# Patient Record
Sex: MALE | Race: WHITE | NOT HISPANIC OR LATINO | Employment: FULL TIME | URBAN - METROPOLITAN AREA
[De-identification: names, ages, dates, MRNs, and addresses within clinical notes are randomized per-mention and may not be internally consistent; named-entity substitution may affect disease eponyms.]

---

## 2021-10-19 ENCOUNTER — APPOINTMENT (OUTPATIENT)
Dept: CT IMAGING | Facility: CLINIC | Age: 36
DRG: 439 | End: 2021-10-19
Attending: EMERGENCY MEDICINE
Payer: COMMERCIAL

## 2021-10-19 ENCOUNTER — HOSPITAL ENCOUNTER (INPATIENT)
Facility: CLINIC | Age: 36
LOS: 2 days | Discharge: HOME | DRG: 439 | End: 2021-10-21
Attending: EMERGENCY MEDICINE | Admitting: INTERNAL MEDICINE
Payer: COMMERCIAL

## 2021-10-19 DIAGNOSIS — K85.21 ALCOHOL-INDUCED ACUTE PANCREATITIS WITH UNINFECTED NECROSIS: ICD-10-CM

## 2021-10-19 DIAGNOSIS — F10.930 ALCOHOL WITHDRAWAL, UNCOMPLICATED (H): ICD-10-CM

## 2021-10-19 DIAGNOSIS — R91.8 PULMONARY NODULES: ICD-10-CM

## 2021-10-19 LAB
ALBUMIN SERPL-MCNC: 4.6 G/DL (ref 3.4–5)
ALP SERPL-CCNC: 75 U/L (ref 40–150)
ALT SERPL W P-5'-P-CCNC: 77 U/L (ref 0–70)
ANION GAP SERPL CALCULATED.3IONS-SCNC: 11 MMOL/L (ref 3–14)
AST SERPL W P-5'-P-CCNC: 79 U/L (ref 0–45)
BASOPHILS # BLD AUTO: 0 10E3/UL (ref 0–0.2)
BASOPHILS NFR BLD AUTO: 0 %
BILIRUB SERPL-MCNC: 0.8 MG/DL (ref 0.2–1.3)
BUN SERPL-MCNC: 12 MG/DL (ref 7–30)
CALCIUM SERPL-MCNC: 10 MG/DL (ref 8.5–10.1)
CHLORIDE BLD-SCNC: 96 MMOL/L (ref 94–109)
CO2 SERPL-SCNC: 25 MMOL/L (ref 20–32)
CREAT SERPL-MCNC: 0.84 MG/DL (ref 0.66–1.25)
CREAT SERPL-MCNC: 0.89 MG/DL (ref 0.66–1.25)
EOSINOPHIL # BLD AUTO: 0.1 10E3/UL (ref 0–0.7)
EOSINOPHIL NFR BLD AUTO: 1 %
ERYTHROCYTE [DISTWIDTH] IN BLOOD BY AUTOMATED COUNT: 11.9 % (ref 10–15)
ETHANOL SERPL-MCNC: <0.01 G/DL
GFR SERPL CREATININE-BSD FRML MDRD: >90 ML/MIN/1.73M2
GFR SERPL CREATININE-BSD FRML MDRD: >90 ML/MIN/1.73M2
GLUCOSE BLD-MCNC: 109 MG/DL (ref 70–99)
HCT VFR BLD AUTO: 48.7 % (ref 40–53)
HGB BLD-MCNC: 17.3 G/DL (ref 13.3–17.7)
HOLD SPECIMEN: NORMAL
HOLD SPECIMEN: NORMAL
IMM GRANULOCYTES # BLD: 0 10E3/UL
IMM GRANULOCYTES NFR BLD: 0 %
LIPASE SERPL-CCNC: ABNORMAL U/L (ref 73–393)
LYMPHOCYTES # BLD AUTO: 1 10E3/UL (ref 0.8–5.3)
LYMPHOCYTES NFR BLD AUTO: 9 %
MAGNESIUM SERPL-MCNC: 1.8 MG/DL (ref 1.6–2.3)
MCH RBC QN AUTO: 35.9 PG (ref 26.5–33)
MCHC RBC AUTO-ENTMCNC: 35.5 G/DL (ref 31.5–36.5)
MCV RBC AUTO: 101 FL (ref 78–100)
MONOCYTES # BLD AUTO: 0.9 10E3/UL (ref 0–1.3)
MONOCYTES NFR BLD AUTO: 8 %
NEUTROPHILS # BLD AUTO: 9.1 10E3/UL (ref 1.6–8.3)
NEUTROPHILS NFR BLD AUTO: 82 %
NRBC # BLD AUTO: 0 10E3/UL
NRBC BLD AUTO-RTO: 0 /100
PHOSPHATE SERPL-MCNC: 2.6 MG/DL (ref 2.5–4.5)
PLATELET # BLD AUTO: 279 10E3/UL (ref 150–450)
POTASSIUM BLD-SCNC: 3.8 MMOL/L (ref 3.4–5.3)
PROT SERPL-MCNC: 9.2 G/DL (ref 6.8–8.8)
RBC # BLD AUTO: 4.82 10E6/UL (ref 4.4–5.9)
SARS-COV-2 RNA RESP QL NAA+PROBE: NEGATIVE
SODIUM SERPL-SCNC: 132 MMOL/L (ref 133–144)
TROPONIN I SERPL-MCNC: <0.015 UG/L (ref 0–0.04)
WBC # BLD AUTO: 11.1 10E3/UL (ref 4–11)

## 2021-10-19 PROCEDURE — 83735 ASSAY OF MAGNESIUM: CPT | Performed by: EMERGENCY MEDICINE

## 2021-10-19 PROCEDURE — 99285 EMERGENCY DEPT VISIT HI MDM: CPT | Mod: 25

## 2021-10-19 PROCEDURE — 96361 HYDRATE IV INFUSION ADD-ON: CPT

## 2021-10-19 PROCEDURE — 250N000013 HC RX MED GY IP 250 OP 250 PS 637: Performed by: EMERGENCY MEDICINE

## 2021-10-19 PROCEDURE — 250N000011 HC RX IP 250 OP 636: Performed by: EMERGENCY MEDICINE

## 2021-10-19 PROCEDURE — 80053 COMPREHEN METABOLIC PANEL: CPT | Performed by: EMERGENCY MEDICINE

## 2021-10-19 PROCEDURE — 85025 COMPLETE CBC W/AUTO DIFF WBC: CPT | Performed by: EMERGENCY MEDICINE

## 2021-10-19 PROCEDURE — 96374 THER/PROPH/DIAG INJ IV PUSH: CPT | Mod: 59

## 2021-10-19 PROCEDURE — C9803 HOPD COVID-19 SPEC COLLECT: HCPCS

## 2021-10-19 PROCEDURE — 74177 CT ABD & PELVIS W/CONTRAST: CPT

## 2021-10-19 PROCEDURE — 258N000003 HC RX IP 258 OP 636: Performed by: INTERNAL MEDICINE

## 2021-10-19 PROCEDURE — 258N000003 HC RX IP 258 OP 636: Performed by: EMERGENCY MEDICINE

## 2021-10-19 PROCEDURE — 83690 ASSAY OF LIPASE: CPT | Performed by: EMERGENCY MEDICINE

## 2021-10-19 PROCEDURE — 84484 ASSAY OF TROPONIN QUANT: CPT | Performed by: EMERGENCY MEDICINE

## 2021-10-19 PROCEDURE — 96375 TX/PRO/DX INJ NEW DRUG ADDON: CPT

## 2021-10-19 PROCEDURE — 82077 ASSAY SPEC XCP UR&BREATH IA: CPT | Performed by: EMERGENCY MEDICINE

## 2021-10-19 PROCEDURE — 250N000011 HC RX IP 250 OP 636: Performed by: INTERNAL MEDICINE

## 2021-10-19 PROCEDURE — 82565 ASSAY OF CREATININE: CPT | Performed by: INTERNAL MEDICINE

## 2021-10-19 PROCEDURE — 250N000009 HC RX 250: Performed by: INTERNAL MEDICINE

## 2021-10-19 PROCEDURE — 99223 1ST HOSP IP/OBS HIGH 75: CPT | Mod: AI | Performed by: INTERNAL MEDICINE

## 2021-10-19 PROCEDURE — 250N000013 HC RX MED GY IP 250 OP 250 PS 637: Performed by: INTERNAL MEDICINE

## 2021-10-19 PROCEDURE — 93005 ELECTROCARDIOGRAM TRACING: CPT

## 2021-10-19 PROCEDURE — 120N000001 HC R&B MED SURG/OB

## 2021-10-19 PROCEDURE — 250N000011 HC RX IP 250 OP 636

## 2021-10-19 PROCEDURE — 87635 SARS-COV-2 COVID-19 AMP PRB: CPT | Performed by: EMERGENCY MEDICINE

## 2021-10-19 PROCEDURE — 250N000009 HC RX 250: Performed by: EMERGENCY MEDICINE

## 2021-10-19 PROCEDURE — 36415 COLL VENOUS BLD VENIPUNCTURE: CPT | Performed by: EMERGENCY MEDICINE

## 2021-10-19 PROCEDURE — 84100 ASSAY OF PHOSPHORUS: CPT | Performed by: EMERGENCY MEDICINE

## 2021-10-19 RX ORDER — GABAPENTIN 300 MG/1
600 CAPSULE ORAL EVERY 8 HOURS
Status: DISCONTINUED | OUTPATIENT
Start: 2021-10-22 | End: 2021-10-19

## 2021-10-19 RX ORDER — CLONIDINE HYDROCHLORIDE 0.1 MG/1
0.1 TABLET ORAL EVERY 8 HOURS
Status: DISCONTINUED | OUTPATIENT
Start: 2021-10-19 | End: 2021-10-19

## 2021-10-19 RX ORDER — GABAPENTIN 300 MG/1
300 CAPSULE ORAL EVERY 8 HOURS
Status: DISCONTINUED | OUTPATIENT
Start: 2021-10-24 | End: 2021-10-21 | Stop reason: HOSPADM

## 2021-10-19 RX ORDER — OLANZAPINE 5 MG/1
5-10 TABLET, ORALLY DISINTEGRATING ORAL EVERY 6 HOURS PRN
Status: DISCONTINUED | OUTPATIENT
Start: 2021-10-19 | End: 2021-10-19

## 2021-10-19 RX ORDER — GABAPENTIN 300 MG/1
900 CAPSULE ORAL EVERY 8 HOURS
Status: DISCONTINUED | OUTPATIENT
Start: 2021-10-19 | End: 2021-10-19

## 2021-10-19 RX ORDER — MULTIPLE VITAMINS W/ MINERALS TAB 9MG-400MCG
1 TAB ORAL DAILY
Status: DISCONTINUED | OUTPATIENT
Start: 2021-10-19 | End: 2021-10-21 | Stop reason: HOSPADM

## 2021-10-19 RX ORDER — HALOPERIDOL 5 MG/ML
2.5-5 INJECTION INTRAMUSCULAR EVERY 6 HOURS PRN
Status: DISCONTINUED | OUTPATIENT
Start: 2021-10-19 | End: 2021-10-21 | Stop reason: HOSPADM

## 2021-10-19 RX ORDER — LORAZEPAM 1 MG/1
1-2 TABLET ORAL EVERY 30 MIN PRN
Status: DISCONTINUED | OUTPATIENT
Start: 2021-10-19 | End: 2021-10-19

## 2021-10-19 RX ORDER — CLONIDINE HYDROCHLORIDE 0.1 MG/1
0.1 TABLET ORAL EVERY 8 HOURS
Status: DISCONTINUED | OUTPATIENT
Start: 2021-10-19 | End: 2021-10-21 | Stop reason: HOSPADM

## 2021-10-19 RX ORDER — GABAPENTIN 300 MG/1
600 CAPSULE ORAL EVERY 8 HOURS
Status: DISCONTINUED | OUTPATIENT
Start: 2021-10-22 | End: 2021-10-21 | Stop reason: HOSPADM

## 2021-10-19 RX ORDER — ONDANSETRON 2 MG/ML
4 INJECTION INTRAMUSCULAR; INTRAVENOUS ONCE
Status: COMPLETED | OUTPATIENT
Start: 2021-10-19 | End: 2021-10-19

## 2021-10-19 RX ORDER — HALOPERIDOL 5 MG/ML
2.5-5 INJECTION INTRAMUSCULAR EVERY 6 HOURS PRN
Status: DISCONTINUED | OUTPATIENT
Start: 2021-10-19 | End: 2021-10-19

## 2021-10-19 RX ORDER — LORAZEPAM 2 MG/ML
1-2 INJECTION INTRAMUSCULAR EVERY 30 MIN PRN
Status: DISCONTINUED | OUTPATIENT
Start: 2021-10-19 | End: 2021-10-19

## 2021-10-19 RX ORDER — GABAPENTIN 100 MG/1
100 CAPSULE ORAL EVERY 8 HOURS
Status: DISCONTINUED | OUTPATIENT
Start: 2021-10-26 | End: 2021-10-21 | Stop reason: HOSPADM

## 2021-10-19 RX ORDER — SENNOSIDES 8.6 MG
8.6 TABLET ORAL 2 TIMES DAILY PRN
Status: DISCONTINUED | OUTPATIENT
Start: 2021-10-19 | End: 2021-10-21 | Stop reason: HOSPADM

## 2021-10-19 RX ORDER — ONDANSETRON 2 MG/ML
4 INJECTION INTRAMUSCULAR; INTRAVENOUS EVERY 6 HOURS PRN
Status: DISCONTINUED | OUTPATIENT
Start: 2021-10-19 | End: 2021-10-21 | Stop reason: HOSPADM

## 2021-10-19 RX ORDER — GABAPENTIN 600 MG/1
1200 TABLET ORAL ONCE
Status: COMPLETED | OUTPATIENT
Start: 2021-10-19 | End: 2021-10-19

## 2021-10-19 RX ORDER — LIDOCAINE 40 MG/G
CREAM TOPICAL
Status: DISCONTINUED | OUTPATIENT
Start: 2021-10-19 | End: 2021-10-21 | Stop reason: HOSPADM

## 2021-10-19 RX ORDER — FOLIC ACID 1 MG/1
1 TABLET ORAL DAILY
Status: DISCONTINUED | OUTPATIENT
Start: 2021-10-19 | End: 2021-10-21 | Stop reason: HOSPADM

## 2021-10-19 RX ORDER — LORAZEPAM 2 MG/ML
1-2 INJECTION INTRAMUSCULAR EVERY 30 MIN PRN
Status: DISCONTINUED | OUTPATIENT
Start: 2021-10-19 | End: 2021-10-21 | Stop reason: HOSPADM

## 2021-10-19 RX ORDER — KETOROLAC TROMETHAMINE 15 MG/ML
15 INJECTION, SOLUTION INTRAMUSCULAR; INTRAVENOUS ONCE
Status: COMPLETED | OUTPATIENT
Start: 2021-10-19 | End: 2021-10-19

## 2021-10-19 RX ORDER — IOPAMIDOL 755 MG/ML
100 INJECTION, SOLUTION INTRAVASCULAR ONCE
Status: COMPLETED | OUTPATIENT
Start: 2021-10-19 | End: 2021-10-19

## 2021-10-19 RX ORDER — HYDROMORPHONE HYDROCHLORIDE 1 MG/ML
0.5 INJECTION, SOLUTION INTRAMUSCULAR; INTRAVENOUS; SUBCUTANEOUS
Status: DISCONTINUED | OUTPATIENT
Start: 2021-10-19 | End: 2021-10-19

## 2021-10-19 RX ORDER — CETIRIZINE HYDROCHLORIDE 10 MG/1
10 TABLET ORAL DAILY
COMMUNITY

## 2021-10-19 RX ORDER — SERTRALINE HYDROCHLORIDE 100 MG/1
100 TABLET, FILM COATED ORAL DAILY
COMMUNITY

## 2021-10-19 RX ORDER — TRAZODONE HYDROCHLORIDE 100 MG/1
100 TABLET ORAL
COMMUNITY

## 2021-10-19 RX ORDER — GABAPENTIN 100 MG/1
100 CAPSULE ORAL EVERY 8 HOURS
Status: DISCONTINUED | OUTPATIENT
Start: 2021-10-26 | End: 2021-10-19

## 2021-10-19 RX ORDER — FLUMAZENIL 0.1 MG/ML
0.2 INJECTION, SOLUTION INTRAVENOUS
Status: DISCONTINUED | OUTPATIENT
Start: 2021-10-19 | End: 2021-10-19

## 2021-10-19 RX ORDER — NALOXONE HYDROCHLORIDE 0.4 MG/ML
0.2 INJECTION, SOLUTION INTRAMUSCULAR; INTRAVENOUS; SUBCUTANEOUS
Status: DISCONTINUED | OUTPATIENT
Start: 2021-10-19 | End: 2021-10-21 | Stop reason: HOSPADM

## 2021-10-19 RX ORDER — POLYETHYLENE GLYCOL 3350 17 G/17G
17 POWDER, FOR SOLUTION ORAL 2 TIMES DAILY PRN
Status: DISCONTINUED | OUTPATIENT
Start: 2021-10-19 | End: 2021-10-21 | Stop reason: HOSPADM

## 2021-10-19 RX ORDER — PANTOPRAZOLE SODIUM 40 MG/1
40 TABLET, DELAYED RELEASE ORAL
Status: DISCONTINUED | OUTPATIENT
Start: 2021-10-19 | End: 2021-10-21 | Stop reason: HOSPADM

## 2021-10-19 RX ORDER — NALOXONE HYDROCHLORIDE 0.4 MG/ML
0.4 INJECTION, SOLUTION INTRAMUSCULAR; INTRAVENOUS; SUBCUTANEOUS
Status: DISCONTINUED | OUTPATIENT
Start: 2021-10-19 | End: 2021-10-21 | Stop reason: HOSPADM

## 2021-10-19 RX ORDER — SODIUM CHLORIDE 9 MG/ML
INJECTION, SOLUTION INTRAVENOUS CONTINUOUS
Status: DISCONTINUED | OUTPATIENT
Start: 2021-10-19 | End: 2021-10-21 | Stop reason: HOSPADM

## 2021-10-19 RX ORDER — GABAPENTIN 300 MG/1
300 CAPSULE ORAL EVERY 8 HOURS
Status: DISCONTINUED | OUTPATIENT
Start: 2021-10-24 | End: 2021-10-19

## 2021-10-19 RX ORDER — GABAPENTIN 300 MG/1
900 CAPSULE ORAL EVERY 8 HOURS
Status: DISCONTINUED | OUTPATIENT
Start: 2021-10-19 | End: 2021-10-21 | Stop reason: HOSPADM

## 2021-10-19 RX ORDER — HYDROMORPHONE HYDROCHLORIDE 1 MG/ML
.3-.5 INJECTION, SOLUTION INTRAMUSCULAR; INTRAVENOUS; SUBCUTANEOUS
Status: DISCONTINUED | OUTPATIENT
Start: 2021-10-19 | End: 2021-10-21 | Stop reason: HOSPADM

## 2021-10-19 RX ORDER — LANOLIN ALCOHOL/MO/W.PET/CERES
100 CREAM (GRAM) TOPICAL DAILY
Status: DISCONTINUED | OUTPATIENT
Start: 2021-10-19 | End: 2021-10-21 | Stop reason: HOSPADM

## 2021-10-19 RX ORDER — OLANZAPINE 5 MG/1
5-10 TABLET, ORALLY DISINTEGRATING ORAL EVERY 6 HOURS PRN
Status: DISCONTINUED | OUTPATIENT
Start: 2021-10-19 | End: 2021-10-21 | Stop reason: HOSPADM

## 2021-10-19 RX ORDER — LORAZEPAM 1 MG/1
1-2 TABLET ORAL EVERY 30 MIN PRN
Status: DISCONTINUED | OUTPATIENT
Start: 2021-10-19 | End: 2021-10-21 | Stop reason: HOSPADM

## 2021-10-19 RX ORDER — MORPHINE SULFATE 4 MG/ML
4 INJECTION, SOLUTION INTRAMUSCULAR; INTRAVENOUS ONCE
Status: COMPLETED | OUTPATIENT
Start: 2021-10-19 | End: 2021-10-19

## 2021-10-19 RX ORDER — LORAZEPAM 2 MG/ML
1 INJECTION INTRAMUSCULAR ONCE
Status: COMPLETED | OUTPATIENT
Start: 2021-10-19 | End: 2021-10-19

## 2021-10-19 RX ORDER — ONDANSETRON 4 MG/1
4 TABLET, ORALLY DISINTEGRATING ORAL EVERY 6 HOURS PRN
Status: DISCONTINUED | OUTPATIENT
Start: 2021-10-19 | End: 2021-10-21 | Stop reason: HOSPADM

## 2021-10-19 RX ORDER — HYDROMORPHONE HYDROCHLORIDE 1 MG/ML
0.3 INJECTION, SOLUTION INTRAMUSCULAR; INTRAVENOUS; SUBCUTANEOUS
Status: DISCONTINUED | OUTPATIENT
Start: 2021-10-19 | End: 2021-10-19

## 2021-10-19 RX ORDER — FLUMAZENIL 0.1 MG/ML
0.2 INJECTION, SOLUTION INTRAVENOUS
Status: DISCONTINUED | OUTPATIENT
Start: 2021-10-19 | End: 2021-10-21 | Stop reason: HOSPADM

## 2021-10-19 RX ORDER — GABAPENTIN 600 MG/1
1200 TABLET ORAL ONCE
Status: DISCONTINUED | OUTPATIENT
Start: 2021-10-19 | End: 2021-10-19

## 2021-10-19 RX ADMIN — ONDANSETRON 4 MG: 2 INJECTION INTRAMUSCULAR; INTRAVENOUS at 13:24

## 2021-10-19 RX ADMIN — HYDROMORPHONE HYDROCHLORIDE 0.5 MG: 1 INJECTION, SOLUTION INTRAMUSCULAR; INTRAVENOUS; SUBCUTANEOUS at 23:14

## 2021-10-19 RX ADMIN — SODIUM CHLORIDE 65 ML: 9 INJECTION, SOLUTION INTRAVENOUS at 13:35

## 2021-10-19 RX ADMIN — ENOXAPARIN SODIUM 40 MG: 40 INJECTION SUBCUTANEOUS at 18:39

## 2021-10-19 RX ADMIN — LORAZEPAM 1 MG: 2 INJECTION INTRAMUSCULAR; INTRAVENOUS at 14:30

## 2021-10-19 RX ADMIN — PANTOPRAZOLE SODIUM 40 MG: 40 TABLET, DELAYED RELEASE ORAL at 18:34

## 2021-10-19 RX ADMIN — LORAZEPAM 1 MG: 2 INJECTION INTRAMUSCULAR; INTRAVENOUS at 19:02

## 2021-10-19 RX ADMIN — MORPHINE SULFATE 4 MG: 4 INJECTION, SOLUTION INTRAMUSCULAR; INTRAVENOUS at 13:24

## 2021-10-19 RX ADMIN — CLONIDINE HYDROCHLORIDE 0.1 MG: 0.1 TABLET ORAL at 21:15

## 2021-10-19 RX ADMIN — HYDROMORPHONE HYDROCHLORIDE 0.3 MG: 1 INJECTION, SOLUTION INTRAMUSCULAR; INTRAVENOUS; SUBCUTANEOUS at 18:21

## 2021-10-19 RX ADMIN — GABAPENTIN 1200 MG: 600 TABLET, FILM COATED ORAL at 14:30

## 2021-10-19 RX ADMIN — CLONIDINE HYDROCHLORIDE 0.1 MG: 0.1 TABLET ORAL at 14:30

## 2021-10-19 RX ADMIN — IOPAMIDOL 100 ML: 755 INJECTION, SOLUTION INTRAVENOUS at 13:36

## 2021-10-19 RX ADMIN — THIAMINE HCL TAB 100 MG 100 MG: 100 TAB at 15:58

## 2021-10-19 RX ADMIN — SODIUM CHLORIDE 1000 ML: 9 INJECTION, SOLUTION INTRAVENOUS at 15:56

## 2021-10-19 RX ADMIN — HYDROMORPHONE HYDROCHLORIDE 0.5 MG: 1 INJECTION, SOLUTION INTRAMUSCULAR; INTRAVENOUS; SUBCUTANEOUS at 15:57

## 2021-10-19 RX ADMIN — POLYETHYLENE GLYCOL 3350 17 G: 17 POWDER, FOR SOLUTION ORAL at 21:15

## 2021-10-19 RX ADMIN — KETOROLAC TROMETHAMINE 15 MG: 15 INJECTION, SOLUTION INTRAMUSCULAR; INTRAVENOUS at 13:23

## 2021-10-19 RX ADMIN — FOLIC ACID 1 MG: 1 TABLET ORAL at 15:58

## 2021-10-19 RX ADMIN — FOLIC ACID: 5 INJECTION, SOLUTION INTRAMUSCULAR; INTRAVENOUS; SUBCUTANEOUS at 18:29

## 2021-10-19 RX ADMIN — MULTIPLE VITAMINS W/ MINERALS TAB 1 TABLET: TAB at 15:58

## 2021-10-19 RX ADMIN — HYDROMORPHONE HYDROCHLORIDE 0.5 MG: 1 INJECTION, SOLUTION INTRAMUSCULAR; INTRAVENOUS; SUBCUTANEOUS at 20:41

## 2021-10-19 RX ADMIN — SODIUM CHLORIDE 1000 ML: 9 INJECTION, SOLUTION INTRAVENOUS at 13:23

## 2021-10-19 RX ADMIN — ONDANSETRON 4 MG: 2 INJECTION INTRAMUSCULAR; INTRAVENOUS at 18:24

## 2021-10-19 RX ADMIN — GABAPENTIN 900 MG: 300 CAPSULE ORAL at 21:15

## 2021-10-19 ASSESSMENT — ENCOUNTER SYMPTOMS
CHILLS: 1
BLOOD IN STOOL: 0
NAUSEA: 1
FEVER: 0
ABDOMINAL PAIN: 1
VOMITING: 0
ABDOMINAL DISTENTION: 1

## 2021-10-19 ASSESSMENT — ACTIVITIES OF DAILY LIVING (ADL)
ADLS_ACUITY_SCORE: 12
ADLS_ACUITY_SCORE: 12

## 2021-10-19 ASSESSMENT — MIFFLIN-ST. JEOR: SCORE: 1934.63

## 2021-10-19 NOTE — ED NOTES
[facility]  ED Nurse Handoff Report    Tello Jones is a 35 year old male   ED Chief complaint: Abdominal Pain  . ED Diagnosis:   Final diagnoses:   Alcohol-induced acute pancreatitis with uninfected necrosis   Alcohol withdrawal, uncomplicated (H)     Allergies: Not on File    Code Status: Full Code  Activity level - Baseline/Home:  Independent. Activity Level - Current:   Stand by Assist. Lift room needed: No. Bariatric: No   Needed: No   Isolation: No. Infection: Not Applicable.     Vital Signs:   Vitals:    10/19/21 1415 10/19/21 1500 10/19/21 1515 10/19/21 1530   BP: (!) 167/114 (!) 172/125 (!) 170/115 (!) 175/118   Pulse: 100 105 101 109   Resp: 22 28 24 16   Temp:       TempSrc:       SpO2: 100% 99% 99% 99%   Weight:           Cardiac Rhythm:  ,   Cardiac  Cardiac Rhythm: Sinus tachycardia;Normal sinus rhythm  Pain level:    Patient confused: No. Patient Falls Risk: Yes.   Elimination Status: Has voided   Patient Report - Initial Complaint: abd pain. Focused Assessment: pt c/o RUQ pain that diffuses across abd. Pt bloated denies constipation.  Tests Performed: see results. Abnormal Results: see results.  Labs Ordered and Resulted from Time of ED Arrival Up to the Time of Departure from the ED   COMPREHENSIVE METABOLIC PANEL - Abnormal; Notable for the following components:       Result Value    Sodium 132 (*)     Glucose 109 (*)     AST 79 (*)     ALT 77 (*)     Protein Total 9.2 (*)     All other components within normal limits   CBC WITH PLATELETS AND DIFFERENTIAL - Abnormal; Notable for the following components:    WBC Count 11.1 (*)      (*)     MCH 35.9 (*)     Absolute Neutrophils 9.1 (*)     All other components within normal limits   LIPASE - Abnormal; Notable for the following components:    Lipase 10,927 (*)     All other components within normal limits   TROPONIN I - Normal   ETHYL ALCOHOL LEVEL - Normal   MAGNESIUM - Normal   PHOSPHORUS - Normal   COVID-19 VIRUS  (CORONAVIRUS) BY PCR - Normal    Narrative:     Testing was performed using the abdoulaye  SARS-CoV-2 & Influenza A/B Assay on the abdoulaye  Olga  System.  This test should be ordered for the detection of SARS-COV-2 in individuals who meet SARS-CoV-2 clinical and/or epidemiological criteria. Test performance is unknown in asymptomatic patients.  This test is for in vitro diagnostic use under the FDA EUA for laboratories certified under CLIA to perform moderate and/or high complexity testing. This test has not been FDA cleared or approved.  A negative test does not rule out the presence of PCR inhibitors in the specimen or target RNA in concentration below the limit of detection for the assay. The possibility of a false negative should be considered if the patient's recent exposure or clinical presentation suggests COVID-19.  [facility] are certified under the Clinical Laboratory Improvement Amendments of 1988 (CLIA-88) as qualified to perform moderate and/or high complexity laboratory testing.   EXTRA BLUE TOP TUBE   EXTRA RED TOP TUBE   PERIPHERAL IV CATHETER   CARDIAC CONTINUOUS MONITORING   PULSE OXIMETRY NURSING   CIWA-AR SCALE AND VS   NOTIFY PROVIDER   CBC WITH PLATELETS & DIFFERENTIAL    Narrative:     The following orders were created for panel order CBC + differential.  Procedure                               Abnormality         Status                     ---------                               -----------         ------                     CBC with platelets and d...[[id number]]  Abnormal            Final result                 Please view results for these tests on the individual orders.   EXTRA TUBE    Narrative:     The following orders were created for panel order [place] Draw.  Procedure                               Abnormality         Status                     ---------                               -----------         ------                     Extra Blue Top Tube[[id number]]                               Final result               Extra Red Top Tube[[id number]]                               Final result                 Please view results for these tests on the individual orders.     CT Abdomen Pelvis w Contrast   Final Result   IMPRESSION:    1.  Pancreatitis identified with small to moderate adjacent fluid   suggesting acute necrotic collections.   2.  Fatty liver.   3.  No other acute abnormality.   4.  Some nonspecific heterogeneity of the prostate and seminal   vesicles showing areas of small nodularity. If there is clinical   concern, urologic assessment may be needed.   5.  Technically nonspecific peripherally sclerotic lesion at the left   innominate bone.   6.  Tiny pulmonary nodule on the right, see below for follow up as   needed.      Recommendations for one or multiple incidental lung nodules < 6mm :     Low risk patients: No routine follow-up.     High risk patients: Optional follow-up CT at 12 months; if   unchanged, no further follow-up.      *Low Risk: Minimal or absent history of smoking or other known risk   factors.   *Nonsolid (ground glass) or partly solid nodules may require longer   follow-up to exclude indolent adenocarcinoma.   *Recommendations based on Guidelines for the Management of Incidental   Pulmonary Nodules Detected at CT: From the Fleischner Society 2017,   Radiology 2017.      CRISTÓBAL BUTT MD            SYSTEM ID:  [id number]         Treatments provided: see results  Family Comments: N/A  OBS brochure/video discussed/provided to patient:  N/A  ED Medications:   Medications   HYDROmorphone (PF) (DILAUDID) injection 0.5 mg (0.5 mg Intravenous Given 10/19/21 1557)   0.9% sodium chloride BOLUS (1,000 mLs Intravenous New Bag 10/19/21 1556)   cloNIDine (CATAPRES) tablet 0.1 mg (0.1 mg Oral Given 10/19/21 1430)   OLANZapine zydis (zyPREXA) ODT tab 5-10 mg (has no administration in time range)     Or   haloperidol lactate (HALDOL) injection 2.5-5 mg (has no administration in  time range)   flumazenil (ROMAZICON) injection 0.2 mg (has no administration in time range)   melatonin tablet 5 mg (has no administration in time range)   gabapentin (NEURONTIN) capsule 900 mg (has no administration in time range)   gabapentin (NEURONTIN) capsule 600 mg (has no administration in time range)   gabapentin (NEURONTIN) capsule 300 mg (has no administration in time range)   gabapentin (NEURONTIN) capsule 100 mg (has no administration in time range)   LORazepam (ATIVAN) tablet 1-2 mg (has no administration in time range)     Or   LORazepam (ATIVAN) injection 1-2 mg (has no administration in time range)   thiamine (B-1) tablet 100 mg (100 mg Oral Given 10/19/21 1558)   folic acid (FOLVITE) tablet 1 mg (1 mg Oral Given 10/19/21 1558)   multivitamin w/minerals (THERA-VIT-M) tablet 1 tablet (1 tablet Oral Given 10/19/21 1558)   0.9% sodium chloride BOLUS (0 mLs Intravenous Stopped 10/19/21 1430)   ondansetron (ZOFRAN) injection 4 mg (4 mg Intravenous Given 10/19/21 1324)   morphine (PF) injection 4 mg (4 mg Intravenous Given 10/19/21 1324)   ketorolac (TORADOL) injection 15 mg (15 mg Intravenous Given 10/19/21 1323)   iopamidol (ISOVUE-370) solution 100 mL (100 mLs Intravenous Given 10/19/21 1336)   sodium chloride 0.9 % bag 500mL for CT scan flush use (65 mLs Intravenous Given 10/19/21 1335)   LORazepam (ATIVAN) injection 1 mg (1 mg Intravenous Given 10/19/21 1430)   gabapentin (NEURONTIN) tablet 1,200 mg (1,200 mg Oral Given 10/19/21 1430)     Drips infusing:  No  For the majority of the shift, the patient's behavior Green. Interventions performed were N/A.    Sepsis treatment initiated: No     Patient tested for COVID 19 prior to admission: YES    ED Nurse Name/Phone Number: Darnell Parra RN,   4:10 PM    RECEIVING UNIT ED HANDOFF REVIEW    Above ED Nurse Handoff Report was reviewed: Yes  Reviewed by: Marta Plaza RN on October 19, 2021 at 5:02 PM

## 2021-10-19 NOTE — ED NOTES
Pt denies any alcohol or drugs per assessment. Pt abd bloat, soft. Normal bowel movements. Pt has N/V upon arrival. Some CP.

## 2021-10-19 NOTE — PHARMACY-ADMISSION MEDICATION HISTORY
Admission medication history interview status for this patient is complete. See [facility] admission navigator for allergy information, prior to admission medications and immunization status.     Medication history interview done, indicate source(s): Patient  Medication history resources (including written lists, pill bottles, clinic record):None  Pharmacy: -    Changes made to PTA medication list:  Added: all listed  Changed: -  Reported as Not Taking: -  Removed: -    Actions taken by pharmacist (provider contacted, etc):None     Additional medication history information:None    Medication reconciliation/reorder completed by provider prior to medication history?  no   (Y/N)     For patients on insulin therapy:   Do you use sliding scale insulin based on blood sugars?   What is your pre-meal insulin coverage?    Do you typically eat three meals a day?   How many times do you check your blood glucose per day?   How many episodes of hypoglycemia do you typically have per month?   Do you have a Continuous Glucose Monitor (CGM)?      Prior to Admission medications    Medication Sig Last Dose Taking? Auth Provider   amitriptyline (ELAVIL) 25 MG tablet Take 25 mg by mouth nightly as needed for sleep  Yes Unknown, Entered By History   cetirizine (ZYRTEC) 10 MG tablet Take 10 mg by mouth daily 10/19/2021 at Unknown time Yes Unknown, Entered By History   omeprazole (PRILOSEC) 20 MG DR capsule Take 20 mg by mouth daily 10/19/2021 at Unknown time Yes Unknown, Entered By History   sertraline (ZOLOFT) 100 MG tablet Take 100 mg by mouth daily 10/19/2021 at Unknown time Yes Unknown, Entered By History   traZODone (DESYREL) 100 MG tablet Take 100 mg by mouth nightly as needed for sleep  Yes Unknown, Entered By History

## 2021-10-19 NOTE — H&P
[facility]    History and Physical  Hospitalist       Date of Admission:  10/19/2021  Date of Service (when I saw the patient): 10/19/21    Assessment & Plan   Tello Jones is a 35 year old male patient with past medical history of anxiety, depression, ADHD, GERD came to emergency room for evaluation for abdominal pain.Patient stated that he has been drinking 5-6 beers over the weekend. His last drink was on Sunday. He stated that he started to have abdominal pain last night around 2 :00Am. He stated that his pain is localized in the upper mid abdomen with some radiation to his back. He stated that he has been drinking heavily over the weekend. He denies prior history of pancreatitis. Patient was tremulous.  Lab workup elevated lipase at 10,927. CT of the abdomen/pelvis was done and showed pancreatitis identified with small to moderate adjacent fluid suggesting acute necrotic collections. He was given 2 liters of normal saline in the ER. He was also started on alcohol withdrawal protocol. He was admitted to the hospital for further management.     1. Acute alcoholic pancreatitis with acute necrotic collections  He was given 2 liters of normal saline in emergency room. Will continue aggressive IV fluid resuscitation. Will keep NPO. Will put on IV dilaudid as needed for pain. Will consult gastroenterology for evaluation given CT findings suggesting acute necrotic collections.     2. Possible impending alcohol withdrawal  Patient is feeling tremulous and likely starting to withdraw. Tachycardic. Started on ativan in the ER. Will put him on alcohol withdrawal protocol.     3. Alcohol abuse:   Denies prior history of treatment for alcohol dependence. Will need CD counseling when more stable.     Will admit to medical floor as inpatient.     DVT Prophylaxis: Enoxaparin (Lovenox) SQ  Code Status: Full Code    Disposition: Expected discharge: at least 2 nights of hospital course until pancreatitis  darryn Brock MD    Primary Care Physician   No primary care provider on file.    Chief Complaint   Abdominal pain    History is obtained from the patient    History of Present Illness   Tello Jones is a 35 year old male patient with past medical history of anxiety, depression, ADHD, GERD came to emergency room for evaluation for abdominal pain.Patient stated that he has been drinking 5-6 beers over the weekend. His last drink was on Sunday. He stated that he started to have abdominal pain last night around 2 :00Am. He stated that his pain is localized in the upper mid abdomen with some radiation to his back. He had some associated nausea but denies vomiting or diarrhea. He also denies urinary symptoms. He denies prior history of alcohol abuse or alcohol withdrawal. He also denies history of pancreatitis.  Because of worsening of his pain, he came to emergency room for evaluation. On arrival to emergency room, his vital signs were checked and showed temperature 98, pulse 116, blood pressure 162/115, oxygen saturation 100 % on room air.   Laboratory workup showed sodium 132, potassium 3.8, creatinine 0.84, ALT 77, AST 79, lipase 10,927, troponin <0.015. WBC 11.1, hemoglobin 17.3  CT of the abdomen/pelvis was done and showed pancreatitis identified with small to moderate adjacent fluid suggesting acute necrotic collections.   He was given IV fluid boluses and IV dilaudid in emergency room. He was admitted to the hospital for further evaluation and management.     Past Medical History    I have reviewed this patient's medical history and updated it with pertinent information if needed.   ADHD  GERD  Anxiety  Depression    Past Surgical History   I have reviewed this patient's surgical history and updated it with pertinent information if needed.  No past surgical history on file.    Prior to Admission Medications   None     Allergies   Not on File    Social History   I have reviewed this patient's  social history and updated it with pertinent information if needed. Tello Jones      Family History   I have reviewed this patient's family history and updated it with pertinent information if needed.   No family history on file.    Review of Systems   The 10 point Review of Systems is negative other than noted in the HPI or here. Abdominal pain    Physical Exam   Temp: 98  F (36.7  C) Temp src: Oral BP: (!) 167/114 Pulse: 100   Resp: 22 SpO2: 100 % O2 Device: None (Room air)    Vital Signs with Ranges  Temp:  [98  F (36.7  C)] 98  F (36.7  C)  Pulse:  [] 100  Resp:  [22-41] 22  BP: (162-175)/(114-117) 167/114  SpO2:  [100 %] 100 %  200 lbs 0 oz    GEN:  Alert, oriented x 3, appears comfortable, NAD.  HEENT:  Normocephalic/atraumatic, no scleral icterus, no nasal discharge, mouth moist.  CV:  Regular rate and rhythm, no murmur or JVD.  S1 + S2 noted, no S3 or S4.  LUNGS:  Clear to auscultation bilaterally without rales/rhonchi/wheezing/retractions.  Symmetric chest rise on inhalation noted.  ABD:  Active bowel sounds, soft, non-tender/non-distended.  No rebound/guarding/rigidity.  EXT:  No edema or cyanosis.  Hands/feet warm to touch with good signs of peripheral perfusion.  No joint synovitis noted.  SKIN:  Dry to touch, no exanthems noted in the visualized areas.  NEURO:  Symmetric muscle strength, sensation to touch grossly intact.  No new focal deficits appreciated.    Data   Data reviewed today:  I personally reviewed    Recent Labs   Lab 10/19/21  1306 10/19/21  1259   WBC 11.1*  --    HGB 17.3  --    *  --      --    NA  --  132*   POTASSIUM  --  3.8   CHLORIDE  --  96   CO2  --  25   BUN  --  12   CR  --  0.84   ANIONGAP  --  11   OVIDIO  --  10.0   GLC  --  109*   ALBUMIN  --  4.6   PROTTOTAL  --  9.2*   BILITOTAL  --  0.8   ALKPHOS  --  75   ALT  --  77*   AST  --  79*   LIPASE  --  10,927*   TROPONIN  --  <0.015       Recent Results (from the past 24 hour(s))   CT Abdomen Pelvis w  Contrast    Narrative    CT ABDOMEN/PELVIS WITH CONTRAST October 19, 2021 1:44 PM    CLINICAL HISTORY: Nausea/vomiting. Abdominal pain, acute,  nonlocalized.    TECHNIQUE: CT scan of the abdomen and pelvis was performed following  injection of IV contrast. Multiplanar reformats were obtained. Dose  reduction techniques were used.  CONTRAST: 100 mL Isovue-370.    COMPARISON: None.    FINDINGS:   LOWER CHEST: No acute abnormality. 0.2 cm right lower lobe nodule near  the major fissure series 4 image 1.    HEPATOBILIARY: Diffuse hepatic steatosis. No acute liver or  gallbladder abnormality. No calcified stones.    PANCREAS: There is severe pancreas edema with surrounding small to  moderate fluid. Irregular fluid lying inferior to the pancreas tail is  approximately 9.0 x 3.7 cm series 4 image 84. Some free fluid layers  into the right inferior abdomen as well along the pericolic gutter.  The pancreas parenchyma enhances homogeneously. No focal fluid within  the pancreas itself. No pancreatic duct dilatation or calcification.    SPLEEN: Normal.    ADRENAL GLANDS: Small left adrenal nodule is 0.8 cm image 55. Though  nonspecific these are commonly adenomas. Normal right adrenal.    KIDNEYS/BLADDER: No significant mass, stones, or hydronephrosis. There  are simple or benign cysts. No follow up is needed.    BOWEL: Normal appendix. No acute bowel abnormality. No obstruction.    PELVIC ORGANS: Heterogeneous prostate and seminal vesicles. There is  some nonspecific nodularity of the seminal vesicles.    ADDITIONAL FINDINGS: None.    MUSCULOSKELETAL: No acute abnormality. Small peripherally sclerotic  lucency at the left innominate series 4 image 180.      Impression    IMPRESSION:   1.  Pancreatitis identified with small to moderate adjacent fluid  suggesting acute necrotic collections.  2.  Fatty liver.  3.  No other acute abnormality.  4.  Some nonspecific heterogeneity of the prostate and seminal  vesicles showing areas  of small nodularity. If there is clinical  concern, urologic assessment may be needed.  5.  Technically nonspecific peripherally sclerotic lesion at the left  innominate bone.  6.  Tiny pulmonary nodule on the right, see below for follow up as  needed.    Recommendations for one or multiple incidental lung nodules < 6mm :    Low risk patients: No routine follow-up.    High risk patients: Optional follow-up CT at 12 months; if  unchanged, no further follow-up.    *Low Risk: Minimal or absent history of smoking or other known risk  factors.  *Nonsolid (ground glass) or partly solid nodules may require longer  follow-up to exclude indolent adenocarcinoma.  *Recommendations based on Guidelines for the Management of Incidental  Pulmonary Nodules Detected at CT: From the Fleischner Society 2017,  Radiology 2017.    CRISTÓBAL BUTT MD         SYSTEM ID:  [id number]

## 2021-10-19 NOTE — ED PROVIDER NOTES
History   Chief Complaint:  Abdominal Pain       The history is provided by the patient.      Tello Jones is a 35 year old male with history of anxiety, depression, ADHD, and GERD who presents with abdominal pain. He reports generalized abdominal pain that comes in waves, with the worst pain in his upper right quadrant, beginning around 0200 today. The patient notes that the pain has gotten progressively worse throughout the day and is accompanied by nausea. He reports that the pain is some of the worst of his life and unlike anything in the past. The patient also notes feeling more bloated and gassy lately, with some chills earlier today. He reports drinking alcohol over the weekend 2-3 days ago, noting 5-6 drinks including a cristi and some beers. He notes his last drink two nights ago. The patient denies emesis, hematemesis, melena, blood in the stool, fever, and history of withdrawal. Of note, the patient is feeling improved after interventions, but the pain has not fully resolved.       Review of Systems   Constitutional: Positive for chills. Negative for fever.   Gastrointestinal: Positive for abdominal distention, abdominal pain and nausea. Negative for blood in stool and vomiting.        (-) hematemesis, melena   All other systems reviewed and are negative.        Allergies:  Cefaclor    Medications:  Trazodone  Amitriptyline  Omeprazole  sertraline     Past Medical History:     Anxiety  Insomnia   Seasonal allergies   GERD (gastroesophageal reflux disease)  ADHD (attention deficit hyperactivity disorder)  Depression     Social History:  Presents alone.  PCP: No primary care provider on file.     Physical Exam     Patient Vitals for the past 24 hrs:   BP Temp Temp src Pulse Resp SpO2 Weight   10/19/21 1600 (!) 156/113 -- -- 103 21 99 % --   10/19/21 1545 (!) 168/126 -- -- 98 26 100 % --   10/19/21 1530 (!) 175/118 -- -- 109 16 99 % --   10/19/21 1515 (!) 170/115 -- -- 101 24 99 % --   10/19/21 1500  (!) 172/125 -- -- 105 28 99 % --   10/19/21 1415 (!) 167/114 -- -- 100 22 100 % --   10/19/21 1346 -- 98  F (36.7  C) Oral -- -- -- --   10/19/21 1345 (!) 175/117 -- -- 100 23 100 % --   10/19/21 1330 -- -- -- -- (!) 34 100 % --   10/19/21 1329 -- -- -- 98 (!) 41 100 % --   10/19/21 1257 -- -- -- -- -- -- 90.7 kg (200 lb)   10/19/21 1249 (!) 162/115 -- -- 116 26 100 % --       Physical Exam  General: Alert, no acute distress; mildly tremulous   HEENT:  Moist mucous membranes.  Conjunctiva normal.  Tongue fasiculations  CV:  Tachycardic, regular rhythm, no m/r/g, skin warm and well perfused  Pulm:  CTAB, no wheezes/ronchi/rales.  No acute distress, breathing comfortably  GI:  Soft, mild epigastric tenderness, nondistended.  No rebound or guarding.  Normal bowel sounds  MSK:  Moving all extremities.  No focal areas of edema, erythema, or tenderness  Skin:  WWP, no rashes, no lower extremity edema, skin color normal, no diaphoresis  Psych:  Well-appearing, normal affect, regular speech    Emergency Department Course   ECG  ECG obtained at 1308, ECG read at 1310  Sinus tachycardia. Otherwise normal ECG.    No prior ECG to compare.   Rate 105 bpm. WI interval 188 ms. QRS duration 82 ms. QT/QTc 348/459 ms. P-R-T axes 40 57 34.     Imaging:  CT Abdomen Pelvis w Contrast   Final Result   IMPRESSION:    1.  Pancreatitis identified with small to moderate adjacent fluid   suggesting acute necrotic collections.   2.  Fatty liver.   3.  No other acute abnormality.   4.  Some nonspecific heterogeneity of the prostate and seminal   vesicles showing areas of small nodularity. If there is clinical   concern, urologic assessment may be needed.   5.  Technically nonspecific peripherally sclerotic lesion at the left   innominate bone.   6.  Tiny pulmonary nodule on the right, see below for follow up as   needed.      Recommendations for one or multiple incidental lung nodules < 6mm :     Low risk patients: No routine follow-up.      High risk patients: Optional follow-up CT at 12 months; if   unchanged, no further follow-up.      *Low Risk: Minimal or absent history of smoking or other known risk   factors.   *Nonsolid (ground glass) or partly solid nodules may require longer   follow-up to exclude indolent adenocarcinoma.   *Recommendations based on Guidelines for the Management of Incidental   Pulmonary Nodules Detected at CT: From the Fleischner Society 2017,   Radiology 2017.      CRISTÓBAL BUTT MD            SYSTEM ID:  [id number]      Report per radiology    Laboratory:  Labs Ordered and Resulted from Time of ED Arrival Up to the Time of Departure from the ED   COMPREHENSIVE METABOLIC PANEL - Abnormal; Notable for the following components:       Result Value    Sodium 132 (*)     Glucose 109 (*)     AST 79 (*)     ALT 77 (*)     Protein Total 9.2 (*)     All other components within normal limits   CBC WITH PLATELETS AND DIFFERENTIAL - Abnormal; Notable for the following components:    WBC Count 11.1 (*)      (*)     MCH 35.9 (*)     Absolute Neutrophils 9.1 (*)     All other components within normal limits   LIPASE - Abnormal; Notable for the following components:    Lipase 10,927 (*)     All other components within normal limits   TROPONIN I - Normal   ETHYL ALCOHOL LEVEL - Normal   MAGNESIUM - Normal   PHOSPHORUS - Normal   COVID-19 VIRUS (CORONAVIRUS) BY PCR - Normal    Narrative:     Testing was performed using the abdoulaye  SARS-CoV-2 & Influenza A/B Assay on the abdoulaye  Olga  System.  This test should be ordered for the detection of SARS-COV-2 in individuals who meet SARS-CoV-2 clinical and/or epidemiological criteria. Test performance is unknown in asymptomatic patients.  This test is for in vitro diagnostic use under the FDA EUA for laboratories certified under CLIA to perform moderate and/or high complexity testing. This test has not been FDA cleared or approved.  A negative test does not rule out the presence of PCR inhibitors in the  specimen or target RNA in concentration below the limit of detection for the assay. The possibility of a false negative should be considered if the patient's recent exposure or clinical presentation suggests COVID-19.  [facility] are certified under the Clinical Laboratory Improvement Amendments of 1988 (CLIA-88) as qualified to perform moderate and/or high complexity laboratory testing.   EXTRA BLUE TOP TUBE   EXTRA RED TOP TUBE   PERIPHERAL IV CATHETER   CARDIAC CONTINUOUS MONITORING   PULSE OXIMETRY NURSING   CIWA-AR SCALE AND VS   NOTIFY PROVIDER   CBC WITH PLATELETS & DIFFERENTIAL    Narrative:     The following orders were created for panel order CBC + differential.  Procedure                               Abnormality         Status                     ---------                               -----------         ------                     CBC with platelets and d...[[id number]]  Abnormal            Final result                 Please view results for these tests on the individual orders.   EXTRA TUBE    Narrative:     The following orders were created for panel order [place] Draw.  Procedure                               Abnormality         Status                     ---------                               -----------         ------                     Extra Blue Top Tube[[id number]]                              Final result               Extra Red Top Tube[[id number]]                               Final result                 Please view results for these tests on the individual orders.        Procedures  None    Emergency Department Course:    Reviewed:  I reviewed nursing notes, vitals, past medical history and Care Everywhere    Assessments:  1406 I obtained history and examined the patient as noted above. I also explained findings at this time and we discussed admission to the hospital.    1419 I rechecked and updated the patient.       Consults:  1434 I consulted with Dr. Brock  hospitalist, regarding the patient's history and presentation here in the emergency department who accepted the patient for admission.     Interventions:  1323 Ketorolac, 15 mg, IV  1323 NS, 1000 mL, IV  1324 Ondansetron, 4 mg, IV  1324 Morphine, 4 mg, IV   1430 Ativan, 1 mg, IV  1430 Neurontin, 1200 mg, Oral  1430 Catapres, 0.1 mg, Oral      Disposition:  The patient was admitted to the hospital under the care of Dr. Brock.     Impression & Plan     Medical Decision Making:  Tello Jones is a 35 year old male with history of GERD who presents to the ER for evaluation of epigastric abdominal pain.  Please see above for details of HPI and exam.  Patient reports alcoholic binge this weekend, last drink on Sunday.  He arrives hypertensive and tachycardic with mild tremulousness.  Denies any history of withdrawals or withdrawal seizures.  I am concerned for possible alcohol withdrawal.  He does have mild epigastric tenderness.  I considered broad differential including gastritis, GERD, PUD, pancreatitis, gallbladder/hepatobiliary pathology amongst other things.  Lab studies remarkable for elevated lipase, mild transaminitis, and CT scan consistent with acute pancreatitis with some changes suggesting acute necrotic collections.  No concerns at this time for infected necrosis. Other incidental findings noted above also discussed with the patient which will require outpatient follow-up and he understands and agrees.  Suspect alcohol induced pancreatitis with alcohol withdrawal symptoms.  He remains hemodynamically stable but will be admitted for pain and nausea management as well as withdrawal management.  CIWA initiated.  He is amenable with this.  I discussed the case with Dr. Awan who accepted the patient.      Diagnosis:    ICD-10-CM    1. Alcohol-induced acute pancreatitis with uninfected necrosis  K85.21    2. Alcohol withdrawal, uncomplicated (H)  F10.230    3. Pulmonary nodules  R91.8        Scribe  Disclosure:  I, Mely Garcia, am serving as a scribe at 1:12 PM on 10/19/2021 to document services personally performed by Tom Frausto MD based on my observations and the provider's statements to me.      Tom Frausto MD  10/19/21 [id number]

## 2021-10-19 NOTE — ED TRIAGE NOTES
Upper abominal pain and dry heaving. Started around 2:30 am. Pain is constant but get worse in waves. Reports some alcohol use.

## 2021-10-20 LAB
ALBUMIN SERPL-MCNC: 3.2 G/DL (ref 3.4–5)
ALP SERPL-CCNC: 55 U/L (ref 40–150)
ALT SERPL W P-5'-P-CCNC: 48 U/L (ref 0–70)
ANION GAP SERPL CALCULATED.3IONS-SCNC: 8 MMOL/L (ref 3–14)
AST SERPL W P-5'-P-CCNC: 41 U/L (ref 0–45)
ATRIAL RATE - MUSE: 105 BPM
BASOPHILS # BLD AUTO: 0 10E3/UL (ref 0–0.2)
BASOPHILS NFR BLD AUTO: 0 %
BILIRUB SERPL-MCNC: 0.8 MG/DL (ref 0.2–1.3)
BUN SERPL-MCNC: 13 MG/DL (ref 7–30)
CALCIUM SERPL-MCNC: 8.3 MG/DL (ref 8.5–10.1)
CHLORIDE BLD-SCNC: 97 MMOL/L (ref 94–109)
CO2 SERPL-SCNC: 25 MMOL/L (ref 20–32)
CREAT SERPL-MCNC: 0.79 MG/DL (ref 0.66–1.25)
DIASTOLIC BLOOD PRESSURE - MUSE: NORMAL MMHG
EOSINOPHIL # BLD AUTO: 0.1 10E3/UL (ref 0–0.7)
EOSINOPHIL NFR BLD AUTO: 1 %
ERYTHROCYTE [DISTWIDTH] IN BLOOD BY AUTOMATED COUNT: 12.2 % (ref 10–15)
GFR SERPL CREATININE-BSD FRML MDRD: >90 ML/MIN/1.73M2
GLUCOSE BLD-MCNC: 118 MG/DL (ref 70–99)
GLUCOSE BLDC GLUCOMTR-MCNC: 119 MG/DL (ref 70–99)
HCT VFR BLD AUTO: 44.4 % (ref 40–53)
HGB BLD-MCNC: 15.3 G/DL (ref 13.3–17.7)
IMM GRANULOCYTES # BLD: 0 10E3/UL
IMM GRANULOCYTES NFR BLD: 0 %
INTERPRETATION ECG - MUSE: NORMAL
LIPASE SERPL-CCNC: 7700 U/L (ref 73–393)
LYMPHOCYTES # BLD AUTO: 0.4 10E3/UL (ref 0.8–5.3)
LYMPHOCYTES NFR BLD AUTO: 4 %
MCH RBC QN AUTO: 36.6 PG (ref 26.5–33)
MCHC RBC AUTO-ENTMCNC: 34.5 G/DL (ref 31.5–36.5)
MCV RBC AUTO: 106 FL (ref 78–100)
MONOCYTES # BLD AUTO: 0.6 10E3/UL (ref 0–1.3)
MONOCYTES NFR BLD AUTO: 6 %
NEUTROPHILS # BLD AUTO: 9.1 10E3/UL (ref 1.6–8.3)
NEUTROPHILS NFR BLD AUTO: 89 %
NRBC # BLD AUTO: 0 10E3/UL
NRBC BLD AUTO-RTO: 0 /100
P AXIS - MUSE: 40 DEGREES
PLATELET # BLD AUTO: 192 10E3/UL (ref 150–450)
POTASSIUM BLD-SCNC: 4.2 MMOL/L (ref 3.4–5.3)
PR INTERVAL - MUSE: 188 MS
PROT SERPL-MCNC: 7 G/DL (ref 6.8–8.8)
QRS DURATION - MUSE: 82 MS
QT - MUSE: 348 MS
QTC - MUSE: 459 MS
R AXIS - MUSE: 57 DEGREES
RBC # BLD AUTO: 4.18 10E6/UL (ref 4.4–5.9)
SODIUM SERPL-SCNC: 130 MMOL/L (ref 133–144)
SYSTOLIC BLOOD PRESSURE - MUSE: NORMAL MMHG
T AXIS - MUSE: 34 DEGREES
TRIGL SERPL-MCNC: 291 MG/DL
VENTRICULAR RATE- MUSE: 105 BPM
WBC # BLD AUTO: 10.3 10E3/UL (ref 4–11)

## 2021-10-20 PROCEDURE — 250N000011 HC RX IP 250 OP 636: Performed by: INTERNAL MEDICINE

## 2021-10-20 PROCEDURE — 82040 ASSAY OF SERUM ALBUMIN: CPT | Performed by: INTERNAL MEDICINE

## 2021-10-20 PROCEDURE — 85025 COMPLETE CBC W/AUTO DIFF WBC: CPT | Performed by: INTERNAL MEDICINE

## 2021-10-20 PROCEDURE — 99232 SBSQ HOSP IP/OBS MODERATE 35: CPT | Performed by: INTERNAL MEDICINE

## 2021-10-20 PROCEDURE — 250N000009 HC RX 250: Performed by: INTERNAL MEDICINE

## 2021-10-20 PROCEDURE — 258N000003 HC RX IP 258 OP 636: Performed by: INTERNAL MEDICINE

## 2021-10-20 PROCEDURE — 84478 ASSAY OF TRIGLYCERIDES: CPT | Performed by: PHYSICIAN ASSISTANT

## 2021-10-20 PROCEDURE — 250N000013 HC RX MED GY IP 250 OP 250 PS 637: Performed by: INTERNAL MEDICINE

## 2021-10-20 PROCEDURE — 120N000001 HC R&B MED SURG/OB

## 2021-10-20 PROCEDURE — 36415 COLL VENOUS BLD VENIPUNCTURE: CPT | Performed by: INTERNAL MEDICINE

## 2021-10-20 PROCEDURE — 83690 ASSAY OF LIPASE: CPT | Performed by: INTERNAL MEDICINE

## 2021-10-20 PROCEDURE — 250N000013 HC RX MED GY IP 250 OP 250 PS 637: Performed by: EMERGENCY MEDICINE

## 2021-10-20 RX ORDER — HYDROMORPHONE HYDROCHLORIDE 2 MG/1
2 TABLET ORAL EVERY 4 HOURS PRN
Status: DISCONTINUED | OUTPATIENT
Start: 2021-10-20 | End: 2021-10-21 | Stop reason: HOSPADM

## 2021-10-20 RX ADMIN — CLONIDINE HYDROCHLORIDE 0.1 MG: 0.1 TABLET ORAL at 05:32

## 2021-10-20 RX ADMIN — GABAPENTIN 900 MG: 300 CAPSULE ORAL at 21:12

## 2021-10-20 RX ADMIN — MULTIPLE VITAMINS W/ MINERALS TAB 1 TABLET: TAB at 08:15

## 2021-10-20 RX ADMIN — SODIUM CHLORIDE: 9 INJECTION, SOLUTION INTRAVENOUS at 13:46

## 2021-10-20 RX ADMIN — GABAPENTIN 900 MG: 300 CAPSULE ORAL at 13:45

## 2021-10-20 RX ADMIN — THIAMINE HCL TAB 100 MG 100 MG: 100 TAB at 08:15

## 2021-10-20 RX ADMIN — CLONIDINE HYDROCHLORIDE 0.1 MG: 0.1 TABLET ORAL at 21:12

## 2021-10-20 RX ADMIN — HYDROMORPHONE HYDROCHLORIDE 0.5 MG: 1 INJECTION, SOLUTION INTRAMUSCULAR; INTRAVENOUS; SUBCUTANEOUS at 20:09

## 2021-10-20 RX ADMIN — ENOXAPARIN SODIUM 40 MG: 40 INJECTION SUBCUTANEOUS at 16:48

## 2021-10-20 RX ADMIN — HYDROMORPHONE HYDROCHLORIDE 0.5 MG: 1 INJECTION, SOLUTION INTRAMUSCULAR; INTRAVENOUS; SUBCUTANEOUS at 10:21

## 2021-10-20 RX ADMIN — CLONIDINE HYDROCHLORIDE 0.1 MG: 0.1 TABLET ORAL at 13:46

## 2021-10-20 RX ADMIN — ONDANSETRON 4 MG: 2 INJECTION INTRAMUSCULAR; INTRAVENOUS at 10:23

## 2021-10-20 RX ADMIN — HYDROMORPHONE HYDROCHLORIDE 0.5 MG: 1 INJECTION, SOLUTION INTRAMUSCULAR; INTRAVENOUS; SUBCUTANEOUS at 13:44

## 2021-10-20 RX ADMIN — ONDANSETRON 4 MG: 2 INJECTION INTRAMUSCULAR; INTRAVENOUS at 20:09

## 2021-10-20 RX ADMIN — HYDROMORPHONE HYDROCHLORIDE 0.5 MG: 1 INJECTION, SOLUTION INTRAMUSCULAR; INTRAVENOUS; SUBCUTANEOUS at 08:16

## 2021-10-20 RX ADMIN — HYDROMORPHONE HYDROCHLORIDE 0.5 MG: 1 INJECTION, SOLUTION INTRAMUSCULAR; INTRAVENOUS; SUBCUTANEOUS at 02:08

## 2021-10-20 RX ADMIN — GABAPENTIN 900 MG: 300 CAPSULE ORAL at 05:33

## 2021-10-20 RX ADMIN — PANTOPRAZOLE SODIUM 40 MG: 40 TABLET, DELAYED RELEASE ORAL at 08:15

## 2021-10-20 RX ADMIN — FOLIC ACID: 5 INJECTION, SOLUTION INTRAMUSCULAR; INTRAVENOUS; SUBCUTANEOUS at 16:49

## 2021-10-20 RX ADMIN — HYDROMORPHONE HYDROCHLORIDE 0.5 MG: 1 INJECTION, SOLUTION INTRAMUSCULAR; INTRAVENOUS; SUBCUTANEOUS at 05:32

## 2021-10-20 RX ADMIN — FOLIC ACID 1 MG: 1 TABLET ORAL at 08:15

## 2021-10-20 RX ADMIN — SENNOSIDES 8.6 MG: 8.6 TABLET, FILM COATED ORAL at 17:41

## 2021-10-20 ASSESSMENT — ACTIVITIES OF DAILY LIVING (ADL)
ADLS_ACUITY_SCORE: 3

## 2021-10-20 NOTE — PLAN OF CARE
A&Ox4. VSS ex HTN. C/o upper abd pain, giving prn dilaudid q2. Zofran given x1 for nausea. NPO ex ice chips & meds. SBA. R PIV infusing NS + multivit bag @ 100 ml/hr. CIWA 8, 3. 1 mg ativan given. Prn miralax given for constipation, no results yet. Will continue to monitor and follow POC.

## 2021-10-20 NOTE — PLAN OF CARE
Dilaudid IV given for abdominal pain. NPO. AxO 4. CIWA 2,2,1. Zofran given for nausea with relief. NS running at 125 ml/hour. SBA.

## 2021-10-20 NOTE — CONSULTS
"GASTROENTEROLOGY CONSULTATION      Tello Jones  [address] [facility] [address]  35 year old male     Admission Date/Time: 10/19/2021  Primary Care Provider: [facility], [facility]     We were asked to see the patient in consultation by Dr. Brock for evaluation of pancreatitis.    CC: abdominal pain     HPI:  Tello Jones is a 35 year old male with past medical history of anxiety, depression, ADHD, GERD, admitted 10/19 with symptoms of abdominal pain. Workup notable for acute pancreatitis with possible pancreatic necrosis.     Patient reports he awoke yesterday morning around 2am with right upper quadrant abdominal pain. He felt it was related to indigestion but the pain progressed throughout the day and started to involve initially his entire upper abdomen and then became more diffuse and radiated to his back. He felt he needed to belch but was not able to. He has not had BM in a couple days. Felt sweaty but did not take temperature. No nausea, vomiting.     He endorses drinking alcohol on a regular basis, more heavily on the weekends. However, is very elusive on amount stating \"I am a single 35 year old donna that lives on [place] with lots of friends.\" According to admitting note, he reported 5-6 beers over this past weekend (last drink 3 days ago). No history of pancreatitis in the past. Does not smoke. No new medications.     On admission, labs showed elevated lipase 10,927, and mildly elevated transaminases ALT 77, AST 79, with normal alk phos and bilirubin. Transaminases have normalized today and lipase down to 7700. ETOH level undetectable. WBC elevated 11.1, normal creatinine.     CT abd/pelvis showed diffuse hepatic steatosis, severe pancreatic edema with surrounding small to moderate fluid, irregular appearing fluid inferior to the pancreatic tail concerning for possible necrosis, along with fluid int he right abdomen along the pericolic gutter without any pancreatic ductal " dilation.     Since admission, he has had noted tremors and has been placed on CIWA protocol. Reports pain controlled with pain medication but recurs after it wears off. Getting 0.5mg Dilaudid every 2-3 hours.      PAST MEDICAL HISTORY:  Patient Active Problem List    Diagnosis Date Noted     Alcohol withdrawal, uncomplicated (H) 10/19/2021     Priority: Medium     Alcohol-induced acute pancreatitis with uninfected necrosis 10/19/2021     Priority: Medium        ROS: A comprehensive ten point review of systems was negative aside from those in mentioned in the HPI.       MEDICATIONS:   Prior to Admission medications    Medication Sig Start Date End Date Taking? Authorizing Provider   amitriptyline (ELAVIL) 25 MG tablet Take 25 mg by mouth nightly as needed for sleep   Yes Unknown, Entered By History   cetirizine (ZYRTEC) 10 MG tablet Take 10 mg by mouth daily   Yes Unknown, Entered By History   omeprazole (PRILOSEC) 20 MG DR capsule Take 20 mg by mouth daily   Yes Unknown, Entered By History   sertraline (ZOLOFT) 100 MG tablet Take 100 mg by mouth daily   Yes Unknown, Entered By History   traZODone (DESYREL) 100 MG tablet Take 100 mg by mouth nightly as needed for sleep   Yes Unknown, Entered By History        ALLERGIES: No Known Allergies     SOCIAL HISTORY:  Social History     Tobacco Use     Smoking status: Not on file   Substance Use Topics     Alcohol use: Not on file     Drug use: Not on file        FAMILY HISTORY:  Denies any family history of pancreatic cancer, pancreatitis.      PHYSICAL EXAM:   BP (!) 157/106 (BP Location: Right arm)   Pulse 82   Temp 98.2  F (36.8  C) (Oral)   Resp 18   Ht 1.829 m (6')   Wt 96.2 kg (212 lb)   SpO2 99%   BMI 28.75 kg/m       PHYSICAL EXAM:  General: alert, oriented, NAD  SKIN: no suspicious lesions, rashes, jaundice, or spider angiomas  HEAD: Normocephalic. No masses, lesions, tenderness or abnormalities  NECK: Neck supple. No adenopathy. Thyroid symmetric, normal  size.  EYES: No scleral icterus  ENT: ENT exam normal, no neck nodes or sinus tenderness  RESPIRATORY: negative, Good diaphragmatic excursion. Lungs clear  CARDIOVASCULAR: negative, PMI normal. No lifts, heaves, or thrills. RRR. No murmurs, clicks gallops or rub  GASTROINTESTINAL: +BS, soft, mildly tender diffusely upper abd, ND, no HSM, no masses/guarding/rebound  JOINT/EXTREMITIES: extremities normal- no gross deformities noted, gait normal and normal muscle tone  NEURO: Reflexes grossly normal and symmetric. Sensation grossly WNL.  PSYCH: no abnormal anxiety/depression  LYMPH: No anterior cervical, posterior cervical, or supraclavicular adenopathy     LABS:  I reviewed the patient's new clinical lab test results.   Recent Labs   Lab Test 10/20/21  0659 10/19/21  1306   WBC 10.3 11.1*   HGB 15.3 17.3   * 101*    279     Recent Labs   Lab Test 10/20/21  0659 10/19/21  1259   * 132*   POTASSIUM 4.2 3.8   CHLORIDE 97 96   CO2 25 25   BUN 13 12   ANIONGAP 8 11   OVIDIO 8.3* 10.0     Recent Labs   Lab Test 10/20/21  0659 10/19/21  1259   ALBUMIN 3.2* 4.6   BILITOTAL 0.8 0.8   ALT 48 77*   AST 41 79*   ALKPHOS 55 75   LIPASE 7,700* 10,927*        IMAGING  I personally reviewed the patient's new imaging results.    CT ABDOMEN/PELVIS WITH CONTRAST October 19, 2021 1:44 PM     CLINICAL HISTORY: Nausea/vomiting. Abdominal pain, acute,  nonlocalized.     TECHNIQUE: CT scan of the abdomen and pelvis was performed following  injection of IV contrast. Multiplanar reformats were obtained. Dose  reduction techniques were used.  CONTRAST: 100 mL Isovue-370.     COMPARISON: None.     FINDINGS:   LOWER CHEST: No acute abnormality. 0.2 cm right lower lobe nodule near  the major fissure series 4 image 1.     HEPATOBILIARY: Diffuse hepatic steatosis. No acute liver or  gallbladder abnormality. No calcified stones.     PANCREAS: There is severe pancreas edema with surrounding small to  moderate fluid. Irregular fluid  lying inferior to the pancreas tail is  approximately 9.0 x 3.7 cm series 4 image 84. Some free fluid layers  into the right inferior abdomen as well along the pericolic gutter.  The pancreas parenchyma enhances homogeneously. No focal fluid within  the pancreas itself. No pancreatic duct dilatation or calcification.     SPLEEN: Normal.     ADRENAL GLANDS: Small left adrenal nodule is 0.8 cm image 55. Though  nonspecific these are commonly adenomas. Normal right adrenal.     KIDNEYS/BLADDER: No significant mass, stones, or hydronephrosis. There  are simple or benign cysts. No follow up is needed.     BOWEL: Normal appendix. No acute bowel abnormality. No obstruction.     PELVIC ORGANS: Heterogeneous prostate and seminal vesicles. There is  some nonspecific nodularity of the seminal vesicles.     ADDITIONAL FINDINGS: None.     MUSCULOSKELETAL: No acute abnormality. Small peripherally sclerotic  lucency at the left innominate series 4 image 180.                                                                      IMPRESSION:   1.  Pancreatitis identified with small to moderate adjacent fluid  suggesting acute necrotic collections.  2.  Fatty liver.  3.  No other acute abnormality.  4.  Some nonspecific heterogeneity of the prostate and seminal  vesicles showing areas of small nodularity. If there is clinical  concern, urologic assessment may be needed.  5.  Technically nonspecific peripherally sclerotic lesion at the left  innominate bone.  6.  Tiny pulmonary nodule on the right, see below for follow up as  needed.     CONSULTATION ASSESSMENT AND PLAN:    35 year old male with past medical history of anxiety, depression, ADHD, GERD, admitted 10/19 with symptoms of abdominal pain. Lipase 10,000 with mild ALT/AST elevation. CT scan shows evidence of acute pancreatitis with adjacent fluid collections with some concern for pancreatic necrosis near the tail.     1. Acute pancreatitis complicated by possible pancreatic  necrosis. Etiology is most likely alcohol. Patient not forthright about amount of alcohol consumption but admits to regular use and has some symptoms indicating possible alcohol withdrawal this admission. Pattern of ALT/AST elevation more indicative of alcohol induced hepatitis rather than biliary obstruction. These have normalized today. No inciting medications. Has mild leukocytosis that is likely reactionary. Creatinine normal.   --Will check TG level.   --NPO until requiring less pain medication.   --IVFs, prn pain medication/antiemetics.   --No need for antibiotics at this time.   --No indication for endoscopic procedures at this time.   --Avoid alcohol. Consider chem dep.   --Bowel regimen.     2. ETOH withdrawal. On CIWA protocol.     Reviewed with Dr. Zhong.     Thank you for asking us to participate in the care of this patient.      Luz Angela, PAC  [facility] [place] ([facility])

## 2021-10-20 NOTE — PROGRESS NOTES
[facility]    Hospitalist Progress Note      Assessment & Plan   Tello Jones is a 35 year old male who was admitted on 10/19/2021.    Summary of Stay:   Tello Jones is a 35 year old male patient with past medical history of anxiety, depression, ADHD, GERD came to emergency room for evaluation for abdominal pain.Patient stated that he has been drinking 5-6 beers over the weekend. His last drink was on Sunday.    Lab workup elevated lipase at 10,927. CT of the abdomen/pelvis was done and showed pancreatitis identified with small to moderate adjacent fluid suggesting acute necrotic collections. He was given 2 liters of normal saline in the ER. He was also started on alcohol withdrawal protocol. He was admitted to the hospital for further management.     Plan:    Acute alcoholic pancreatitis with necrosis.  -Conservative treatment with IV fluids, pain medications, n.p.o. status.  -Still requiring IV pain medications.  -Start oral diet once pain medication requirement has come down significantly.    Alcohol use disorder  -Monitor for alcohol withdrawal.  On CIWA protocol with lorazepam, gabapentin and clonidine.    Hypertension  -Likely reactive.  Monitor.  On clonidine per alcohol withdrawal protocol  -Recommended outpatient follow-up.    Hyponatremia  -On normal saline.  Monitor.      Incidental findings:  Small left adrenal nodule is 0.8 cm   0.2 cm right lower lobe nodule    nonspecific heterogeneity of the prostate and seminal  vesicles showing areas of small nodularity.  nonspecific peripherally sclerotic lesion at the left  innominate bone.    DVT Prophylaxis: Enoxaparin (Lovenox) SQ  Code Status: Full Code  Expected discharge: 2-3 days    West Kirby MD  Text Page (7am - 6pm, M-F)    Interval History   Patient was evaluated with nursing staff. Overnight issues discussed.    Review of systems:    Overall appears comfortable.  Still requiring IV pain medications for abdominal  pain.    No chest pain/palpitations.  No new cough/shortness of breath.  No headache/visual disturbance/new weakness.    -Data reviewed today: Labs and medications.    Physical Exam   Temp: 98.2  F (36.8  C) Temp src: Oral BP: (!) 157/106 Pulse: 82   Resp: 18 SpO2: 99 % O2 Device: None (Room air)    Vitals:    10/19/21 1257 10/19/21 1728   Weight: 90.7 kg (200 lb) 96.2 kg (212 lb)     Vital Signs with Ranges  Temp:  [97.4  F (36.3  C)-98.2  F (36.8  C)] 98.2  F (36.8  C)  Pulse:  [] 82  Resp:  [16-41] 18  BP: (152-175)/(101-126) 157/106  SpO2:  [98 %-100 %] 99 %  I/O last 3 completed shifts:  In: 684 [I.V.:684]  Out: -     Constitutional: Awake, alert, cooperative, no apparent distress  HEENT: Trachea midline, sclera is clear   Respiratory: No crackles. No wheezing. Equal breath sounds bilaterally.  Cardiovascular: Regular rate and rhythm, normal S1 and S2, and no murmur noted  GI: Normal bowel sounds, soft, non-distended, tender in the midabdomen  Skin/Integumen: No rashes, no cyanosis  Psych: appropriate affect, no agitation   Extremities: No pitting edema     Medications     sodium chloride 125 mL/hr at 10/20/21 1013       cloNIDine  0.1 mg Oral Q8H     enoxaparin ANTICOAGULANT  40 mg Subcutaneous Q24H     folic acid  1 mg Oral Daily     [START ON 10/26/2021] gabapentin  100 mg Oral Q8H     [START ON 10/24/2021] gabapentin  300 mg Oral Q8H     [START ON 10/22/2021] gabapentin  600 mg Oral Q8H     gabapentin  900 mg Oral Q8H     multivitamin w/minerals  1 tablet Oral Daily     pantoprazole  40 mg Oral QAM AC     sodium chloride (PF)  3 mL Intracatheter Q8H     IV Fluid with vitamins   Intravenous Q24H     thiamine  100 mg Oral Daily       Data   Recent Labs   Lab 10/20/21  0659 10/20/21  0506 10/19/21  1306 10/19/21  1259   WBC 10.3  --  11.1*  --    HGB 15.3  --  17.3  --    *  --  101*  --      --  279  --    *  --   --  132*   POTASSIUM 4.2  --   --  3.8   CHLORIDE 97  --   --  96    CO2 25  --   --  25   BUN 13  --   --  12   CR 0.79  --   --  0.89  0.84   ANIONGAP 8  --   --  11   OVIDIO 8.3*  --   --  10.0   * 119*  --  109*   ALBUMIN 3.2*  --   --  4.6   PROTTOTAL 7.0  --   --  9.2*   BILITOTAL 0.8  --   --  0.8   ALKPHOS 55  --   --  75   ALT 48  --   --  77*   AST 41  --   --  79*   LIPASE 7,700*  --   --  10,927*   TROPONIN  --   --   --  <0.015       Recent Results (from the past 24 hour(s))   CT Abdomen Pelvis w Contrast    Narrative    CT ABDOMEN/PELVIS WITH CONTRAST October 19, 2021 1:44 PM    CLINICAL HISTORY: Nausea/vomiting. Abdominal pain, acute,  nonlocalized.    TECHNIQUE: CT scan of the abdomen and pelvis was performed following  injection of IV contrast. Multiplanar reformats were obtained. Dose  reduction techniques were used.  CONTRAST: 100 mL Isovue-370.    COMPARISON: None.    FINDINGS:   LOWER CHEST: No acute abnormality. 0.2 cm right lower lobe nodule near  the major fissure series 4 image 1.    HEPATOBILIARY: Diffuse hepatic steatosis. No acute liver or  gallbladder abnormality. No calcified stones.    PANCREAS: There is severe pancreas edema with surrounding small to  moderate fluid. Irregular fluid lying inferior to the pancreas tail is  approximately 9.0 x 3.7 cm series 4 image 84. Some free fluid layers  into the right inferior abdomen as well along the pericolic gutter.  The pancreas parenchyma enhances homogeneously. No focal fluid within  the pancreas itself. No pancreatic duct dilatation or calcification.    SPLEEN: Normal.    ADRENAL GLANDS: Small left adrenal nodule is 0.8 cm image 55. Though  nonspecific these are commonly adenomas. Normal right adrenal.    KIDNEYS/BLADDER: No significant mass, stones, or hydronephrosis. There  are simple or benign cysts. No follow up is needed.    BOWEL: Normal appendix. No acute bowel abnormality. No obstruction.    PELVIC ORGANS: Heterogeneous prostate and seminal vesicles. There is  some nonspecific nodularity of  the seminal vesicles.    ADDITIONAL FINDINGS: None.    MUSCULOSKELETAL: No acute abnormality. Small peripherally sclerotic  lucency at the left innominate series 4 image 180.      Impression    IMPRESSION:   1.  Pancreatitis identified with small to moderate adjacent fluid  suggesting acute necrotic collections.  2.  Fatty liver.  3.  No other acute abnormality.  4.  Some nonspecific heterogeneity of the prostate and seminal  vesicles showing areas of small nodularity. If there is clinical  concern, urologic assessment may be needed.  5.  Technically nonspecific peripherally sclerotic lesion at the left  innominate bone.  6.  Tiny pulmonary nodule on the right, see below for follow up as  needed.    Recommendations for one or multiple incidental lung nodules < 6mm :    Low risk patients: No routine follow-up.    High risk patients: Optional follow-up CT at 12 months; if  unchanged, no further follow-up.    *Low Risk: Minimal or absent history of smoking or other known risk  factors.  *Nonsolid (ground glass) or partly solid nodules may require longer  follow-up to exclude indolent adenocarcinoma.  *Recommendations based on Guidelines for the Management of Incidental  Pulmonary Nodules Detected at CT: From the Fleischner Society 2017,  Radiology 2017.    CRISTÓBAL BUTT MD         SYSTEM ID:  [id number]

## 2021-10-20 NOTE — PROVIDER NOTIFICATION
"MD notified: \"Pt c/o 9-10/10 pain in upper abd. Here for pancreatitis. Has prn dilaudid q2 which is not helping w/ pain. Please advise. Thanks!\"  "

## 2021-10-21 VITALS
RESPIRATION RATE: 16 BRPM | HEIGHT: 72 IN | OXYGEN SATURATION: 97 % | DIASTOLIC BLOOD PRESSURE: 106 MMHG | HEART RATE: 103 BPM | SYSTOLIC BLOOD PRESSURE: 158 MMHG | BODY MASS INDEX: 28.71 KG/M2 | WEIGHT: 212 LBS | TEMPERATURE: 98.7 F

## 2021-10-21 LAB
ANION GAP SERPL CALCULATED.3IONS-SCNC: 8 MMOL/L (ref 3–14)
BUN SERPL-MCNC: 9 MG/DL (ref 7–30)
CALCIUM SERPL-MCNC: 8 MG/DL (ref 8.5–10.1)
CHLORIDE BLD-SCNC: 94 MMOL/L (ref 94–109)
CO2 SERPL-SCNC: 26 MMOL/L (ref 20–32)
CREAT SERPL-MCNC: 0.79 MG/DL (ref 0.66–1.25)
GFR SERPL CREATININE-BSD FRML MDRD: >90 ML/MIN/1.73M2
GLUCOSE BLD-MCNC: 93 MG/DL (ref 70–99)
POTASSIUM BLD-SCNC: 4 MMOL/L (ref 3.4–5.3)
SODIUM SERPL-SCNC: 128 MMOL/L (ref 133–144)

## 2021-10-21 PROCEDURE — 250N000011 HC RX IP 250 OP 636: Performed by: INTERNAL MEDICINE

## 2021-10-21 PROCEDURE — 250N000013 HC RX MED GY IP 250 OP 250 PS 637: Performed by: INTERNAL MEDICINE

## 2021-10-21 PROCEDURE — 250N000013 HC RX MED GY IP 250 OP 250 PS 637: Performed by: EMERGENCY MEDICINE

## 2021-10-21 PROCEDURE — 36415 COLL VENOUS BLD VENIPUNCTURE: CPT | Performed by: INTERNAL MEDICINE

## 2021-10-21 PROCEDURE — 99239 HOSP IP/OBS DSCHRG MGMT >30: CPT | Performed by: INTERNAL MEDICINE

## 2021-10-21 PROCEDURE — 80048 BASIC METABOLIC PNL TOTAL CA: CPT | Performed by: INTERNAL MEDICINE

## 2021-10-21 PROCEDURE — 258N000003 HC RX IP 258 OP 636: Performed by: INTERNAL MEDICINE

## 2021-10-21 RX ORDER — GABAPENTIN 300 MG/1
300 CAPSULE ORAL 3 TIMES DAILY
Qty: 9 CAPSULE | Refills: 0 | Status: SHIPPED | OUTPATIENT
Start: 2021-10-25 | End: 2024-07-15

## 2021-10-21 RX ORDER — MULTIVIT WITH MINERALS/LUTEIN
1 TABLET ORAL DAILY
COMMUNITY
Start: 2021-10-21

## 2021-10-21 RX ORDER — GABAPENTIN 300 MG/1
CAPSULE ORAL
Qty: 18 CAPSULE | Refills: 0 | Status: SHIPPED | OUTPATIENT
Start: 2021-10-21 | End: 2021-10-25

## 2021-10-21 RX ADMIN — GABAPENTIN 900 MG: 300 CAPSULE ORAL at 15:03

## 2021-10-21 RX ADMIN — MULTIPLE VITAMINS W/ MINERALS TAB 1 TABLET: TAB at 08:13

## 2021-10-21 RX ADMIN — CLONIDINE HYDROCHLORIDE 0.1 MG: 0.1 TABLET ORAL at 06:46

## 2021-10-21 RX ADMIN — THIAMINE HCL TAB 100 MG 100 MG: 100 TAB at 08:13

## 2021-10-21 RX ADMIN — HYDROMORPHONE HYDROCHLORIDE 0.5 MG: 1 INJECTION, SOLUTION INTRAMUSCULAR; INTRAVENOUS; SUBCUTANEOUS at 01:08

## 2021-10-21 RX ADMIN — CLONIDINE HYDROCHLORIDE 0.1 MG: 0.1 TABLET ORAL at 15:04

## 2021-10-21 RX ADMIN — SODIUM CHLORIDE: 9 INJECTION, SOLUTION INTRAVENOUS at 08:14

## 2021-10-21 RX ADMIN — SODIUM CHLORIDE: 9 INJECTION, SOLUTION INTRAVENOUS at 03:05

## 2021-10-21 RX ADMIN — GABAPENTIN 900 MG: 300 CAPSULE ORAL at 06:46

## 2021-10-21 RX ADMIN — FOLIC ACID 1 MG: 1 TABLET ORAL at 08:13

## 2021-10-21 RX ADMIN — PANTOPRAZOLE SODIUM 40 MG: 40 TABLET, DELAYED RELEASE ORAL at 06:46

## 2021-10-21 ASSESSMENT — ACTIVITIES OF DAILY LIVING (ADL)
ADLS_ACUITY_SCORE: 3

## 2021-10-21 NOTE — PROGRESS NOTES
GASTROENTEROLOGY PROGRESS NOTE        SUBJECTIVE:  Patient denies any abdominal pain.  He does report some abdominal fullness.  No fevers and no chills.  He has not required any pain medication overnight.  He is drinking some water.  No nausea or vomiting.     OBJECTIVE:    BP (!) 150/95 (BP Location: Right arm)   Pulse 103   Temp 98.7  F (37.1  C) (Oral)   Resp 16   Ht 1.829 m (6')   Wt 96.2 kg (212 lb)   SpO2 97%   BMI 28.75 kg/m    Temp (24hrs), Av.6  F (37  C), Min:98.5  F (36.9  C), Max:98.7  F (37.1  C)    Patient Vitals for the past 72 hrs:   Weight   10/19/21 1728 96.2 kg (212 lb)   10/19/21 1257 90.7 kg (200 lb)       Intake/Output Summary (Last 24 hours) at 10/21/2021 0933  Last data filed at 10/21/2021 0305  Gross per 24 hour   Intake 3 ml   Output --   Net 3 ml        PHYSICAL EXAM     Constitutional: No distress, resting comfortably.    Abdomen: Soft, minimal epigastric tenderness.          Additional Comments:  ROS, FH, SH: See initial GI consult for details.     I have reviewed the patient's new clinical lab results:     Recent Labs   Lab Test 10/20/21  0659 10/19/21  1306   WBC 10.3 11.1*   HGB 15.3 17.3   * 101*    279     Recent Labs   Lab Test 10/21/21  0559 10/20/21  0659 10/19/21  1259   POTASSIUM 4.0 4.2 3.8   CHLORIDE 94 97 96   CO2 26 25 25   BUN 9 13 12   ANIONGAP 8 8 11     Recent Labs   Lab Test 10/20/21  0659 10/19/21  1259   ALBUMIN 3.2* 4.6   BILITOTAL 0.8 0.8   ALT 48 77*   AST 41 79*   LIPASE 7,700* 10,927*        ASSESSMENT/ PLAN  Tello Jones is a 35-year-old male with history of anxiety, depression, alcohol abuse who presented to the hospital with right upper quadrant pain and was found to have severe acute pancreatitis imaging with a lipase of 9-year-old gallstones on imaging bilirubin/alkaline phosphatase are normal.    1.  Pancreatitis, acute: Severe with evolving peripancreatic fluid collection.  Patient is afebrile without leukocytosis.  His lipase  is improving.  He no longer has any abdominal pain.  He has not been requiring any pain medications for the past 8 hours.  LFTs continue to be normal.  Triglycerides are 291.  -- Continue IV fluids, pain control as needed.  -- Advance to clear liquid diet, advance diet as tolerated  -- Encouraged alcohol cessation    Discussed with Dr. Zhong.  Melida Faustin PA-C  [facility] ( [facility])

## 2021-10-21 NOTE — PROGRESS NOTES
Pt read and signed discharge instructions,verbalized understanding of meds. Med rx sent home with pt. Friend transporting pt to home.

## 2021-10-21 NOTE — PLAN OF CARE
A&Ox4. VSS ex HTN. Pain has been better this evening. Prn dilaudid given x1. Zofran given x1 for nausea. NPO ex ice chips & meds. SBA. L PIV infusing NS + multivit bag @ 100 ml/hr. CIWA 1, 4. Prn senna given for constipation. Discharge 2-3 days. Will continue to monitor and follow POC.

## 2021-10-21 NOTE — DISCHARGE SUMMARY
[facility]    Discharge Summary  Hospitalist    Date of Admission:  10/19/2021  Date of Discharge:  10/21/2021  Discharging Provider: West Kirby MD  Date of Service (when I saw the patient): 10/21/21    Discharge Diagnoses      Acute pancreatitis with necrosis.  Pancreatitis secondary to alcohol use.  Alcohol use disorder.  Hyponatremia, in the setting of alcohol use.    Incidental findings:  Small left adrenal nodule is 0.8 cm   0.2 cm right lower lobe nodule    nonspecific heterogeneity of the prostate and seminal  vesicles showing areas of small nodularity.  nonspecific peripherally sclerotic lesion at the left  innominate bone.      History of Present Illness      Tello Jones is a 35 year old male patient with past medical history of anxiety, depression, ADHD, GERD came to emergency room for evaluation for abdominal pain.Patient stated that he has been drinking 5-6 beers over the weekend. His last drink was on Sunday.     Lab workup elevated lipase at 10,927. CT of the abdomen/pelvis was done and showed pancreatitis identified with small to moderate adjacent fluid suggesting acute necrotic collections. He was given 2 liters of normal saline in the ER. He was also started on alcohol withdrawal protocol. He was admitted to the hospital for further management.     Hospital Course     Acute alcoholic pancreatitis with necrosis.  -Treated with conservative treatment with IV fluids, pain medications, n.p.o. status.  -Today he is feeling remarkably better.  Denies any abdominal pain.  Started on a diet which she tolerated well.  No pain after eating.  He is eager to go home.     Alcohol use disorder  -Monitor for alcohol withdrawal.  On CIWA protocol with lorazepam, gabapentin and clonidine.  -Overall the patient has done well.  No evidence of severe alcohol withdrawal.  Discharged on oral gabapentin.  -Alcohol cessation discussed with the patient.  He is willing to significantly cut back on  alcohol.     Hypertension  -Likely reactive.  Monitor.  -Recommended outpatient follow-up.     Hyponatremia  -Treated with IV normal saline.  Some drop in sodium noted.  But patient does not want to stay in the hospital any longer.  He wishes to follow-up in the outpatient setting.  Recommended repeat labs within a week.        Incidental findings:  Small left adrenal nodule is 0.8 cm   0.2 cm right lower lobe nodule    nonspecific heterogeneity of the prostate and seminal  vesicles showing areas of small nodularity.  nonspecific peripherally sclerotic lesion at the left  innominate bone.      I personally evaluated and examined the patient on the day of discharge.    West Kirby MD      Pending Results   These results will be followed up by PCP  Unresulted Labs Ordered in the Past 30 Days of this Admission     No orders found from 9/19/2021 to 10/20/2021.               Primary Care Physician   [facility]        Discharge Disposition   Discharged to home  Condition at discharge: Stable    Consultations This Hospital Stay   GASTROENTEROLOGY IP CONSULT    Time Spent on this Encounter   Discharge time: greater than 30 minutes.    Discharge Orders   No discharge procedures on file.         Review of your medicines      START taking      Dose / Directions   * gabapentin 300 MG capsule  Commonly known as: NEURONTIN      Start taking on: October 21, 2021  Take 2 capsules (600 mg) by mouth 3 times daily for 2 days, THEN 1 capsule (300 mg) 3 times daily for 2 days.  Quantity: 18 capsule  Refills: 0     * gabapentin 300 MG capsule  Commonly known as: NEURONTIN      Dose: 300 mg  Start taking on: October 25, 2021  Take 1 capsule (300 mg) by mouth 3 times daily for 3 days  Quantity: 9 capsule  Refills: 0     multivitamin tablet      Dose: 1 tablet  Take 1 tablet by mouth daily  Refills: 0         * This list has 2 medication(s) that are the same as other medications prescribed for you. Read the directions  carefully, and ask your doctor or other care provider to review them with you.            CONTINUE these medicines which have NOT CHANGED      Dose / Directions   amitriptyline 25 MG tablet  Commonly known as: ELAVIL      Dose: 25 mg  Take 25 mg by mouth nightly as needed for sleep  Refills: 0     cetirizine 10 MG tablet  Commonly known as: zyrTEC      Dose: 10 mg  Take 10 mg by mouth daily  Refills: 0     omeprazole 20 MG DR capsule  Commonly known as: priLOSEC      Dose: 20 mg  Take 20 mg by mouth daily  Refills: 0     sertraline 100 MG tablet  Commonly known as: ZOLOFT      Dose: 100 mg  Take 100 mg by mouth daily  Refills: 0     traZODone 100 MG tablet  Commonly known as: DESYREL      Dose: 100 mg  Take 100 mg by mouth nightly as needed for sleep  Refills: 0           Where to get your medicines      These medications were sent to [facility] [id number] [facility] [address]    Phone: [phone]     gabapentin 300 MG capsule    gabapentin 300 MG capsule     Some of these will need a paper prescription and others can be bought over the counter. Ask your nurse if you have questions.    You don't need a prescription for these medications    multivitamin tablet           Allergies   No Known Allergies  Data   Most Recent 3 CBC's:Recent Labs   Lab Test 10/20/21  0659 10/19/21  1306   WBC 10.3 11.1*   HGB 15.3 17.3   * 101*    279      Most Recent 3 BMP's:  Recent Labs   Lab Test 10/21/21  0559 10/20/21  0659 10/20/21  0506 10/19/21  1259   * 130*  --  132*   POTASSIUM 4.0 4.2  --  3.8   CHLORIDE 94 97  --  96   CO2 26 25  --  25   BUN 9 13  --  12   CR 0.79 0.79  --  0.89  0.84   ANIONGAP 8 8  --  11   OVIDIO 8.0* 8.3*  --  10.0   GLC 93 118* 119* 109*     Most Recent 2 LFT's:  Recent Labs   Lab Test 10/20/21  0659 10/19/21  1259   AST 41 79*   ALT 48 77*   ALKPHOS 55 75   BILITOTAL 0.8 0.8     Most Recent INR's and  Anticoagulation Dosing History:  Anticoagulation Dose History    There is no flowsheet data to display.       Most Recent 3 Troponin's:  Recent Labs   Lab Test 10/19/21  1259   TROPONIN <0.015     Most Recent Cholesterol Panel:  Recent Labs   Lab Test 10/20/21  0659   TRIG 291*     Most Recent 6 Bacteria Isolates From Any Culture (See EPIC Reports for Culture Details):No lab results found.  Most Recent TSH, T4 and A1c Labs:No lab results found.

## 2021-10-21 NOTE — PLAN OF CARE
VSS ex high BP. A/Ox4. Gave PRN dilaudid x1 for back and abdominal pain. Pt denies CP, SOB, and N/V. NPO except ice chips and meds. PIV infusing NS at 125 mL/hr. Pt now ind in room, educated on orthostatic BP and safe mobility. CIWA 1 and 2. Plan is to discharge in 2-3 days.

## 2021-10-21 NOTE — PLAN OF CARE
Problem: Adult Inpatient Plan of Care  Goal: Optimal Comfort and Wellbeing  Outcome: Improving     Problem: Alcohol Withdrawal  Goal: Alcohol Withdrawal Symptom Control  Outcome: Improving   Pt is alert and oriented. He is on continuous NS @75 mL/hr. Pt denied pain. CIWA score is 0 and 2. Denies anxiety. RA and sats in the 90s with clear lungs sound. Provider will gradually advance pt's diet until he is tolerating regular diet and he will be ready for discharge. Will continue to monitor

## 2021-10-31 ENCOUNTER — HEALTH MAINTENANCE LETTER (OUTPATIENT)
Age: 36
End: 2021-10-31

## 2022-03-23 NOTE — PLAN OF CARE
Alert and oriented x 4. VSS. LS clear. Up with SBA. BP elevated.  Reported abdominal pain 9/10. PRN dilaudid 0.5 mg given.  On CIWA assessment. Gastro following. NPO ex ice chips and meds.     Continue to monitor   slurred speech

## 2022-10-23 ENCOUNTER — HEALTH MAINTENANCE LETTER (OUTPATIENT)
Age: 37
End: 2022-10-23

## 2022-12-10 ENCOUNTER — HEALTH MAINTENANCE LETTER (OUTPATIENT)
Age: 37
End: 2022-12-10

## 2024-01-20 ENCOUNTER — HEALTH MAINTENANCE LETTER (OUTPATIENT)
Age: 39
End: 2024-01-20

## 2024-07-13 ENCOUNTER — HOSPITAL ENCOUNTER (EMERGENCY)
Facility: CLINIC | Age: 39
Discharge: HOME | End: 2024-07-13
Attending: EMERGENCY MEDICINE | Admitting: EMERGENCY MEDICINE
Payer: COMMERCIAL

## 2024-07-13 ENCOUNTER — APPOINTMENT (OUTPATIENT)
Dept: CT IMAGING | Facility: CLINIC | Age: 39
End: 2024-07-13
Attending: EMERGENCY MEDICINE
Payer: COMMERCIAL

## 2024-07-13 VITALS
WEIGHT: 200 LBS | SYSTOLIC BLOOD PRESSURE: 130 MMHG | TEMPERATURE: 98.4 F | OXYGEN SATURATION: 100 % | HEIGHT: 70 IN | HEART RATE: 88 BPM | DIASTOLIC BLOOD PRESSURE: 80 MMHG | BODY MASS INDEX: 28.63 KG/M2 | RESPIRATION RATE: 18 BRPM

## 2024-07-13 DIAGNOSIS — F10.929 ALCOHOLIC INTOXICATION WITH COMPLICATION (H): ICD-10-CM
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
## 2024-07-13 DIAGNOSIS — F10.20 ALCOHOL USE DISORDER, SEVERE, DEPENDENCE (H): ICD-10-CM

## 2024-07-13 DIAGNOSIS — S00.411A EAR ABRASION, RIGHT, INITIAL ENCOUNTER: ICD-10-CM

## 2024-07-13 PROCEDURE — 70450 CT HEAD/BRAIN W/O DYE: CPT

## 2024-07-13 PROCEDURE — 72125 CT NECK SPINE W/O DYE: CPT

## 2024-07-13 PROCEDURE — 99284 EMERGENCY DEPT VISIT MOD MDM: CPT | Mod: 25

## 2024-07-13 ASSESSMENT — ACTIVITIES OF DAILY LIVING (ADL)
ADLS_ACUITY_SCORE: 37
ADLS_ACUITY_SCORE: 37

## 2024-07-13 ASSESSMENT — COLUMBIA-SUICIDE SEVERITY RATING SCALE - C-SSRS
2. HAVE YOU ACTUALLY HAD ANY THOUGHTS OF KILLING YOURSELF IN THE PAST MONTH?: NO
1. IN THE PAST MONTH, HAVE YOU WISHED YOU WERE DEAD OR WISHED YOU COULD GO TO SLEEP AND NOT WAKE UP?: NO
6. HAVE YOU EVER DONE ANYTHING, STARTED TO DO ANYTHING, OR PREPARED TO DO ANYTHING TO END YOUR LIFE?: NO

## 2024-07-13 NOTE — ED NOTES
Pt was read his rights and given a copy of the hold. Pt is aware of being placed on a hold due to intoxication. All questions and concerns addressed. Pt verbalized understanding and is cooperative with being placed on a hold

## 2024-07-13 NOTE — ED TRIAGE NOTES
Pt arrive via EMS after falling off 5ft retaining wall. Per EMS pt is intoxicated and has a mild evulsion of posterior right ear. Pt arrives in a c-collar and VSS per EMS with .     Triage Assessment (Adult)       Row Name 07/13/24 0016          Triage Assessment    Airway WDL WDL        Respiratory WDL    Respiratory WDL WDL        Cardiac WDL    Cardiac WDL WDL        Peripheral/Neurovascular WDL    Peripheral Neurovascular WDL WDL        Cognitive/Neuro/Behavioral WDL    Cognitive/Neuro/Behavioral WDL WDL

## 2024-07-13 NOTE — ED PROVIDER NOTES
"    History     Chief Complaint:  Fall and Alcohol Intoxication       HPI   Tello Jones is a 38 year old male intoxicated [employer] stumble and fall from standing height in presence of GF.  No LOC HA NV.  No neck pain or focal neuro deficits.  Abrasion right ear lobe.    No pains.  No abd pain NVD.        Independent Historian:    GF and mom    Review of External Notes:      Medications:    amitriptyline (ELAVIL) 25 MG tablet  cetirizine (ZYRTEC) 10 MG tablet  gabapentin (NEURONTIN) 300 MG capsule  multivitamin (CENTRUM SILVER) tablet  omeprazole (PRILOSEC) 20 MG DR capsule  sertraline (ZOLOFT) 100 MG tablet  traZODone (DESYREL) 100 MG tablet        Past Medical History:    No past medical history on file.    Past Surgical History:    No past surgical history on file.       Physical Exam   Patient Vitals for the past 24 hrs:   BP Temp Temp src Pulse Resp SpO2 Height Weight   07/13/24 0015 128/87 98.4  F (36.9  C) Oral 88 18 100 % 1.778 m (5' 10\") 90.7 kg (200 lb)        Physical Exam  General: Patient is well appearing. Intoxicated. Head to toe trauma normal except as below.    Head: Atraumatic.  Rigth upper ear medial or cranial surface of pinna has abrasion without laceration.    Eyes: Conjunctivae injected and EOM are normal. No scleral icterus.  Pupils equal.    Neck: Normal range of motion. Neck supple.  No midline tenderness.   Cardiovascular: Normal rate, regular rhythm, normal heart sounds and intact distal pulses.   Pulmonary/Chest: Breath sounds normal. No respiratory distress.  Abdominal: Soft. Bowel sounds are normal. No distension. No tenderness. No rebound or guarding.   Musculoskeletal: Normal range of motion.  Safely ambulatory  Skin: Warm and dry. No rash noted. Not diaphoretic.      Emergency Department Course   ECG      Imaging:  CT Cervical Spine w/o Contrast   Final Result   IMPRESSION:   1.  No evidence of an acute displaced fracture.      Head CT w/o contrast   Final Result "   IMPRESSION:   1.  No acute intracranial hemorrhage or calvarial fracture.          Laboratory:  Labs Ordered and Resulted from Time of ED Arrival to Time of ED Departure - No data to display     Procedures       Emergency Department Course & Assessments:    Interventions:  Medications - No data to display     Assessments:      Independent Interpretation (X-rays, CTs, rhythm strip):  CT head and cervical spine.  No bleed fracture dislocation.      Consultations/Discussion of Management or Tests:         Social Determinants of Health affecting care:       Disposition:  The patient was discharged.    Impression & Plan           Medical Decision Makin YOM with alcohol use disorder without SI HI AH VH and sober ride and family that will take responsibility for him in mom.  Ear right has abrasion abx ointment no damage that can be repaired.  No auricular hematoma.  Head to toe trauma otherwise normal and CT head and neck reassuringly normal.   Discussed outpatient resources for cessation of alcohol.  Wound care and strict return and follow up instructions.      Diagnosis:    ICD-10-CM    1. Ear abrasion, right, initial encounter  S00.411A       2. Fall from slip, trip, or stumble, initial encounter  W01.0XXA       3. Alcoholic intoxication with complication (H24)  F10.929       4. Alcohol use disorder, severe, dependence (H)  F10.20            Discharge Medications:  New Prescriptions    No medications on file          2024   Clive Aguirre MD Stevens, Andrew C, MD  24 0248

## 2024-07-13 NOTE — ED NOTES
Pt removed c-collar that was placed by EMS. Pt was thoroughly educated on purpose and importance but refused c-collar being placed back on. Will inform MD

## 2024-07-15 ENCOUNTER — HOSPITAL ENCOUNTER (EMERGENCY)
Facility: CLINIC | Age: 39
Discharge: HOME | End: 2024-07-15
Attending: EMERGENCY MEDICINE | Admitting: EMERGENCY MEDICINE
Payer: COMMERCIAL

## 2024-07-15 VITALS
SYSTOLIC BLOOD PRESSURE: 167 MMHG | DIASTOLIC BLOOD PRESSURE: 114 MMHG | HEIGHT: 72 IN | BODY MASS INDEX: 25.05 KG/M2 | TEMPERATURE: 97.7 F | RESPIRATION RATE: 18 BRPM | HEART RATE: 90 BPM | WEIGHT: 184.97 LBS | OXYGEN SATURATION: 100 %

## 2024-07-15 DIAGNOSIS — S22.42XA CLOSED FRACTURE OF MULTIPLE RIBS OF LEFT SIDE, INITIAL ENCOUNTER: ICD-10-CM

## 2024-07-15 PROCEDURE — 250N000011 HC RX IP 250 OP 636: Performed by: EMERGENCY MEDICINE

## 2024-07-15 PROCEDURE — 99284 EMERGENCY DEPT VISIT MOD MDM: CPT

## 2024-07-15 PROCEDURE — 250N000013 HC RX MED GY IP 250 OP 250 PS 637: Performed by: EMERGENCY MEDICINE

## 2024-07-15 RX ORDER — SENNA AND DOCUSATE SODIUM 50; 8.6 MG/1; MG/1
1-2 TABLET, FILM COATED ORAL 2 TIMES DAILY PRN
Qty: 30 TABLET | Refills: 0 | Status: SHIPPED | OUTPATIENT
Start: 2024-07-15 | End: 2024-08-14

## 2024-07-15 RX ORDER — ACETAMINOPHEN 500 MG
1000 TABLET ORAL ONCE
Status: COMPLETED | OUTPATIENT
Start: 2024-07-15 | End: 2024-07-15

## 2024-07-15 RX ORDER — OXYCODONE HYDROCHLORIDE 5 MG/1
5 TABLET ORAL EVERY 6 HOURS PRN
Qty: 16 TABLET | Refills: 0 | Status: SHIPPED | OUTPATIENT
Start: 2024-07-15 | End: 2024-07-18

## 2024-07-15 RX ORDER — ONDANSETRON 4 MG/1
4 TABLET, ORALLY DISINTEGRATING ORAL EVERY 6 HOURS PRN
Qty: 20 TABLET | Refills: 0 | Status: SHIPPED | OUTPATIENT
Start: 2024-07-15 | End: 2024-09-11

## 2024-07-15 RX ORDER — IBUPROFEN 800 MG/1
800 TABLET, FILM COATED ORAL ONCE
Status: COMPLETED | OUTPATIENT
Start: 2024-07-15 | End: 2024-07-15

## 2024-07-15 RX ORDER — GABAPENTIN 300 MG/1
CAPSULE ORAL
Qty: 10 CAPSULE | Refills: 0 | Status: SHIPPED | OUTPATIENT
Start: 2024-07-15

## 2024-07-15 RX ORDER — OXYCODONE HYDROCHLORIDE 5 MG/1
10 TABLET ORAL ONCE
Status: COMPLETED | OUTPATIENT
Start: 2024-07-15 | End: 2024-07-15

## 2024-07-15 RX ORDER — ONDANSETRON 4 MG/1
4 TABLET, ORALLY DISINTEGRATING ORAL ONCE
Status: COMPLETED | OUTPATIENT
Start: 2024-07-15 | End: 2024-07-15

## 2024-07-15 RX ADMIN — ACETAMINOPHEN 1000 MG: 500 TABLET, FILM COATED ORAL at 17:35

## 2024-07-15 RX ADMIN — ONDANSETRON 4 MG: 4 TABLET, ORALLY DISINTEGRATING ORAL at 17:35

## 2024-07-15 RX ADMIN — OXYCODONE HYDROCHLORIDE 10 MG: 5 TABLET ORAL at 17:35

## 2024-07-15 RX ADMIN — IBUPROFEN 800 MG: 800 TABLET ORAL at 17:35

## 2024-07-15 ASSESSMENT — ACTIVITIES OF DAILY LIVING (ADL)
ADLS_ACUITY_SCORE: 37
ADLS_ACUITY_SCORE: 37

## 2024-07-15 NOTE — ED PROVIDER NOTES
Emergency Department Note      History of Present Illness     Chief Complaint   Abnormal Chest CT    HPI   Tello Jones is a 38 year old male with a history of alcohol withdrawal, anxiety, and depression who presents from  with abnormal CT results. He was seen at urgent care for worsening left rib pain and sent to the ED for chest pain and had a CT with a 7th rib fracture and right-sided trace pleural effusion (see impression below). He had a fall three days ago after drinking alcohol. Sitting in bed is a 10/10. He has abdominal discomfort. He has felt nauseous since getting his IV. He has not broken his ribs before. He was drinking daily. He has gotten shaky in the past after not drinking alcohol. He has never had an alcohol counselor. Last drink was sometime yesterday. He has pain with a deep breath but denies shortness of breath. No headaches. He adds he is done drinking. He denies having resources. He has a PCP. He builds power lines for work. He presents with his girlfriend.     Independent Historian   None    Review of External Notes   I reviewed medical records from: Urgent care office visit from [facility], 7/15/2024 for  similar symptoms and referral to the emergency department     7/15/2024 [facility] [place] CT Chest/Abdomen/Pelvis   IMPRESSION:    1. Minimally displaced left seventh rib fracture. Nondisplaced left seventh rib fracture. Trace right-sided pleural effusion, potentially hemothorax.   2. No other convincing traumatic injury identified in the chest, abdomen, or pelvis.   3. There is however a small dense nodule involving the left seminal vesicle. This is indeterminate, to hemorrhage/hematoma is a possibility given history of trauma. Correlation for hematospermia could be considered. Unclear if this is enhancing on this single phase exam. Most likely, this is intrinsically dense (e.g. hemorrhagic or proteinaceous cyst). If there is clinical concern, nonemergent contrast-enhanced pelvic  MRI could be considered to better assess for enhancing soft tissue nodularity/neoplasm. Please note primary seminal vesicle neoplasia is a relatively rare entity.   4. Diffuse hepatic parenchymal steatosis. Consider PCP referral; dietary/lifestyle modifications may be indicated to prevent chronic disease.     Past Medical History     Medical History and Problem List   Alcohol withdrawal  Anxiety  ADHD  Alcohol-induced acute pancreatitis with uninfected necrosis  Chronic rhinitis  Depression  GERD  Seasonal allergies      Medications   Amitriptyline   Gabapentin   Omeprazole   Sertraline   Trazodone     Physical Exam     Patient Vitals for the past 24 hrs:   BP Temp Temp src Pulse Resp SpO2 Height Weight   07/15/24 1632 (!) 167/114 97.7  F (36.5  C) Temporal 90 18 100 % 1.829 m (6') 83.9 kg (184 lb 15.5 oz)     Physical Exam  Constitutional: Well developed, nontox appearance  Head: Atraumatic.   Neck:  no stridor, no C-spine tenderness  Eyes: no scleral icterus  Cardiovascular: RRR, 2+ bilat radial pulses  Pulmonary/Chest: nml resp effort, Clear and equal BS bilat, reproducible left lateral rib tenderness  Abdominal: ND, soft, NT, no rebound or guarding   Back: No T or L-spine tenderness  Ext: Warm, well perfused, no edema  Neurological: A&O, symmetric facies, moves ext x4  Skin: Skin is warm and dry.   Psychiatric: Behavior is normal. Thought content normal.   Nursing note and vitals reviewed.    Diagnostics     Independent Interpretation   None    ED Course      Medications Administered   Medications   oxyCODONE (ROXICODONE) tablet 10 mg (10 mg Oral $Given 7/15/24 1735)   ondansetron (ZOFRAN ODT) ODT tab 4 mg (4 mg Oral $Given 7/15/24 1735)   acetaminophen (TYLENOL) tablet 1,000 mg (1,000 mg Oral $Given 7/15/24 1735)   ibuprofen (ADVIL/MOTRIN) tablet 800 mg (800 mg Oral $Given 7/15/24 1735)     Discussion of Management   None    ED Course   ED Course as of 07/15/24 1745   Mon Jul 15, 2024   1715 I obtained the  patient's history and examined as noted above.      Optional/Additional Documentation  Social determinants affecting patient's health include: Alcohol abuse increasing risk for presentation to the emergency department    Medical Decision Making / Diagnosis     CMS Diagnoses: None    MIPS       None    MDM   38 year old male presenting w/ left rib pain, CT concerning for trace hemothorax and rib fractures     I reviewed the results of the CT performed in the urgent care.  The patient has displaced rib fractures and possible trace hemothorax without pneumothorax.  He is hemodynamically stable and is not short of breath.  Do not feel further imaging is indicated from the ED.  He likely has mild alcohol dependence.  Prescriptions provided as noted below for pain control.  The patient was provided with an incentive spirometer as well as well as [facility] resources. At this time I feel the pt is safe for discharge.  Recommendations given regarding follow up with PCP and return to the emergency department as needed for new or worsening symptoms.  Pt counseled on all results, disposition and diagnosis.  They are understanding and agreeable to plan. Patient discharged in stable condition.      Disposition   The patient was discharged.     Diagnosis     ICD-10-CM    1. Closed fracture of multiple ribs of left side, initial encounter  S22.42XA            Discharge Medications   New Prescriptions    GABAPENTIN (NEURONTIN) 300 MG CAPSULE    Day 1 - 300 mg every 6 hours Day 2 - 300 mg every 8 hours Day 3 - 300 mg every 12 hours Day 4 - 300 mg one dose    ONDANSETRON (ZOFRAN ODT) 4 MG ODT TAB    Take 1 tablet (4 mg) by mouth every 6 hours as needed for nausea or vomiting    OXYCODONE (ROXICODONE) 5 MG TABLET    Take 1 tablet (5 mg) by mouth every 6 hours as needed for severe pain or breakthrough pain    SENNA-DOCUSATE SODIUM (SENNA S) 8.6-50 MG TABLET    Take 1-2 tablets by mouth 2 times daily as needed (if taking oxycodone)     Scribe  Disclosure:  I, Gregoria Fink, am serving as a scribe at 4:41 PM on 7/15/2024 to document services personally performed by Demario Guzman MD based on my observations and the provider's statements to me.      Demario Guzman MD Vaughn, Christopher E, MD  07/15/24 9857

## 2024-07-15 NOTE — LETTER
July 15, 2024      To Whom It May Concern:      Tello Jones was seen in our Emergency Department today, 07/15/24.  I expect his condition to improve over the next 7 days.  He may return to work when improved.    Sincerely,        Demario Guzman MD

## 2024-07-15 NOTE — ED TRIAGE NOTES
Presents to ED from . Patient had a fall on 7/13 and was seen in the ED at that time but did not have any chest imaging. Patient went to  today for L rib pain. Patient had a CT scan there and was found to have a displaced 7th rib fracture and possible hemothorax. Denies SOB

## 2024-07-15 NOTE — DISCHARGE INSTRUCTIONS
1. -Take acetaminophen 500 to 1000 mg by mouth every 4 to 6 hours as needed for pain or fever.  Do not take more than 4000 mg in 24 hours.  Do not take within 6 hours of another acetaminophen containing medication such as norco (vicodin) or percocet.  - Take ibuprofen 600 to 800 mg by mouth every 6 to 8 hours as needed for pain or fever  2. Take oxycodone as prescribed for pain control.  3. Please take over the counter stool softener while taking opioid pain medication.  4. Use incentive spirometer frequently as instructed.  5. Please follow-up with your primary doctor as needed.  6. Please return to the ED as needed for new or worsening symptoms such as severe and uncontrollable pain, worsening shortness of breath, fever greater than 100.4 F, vomiting and unable to keep anything down, reinjury, any other concerning symptoms.    Discharge Instructions  Chest Injury    You have been seen today because of a chest injury.  You may have contusion (bruise) of the chest or a rib fracture (broken bone).  Rib fractures can be hard to see on x-ray, so we cannot always be sure whether your rib is broken or bruised. Fortunately, the treatment of these injuries is usually the same, and includes pain control and preventing complications.    Generally, every Emergency Department visit should have a follow-up clinic visit with either a primary or a specialty clinic/provider. Please follow-up as instructed by your emergency provider today.    Return to the Emergency Department if:  You become short of breath.  You develop a fever over 101.5 F.  You pass out or become very weak or pale.  You have abdominal (belly) pain that is new or increasing.  You cough up blood.  You have new symptoms or anything that worries you.    Follow-up with your provider:  As directed by your provider today.  If you are not improved in two weeks.  If you need more pain medicine, since we do not refill pain pills through the Emergency Department.    Home  care instructions:  Chest injuries can be painful.  You may take an over-the-counter pain medication such as Tylenol  (acetaminophen), Advil  (ibuprofen), Motrin  (ibuprofen) or Aleve  (naproxen).  Applying ice packs to the painful area can help your pain.   Holding a pillow against your chest can help with pain when you need to move or cough.  You may need to rest and avoid lifting particularly in the first few days after your injury.  Prevention of pneumonia (lung infection) is also a part of managing chest injuries.  Because it can hurt to take deep breaths, you could develop collapsed areas of lung that can develop infection.  To prevent this, you need to take ten very deep breaths every hour while you are awake. Sometimes you will be given a device called an incentive spirometer to help with this. You also need to make yourself cough every hour.  Rib belts or binders are not generally recommended, since they may increase the risk of pneumonia. If you do use one, use it for only short periods of time.   If you were given a prescription for medicine here today, be sure to read all of the information (including the package insert) that comes with your prescription.  This will include important information about the medicine, its side effects, and any warnings that you need to know about.  The pharmacist who fills the prescription can provide more information and answer questions you may have about the medicine.  If you have questions or concerns that the pharmacist cannot address, please call or return to the Emergency Department.   Remember that you can always come back to the Emergency Department if you are not able to see your regular provider in the amount of time listed above, if you get any new symptoms, or if there is anything that worries you.

## 2024-09-11 ENCOUNTER — APPOINTMENT (OUTPATIENT)
Dept: CT IMAGING | Facility: CLINIC | Age: 39
End: 2024-09-11
Attending: EMERGENCY MEDICINE
Payer: COMMERCIAL

## 2024-09-11 ENCOUNTER — HOSPITAL ENCOUNTER (EMERGENCY)
Facility: CLINIC | Age: 39
Discharge: HOME | End: 2024-09-11
Attending: EMERGENCY MEDICINE | Admitting: EMERGENCY MEDICINE
Payer: COMMERCIAL

## 2024-09-11 VITALS
TEMPERATURE: 99 F | WEIGHT: 185 LBS | DIASTOLIC BLOOD PRESSURE: 108 MMHG | HEART RATE: 107 BPM | BODY MASS INDEX: 25.09 KG/M2 | OXYGEN SATURATION: 99 % | RESPIRATION RATE: 20 BRPM | SYSTOLIC BLOOD PRESSURE: 161 MMHG

## 2024-09-11 DIAGNOSIS — E86.0 DEHYDRATION: ICD-10-CM

## 2024-09-11 DIAGNOSIS — R55 SYNCOPE AND COLLAPSE: ICD-10-CM

## 2024-09-11 LAB
ALBUMIN SERPL BCG-MCNC: 4.1 G/DL (ref 3.5–5.2)
ALBUMIN UR-MCNC: 20 MG/DL
ALP SERPL-CCNC: 97 U/L (ref 40–150)
ALT SERPL W P-5'-P-CCNC: 75 U/L (ref 0–70)
ANION GAP SERPL CALCULATED.3IONS-SCNC: 14 MMOL/L (ref 7–15)
APPEARANCE UR: CLEAR
AST SERPL W P-5'-P-CCNC: 165 U/L (ref 0–45)
BASOPHILS # BLD AUTO: 0 10E3/UL (ref 0–0.2)
BASOPHILS NFR BLD AUTO: 0 %
BILIRUB SERPL-MCNC: 1 MG/DL
BILIRUB UR QL STRIP: NEGATIVE
BUN SERPL-MCNC: 4.7 MG/DL (ref 6–20)
CALCIUM SERPL-MCNC: 9.2 MG/DL (ref 8.8–10.4)
CHLORIDE SERPL-SCNC: 96 MMOL/L (ref 98–107)
CK SERPL-CCNC: 217 U/L (ref 39–308)
COLOR UR AUTO: YELLOW
CREAT SERPL-MCNC: 0.66 MG/DL (ref 0.67–1.17)
EGFRCR SERPLBLD CKD-EPI 2021: >90 ML/MIN/1.73M2
EOSINOPHIL # BLD AUTO: 0 10E3/UL (ref 0–0.7)
EOSINOPHIL NFR BLD AUTO: 0 %
ERYTHROCYTE [DISTWIDTH] IN BLOOD BY AUTOMATED COUNT: 12 % (ref 10–15)
GLUCOSE SERPL-MCNC: 112 MG/DL (ref 70–99)
GLUCOSE UR STRIP-MCNC: NEGATIVE MG/DL
HCO3 SERPL-SCNC: 22 MMOL/L (ref 22–29)
HCT VFR BLD AUTO: 41.3 % (ref 40–53)
HGB BLD-MCNC: 14 G/DL (ref 13.3–17.7)
HGB UR QL STRIP: NEGATIVE
HYALINE CASTS: 4 /LPF
IMM GRANULOCYTES # BLD: 0 10E3/UL
IMM GRANULOCYTES NFR BLD: 0 %
INR PPP: 1.16 (ref 0.85–1.15)
KETONES UR STRIP-MCNC: NEGATIVE MG/DL
LEUKOCYTE ESTERASE UR QL STRIP: NEGATIVE
LYMPHOCYTES # BLD AUTO: 1.1 10E3/UL (ref 0.8–5.3)
LYMPHOCYTES NFR BLD AUTO: 12 %
MCH RBC QN AUTO: 33.7 PG (ref 26.5–33)
MCHC RBC AUTO-ENTMCNC: 33.9 G/DL (ref 31.5–36.5)
MCV RBC AUTO: 100 FL (ref 78–100)
MONOCYTES # BLD AUTO: 0.7 10E3/UL (ref 0–1.3)
MONOCYTES NFR BLD AUTO: 8 %
MUCOUS THREADS #/AREA URNS LPF: PRESENT /LPF
NEUTROPHILS # BLD AUTO: 7.1 10E3/UL (ref 1.6–8.3)
NEUTROPHILS NFR BLD AUTO: 80 %
NITRATE UR QL: NEGATIVE
NRBC # BLD AUTO: 0 10E3/UL
NRBC BLD AUTO-RTO: 0 /100
PH UR STRIP: 7.5 [PH] (ref 5–7)
PLATELET # BLD AUTO: 241 10E3/UL (ref 150–450)
POTASSIUM SERPL-SCNC: 3.3 MMOL/L (ref 3.4–5.3)
PROT SERPL-MCNC: 8.3 G/DL (ref 6.4–8.3)
RBC # BLD AUTO: 4.15 10E6/UL (ref 4.4–5.9)
RBC URINE: 2 /HPF
SODIUM SERPL-SCNC: 132 MMOL/L (ref 135–145)
SP GR UR STRIP: 1.02 (ref 1–1.03)
UROBILINOGEN UR STRIP-MCNC: 3 MG/DL
WBC # BLD AUTO: 8.9 10E3/UL (ref 4–11)
WBC URINE: 1 /HPF

## 2024-09-11 PROCEDURE — 85025 COMPLETE CBC W/AUTO DIFF WBC: CPT | Performed by: EMERGENCY MEDICINE

## 2024-09-11 PROCEDURE — 96360 HYDRATION IV INFUSION INIT: CPT

## 2024-09-11 PROCEDURE — 99285 EMERGENCY DEPT VISIT HI MDM: CPT | Mod: 25

## 2024-09-11 PROCEDURE — 82550 ASSAY OF CK (CPK): CPT | Performed by: EMERGENCY MEDICINE

## 2024-09-11 PROCEDURE — 85610 PROTHROMBIN TIME: CPT | Performed by: EMERGENCY MEDICINE

## 2024-09-11 PROCEDURE — 70450 CT HEAD/BRAIN W/O DYE: CPT

## 2024-09-11 PROCEDURE — 80053 COMPREHEN METABOLIC PANEL: CPT | Performed by: EMERGENCY MEDICINE

## 2024-09-11 PROCEDURE — 82040 ASSAY OF SERUM ALBUMIN: CPT | Performed by: EMERGENCY MEDICINE

## 2024-09-11 PROCEDURE — 81001 URINALYSIS AUTO W/SCOPE: CPT | Performed by: EMERGENCY MEDICINE

## 2024-09-11 PROCEDURE — 93005 ELECTROCARDIOGRAM TRACING: CPT

## 2024-09-11 PROCEDURE — 36415 COLL VENOUS BLD VENIPUNCTURE: CPT | Performed by: EMERGENCY MEDICINE

## 2024-09-11 PROCEDURE — 96361 HYDRATE IV INFUSION ADD-ON: CPT

## 2024-09-11 PROCEDURE — 258N000003 HC RX IP 258 OP 636: Performed by: EMERGENCY MEDICINE

## 2024-09-11 PROCEDURE — 250N000011 HC RX IP 250 OP 636: Performed by: EMERGENCY MEDICINE

## 2024-09-11 RX ORDER — ONDANSETRON 2 MG/ML
4 INJECTION INTRAMUSCULAR; INTRAVENOUS EVERY 30 MIN PRN
Status: DISCONTINUED | OUTPATIENT
Start: 2024-09-11 | End: 2024-09-12 | Stop reason: HOSPADM

## 2024-09-11 RX ORDER — ONDANSETRON 4 MG/1
4 TABLET, ORALLY DISINTEGRATING ORAL EVERY 8 HOURS PRN
Qty: 10 TABLET | Refills: 0 | Status: SHIPPED | OUTPATIENT
Start: 2024-09-11 | End: 2024-09-14

## 2024-09-11 RX ORDER — ONDANSETRON 4 MG/1
4 TABLET, ORALLY DISINTEGRATING ORAL ONCE
Status: COMPLETED | OUTPATIENT
Start: 2024-09-11 | End: 2024-09-11

## 2024-09-11 RX ADMIN — ONDANSETRON 4 MG: 4 TABLET, ORALLY DISINTEGRATING ORAL at 18:13

## 2024-09-11 RX ADMIN — SODIUM CHLORIDE 1000 ML: 9 INJECTION, SOLUTION INTRAVENOUS at 20:36

## 2024-09-11 ASSESSMENT — ACTIVITIES OF DAILY LIVING (ADL)
ADLS_ACUITY_SCORE: 35
ADLS_ACUITY_SCORE: 37
ADLS_ACUITY_SCORE: 37

## 2024-09-11 NOTE — ED TRIAGE NOTES
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 Pt unsure of what led to episode. Working outside in sun all day, hasn't eaten much. Hypertensive, all other VSS. C/o headache and abdominal pain now.      Triage Assessment (Adult)       Row Name 09/11/24 5485          Triage Assessment    Airway WDL WDL        Respiratory WDL    Respiratory WDL WDL        Skin Circulation/Temperature WDL    Skin Circulation/Temperature WDL WDL        Cardiac WDL    Cardiac WDL WDL        Peripheral/Neurovascular WDL    Peripheral Neurovascular WDL WDL        Cognitive/Neuro/Behavioral WDL    Cognitive/Neuro/Behavioral WDL WDL

## 2024-09-11 NOTE — Clinical Note
Tello Jones was seen and treated in our emergency department on 9/11/2024.  He may return to work on 09/16/2024.       If you have any questions or concerns, please don't hesitate to call.      Rashmi Toledo MD

## 2024-09-12 LAB
ATRIAL RATE - MUSE: 102 BPM
DIASTOLIC BLOOD PRESSURE - MUSE: NORMAL MMHG
INTERPRETATION ECG - MUSE: NORMAL
P AXIS - MUSE: 25 DEGREES
PR INTERVAL - MUSE: 178 MS
QRS DURATION - MUSE: 82 MS
QT - MUSE: 370 MS
QTC - MUSE: 482 MS
R AXIS - MUSE: 27 DEGREES
SYSTOLIC BLOOD PRESSURE - MUSE: NORMAL MMHG
T AXIS - MUSE: 17 DEGREES
VENTRICULAR RATE- MUSE: 102 BPM

## 2024-09-12 NOTE — ED PROVIDER NOTES
"  Emergency Department Note      History of Present Illness     Chief Complaint   Syncope      HPI   Tello Jones is a 38 year old male who presents to the ED via EMS with syncope. 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 He reports he did not his his head and was not sure how long the episode lasted. He states he felt fine going to work but felt \"overly gassy\" today. He denies trauma or pain to any areas. He states he did not eat or drink anything today and had some water upon arrival to ED. He endorses nausea and bilateral calf pain. He denies alcohol or drug use recently, cough, fever, or exposure to illness. The patient's wife states he does not have any medical problems and he takes amityiplyine, omeprazole, sertaline, trazodone    Independent Historian   Wife as detailed above.    Review of External Notes   none    Past Medical History     Medical History and Problem List   ADHD  Anxiety  Depression  GERD  Insomnia, idiopathic  Seasonal allergies  Alcohol-induced acute pancreatitis  Alcohol withdrawal    Medications   Amitriptyline   Omeprazole   Ondansetron   Sertraline   Trazodone    Physical Exam     Patient Vitals for the past 24 hrs:   BP Temp Temp src Pulse Resp SpO2 Weight   09/11/24 1810 (!) 161/108 -- -- -- -- -- --   09/11/24 1805 -- 99  F (37.2  C) Oral 107 20 99 % 83.9 kg (185 lb)     Physical Exam  GEN- alert, cooperative  HEENT- atraumatic, PERRL, EOMI, MMM, oral pharynx without abnormalities, no dental injuries, midface stable, TM's clear bilaterally  NECK- ROM, soft, supple, no midline C spine tenderness to palpation  RESP- CTAB, no w/r/r, chest wall nontender  CV- RRR, no m/r/g  ABD- soft, NT/ND, +BS  MSK- normal ROM in all extremities, pelvis stable to AP and lateral compression, no T and L spinal tenderness in the midline, 5/5 strength in all extremities  NEURO- GCS 15, speech normal, alert,  " strong bilaterally  SKIN- no rash, no bruising, no ecchymosis  PSYCH- normal mood, normal behavior      Diagnostics     Lab Results   Labs Ordered and Resulted from Time of ED Arrival to Time of ED Departure   COMPREHENSIVE METABOLIC PANEL - Abnormal       Result Value    Sodium 132 (*)     Potassium 3.3 (*)     Carbon Dioxide (CO2) 22      Anion Gap 14      Urea Nitrogen 4.7 (*)     Creatinine 0.66 (*)     GFR Estimate >90      Calcium 9.2      Chloride 96 (*)     Glucose 112 (*)     Alkaline Phosphatase 97       (*)     ALT 75 (*)     Protein Total 8.3      Albumin 4.1      Bilirubin Total 1.0     ROUTINE UA WITH MICROSCOPIC REFLEX TO CULTURE - Abnormal    Color Urine Yellow      Appearance Urine Clear      Glucose Urine Negative      Bilirubin Urine Negative      Ketones Urine Negative      Specific Gravity Urine 1.021      Blood Urine Negative      pH Urine 7.5 (*)     Protein Albumin Urine 20 (*)     Urobilinogen Urine 3.0 (*)     Nitrite Urine Negative      Leukocyte Esterase Urine Negative      Mucus Urine Present (*)     RBC Urine 2      WBC Urine 1      Hyaline Casts Urine 4 (*)    CBC WITH PLATELETS AND DIFFERENTIAL - Abnormal    WBC Count 8.9      RBC Count 4.15 (*)     Hemoglobin 14.0      Hematocrit 41.3            MCH 33.7 (*)     MCHC 33.9      RDW 12.0      Platelet Count 241      % Neutrophils 80      % Lymphocytes 12      % Monocytes 8      % Eosinophils 0      % Basophils 0      % Immature Granulocytes 0      NRBCs per 100 WBC 0      Absolute Neutrophils 7.1      Absolute Lymphocytes 1.1      Absolute Monocytes 0.7      Absolute Eosinophils 0.0      Absolute Basophils 0.0      Absolute Immature Granulocytes 0.0      Absolute NRBCs 0.0     INR - Abnormal    INR 1.16 (*)    CK TOTAL - Normal             Imaging   CT Head w/o Contrast   Final Result   IMPRESSION:   1.  No CT finding of a mass, hemorrhage or focal area suggestive of acute infarct.          EKG   ECG taken at 1745,  ECG read at 2000  Sinus tachycardia  Otherwise normal ECG   Rate 102 bpm. ID interval 178 ms. QRS duration 82 ms. QT/QTc 370/482 ms. P-R-T axes 25 27 17.    Independent Interpretation   none    ED Course      Medications Administered   Medications   ondansetron (ZOFRAN ODT) ODT tab 4 mg (4 mg Oral $Given 9/11/24 1813)   sodium chloride 0.9% BOLUS 1,000 mL (0 mLs Intravenous Stopped 9/11/24 2245)     Discussion of Management   None    ED Course   ED Course as of 09/11/24 2246   Wed Sep 11, 2024   2012 I obtained history and examined the patient as noted above.   2238 I rechecked the patient and explained findings. Patient is feeling better.       Additional Documentation  None    Medical Decision Making / Diagnosis     CMS Diagnoses: None    MIPS   None    MDM   Tello Jones is a 38 year old male who presents with a syncopal episode while at work.  Patient was working outside with electrical poles and states that he has not had anything to eat or drink today.  He was awake and alert on our exam.  Neurologically intact.  He was given IV fluids and felt better.  He does have a history of alcohol abuse and LFTs are elevated likely due to that.  We did discuss cessation of alcohol and he will follow-up with his doctor on his liver enzymes.  He passed a p.o. challenge and felt better.  He was given a prescription of Zofran for nausea as needed.  We discussed resting for the next 2 days and he was given a work note.  Return precaution provided.  Patient discharged in improved condition.    Disposition   The patient was discharged.     Diagnosis     ICD-10-CM    1. Dehydration  E86.0       2. Syncope and collapse  R55            Discharge Medications   Discharge Medication List as of 9/11/2024 10:45 PM            Scribe Disclosure:  I, Gonzalez Adrian, am serving as a scribe at 9:29 PM on 9/11/2024 to document services personally performed by Rashmi Toledo MD based on my observations and the provider's statements to me.         Rashmi Toledo MD  09/12/24 0137

## 2024-12-04 ENCOUNTER — APPOINTMENT (OUTPATIENT)
Dept: CT IMAGING | Facility: CLINIC | Age: 39
DRG: 897 | End: 2024-12-04
Attending: EMERGENCY MEDICINE
Payer: COMMERCIAL

## 2024-12-04 ENCOUNTER — HOSPITAL ENCOUNTER (INPATIENT)
Facility: CLINIC | Age: 39
DRG: 897 | End: 2024-12-04
Attending: EMERGENCY MEDICINE | Admitting: SURGERY
Payer: COMMERCIAL

## 2024-12-04 DIAGNOSIS — F10.239 ALCOHOL DEPENDENCE WITH WITHDRAWAL WITH COMPLICATION (H): Primary | ICD-10-CM

## 2024-12-04 DIAGNOSIS — R56.9 ALCOHOL WITHDRAWAL SEIZURE WITH COMPLICATION (H): ICD-10-CM

## 2024-12-04 DIAGNOSIS — S36.039A LACERATION OF SPLEEN, INITIAL ENCOUNTER: ICD-10-CM

## 2024-12-04 DIAGNOSIS — S32.009A CLOSED FRACTURE OF TRANSVERSE PROCESS OF LUMBAR VERTEBRA, INITIAL ENCOUNTER (H): ICD-10-CM

## 2024-12-04 DIAGNOSIS — F10.939 ALCOHOL WITHDRAWAL SEIZURE WITH COMPLICATION (H): ICD-10-CM

## 2024-12-04 LAB
ALBUMIN SERPL BCG-MCNC: 4.4 G/DL (ref 3.5–5.2)
ALP SERPL-CCNC: 84 U/L (ref 40–150)
ALT SERPL W P-5'-P-CCNC: 104 U/L (ref 0–70)
ANION GAP SERPL CALCULATED.3IONS-SCNC: 19 MMOL/L (ref 7–15)
AST SERPL W P-5'-P-CCNC: 232 U/L (ref 0–45)
BASOPHILS # BLD AUTO: 0 10E3/UL (ref 0–0.2)
BASOPHILS NFR BLD AUTO: 0 %
BILIRUB SERPL-MCNC: 0.6 MG/DL
BUN SERPL-MCNC: 6.8 MG/DL (ref 6–20)
CALCIUM SERPL-MCNC: 9 MG/DL (ref 8.8–10.4)
CHLORIDE SERPL-SCNC: 98 MMOL/L (ref 98–107)
CREAT SERPL-MCNC: 0.73 MG/DL (ref 0.67–1.17)
EGFRCR SERPLBLD CKD-EPI 2021: >90 ML/MIN/1.73M2
EOSINOPHIL # BLD AUTO: 0.2 10E3/UL (ref 0–0.7)
EOSINOPHIL NFR BLD AUTO: 3 %
ERYTHROCYTE [DISTWIDTH] IN BLOOD BY AUTOMATED COUNT: 12.4 % (ref 10–15)
ETHANOL SERPL-MCNC: <0.01 G/DL
GLUCOSE SERPL-MCNC: 102 MG/DL (ref 70–99)
HCO3 SERPL-SCNC: 18 MMOL/L (ref 22–29)
HCT VFR BLD AUTO: 41.1 % (ref 40–53)
HGB BLD-MCNC: 12.8 G/DL (ref 13.3–17.7)
HGB BLD-MCNC: 13.5 G/DL (ref 13.3–17.7)
HGB BLD-MCNC: 13.8 G/DL (ref 13.3–17.7)
HOLD SPECIMEN: NORMAL
IMM GRANULOCYTES # BLD: 0 10E3/UL
IMM GRANULOCYTES NFR BLD: 0 %
INR PPP: 1.07 (ref 0.85–1.15)
LYMPHOCYTES # BLD AUTO: 2.3 10E3/UL (ref 0.8–5.3)
LYMPHOCYTES NFR BLD AUTO: 33 %
MAGNESIUM SERPL-MCNC: 1.7 MG/DL (ref 1.7–2.3)
MAGNESIUM SERPL-MCNC: 1.8 MG/DL (ref 1.7–2.3)
MCH RBC QN AUTO: 33.8 PG (ref 26.5–33)
MCHC RBC AUTO-ENTMCNC: 33.6 G/DL (ref 31.5–36.5)
MCV RBC AUTO: 101 FL (ref 78–100)
MONOCYTES # BLD AUTO: 0.7 10E3/UL (ref 0–1.3)
MONOCYTES NFR BLD AUTO: 10 %
NEUTROPHILS # BLD AUTO: 3.7 10E3/UL (ref 1.6–8.3)
NEUTROPHILS NFR BLD AUTO: 54 %
NRBC # BLD AUTO: 0 10E3/UL
NRBC BLD AUTO-RTO: 0 /100
PHOSPHATE SERPL-MCNC: 3.6 MG/DL (ref 2.5–4.5)
PLATELET # BLD AUTO: 181 10E3/UL (ref 150–450)
POTASSIUM SERPL-SCNC: 3.6 MMOL/L (ref 3.4–5.3)
PROT SERPL-MCNC: 7.9 G/DL (ref 6.4–8.3)
RBC # BLD AUTO: 4.08 10E6/UL (ref 4.4–5.9)
SODIUM SERPL-SCNC: 135 MMOL/L (ref 135–145)
WBC # BLD AUTO: 6.8 10E3/UL (ref 4–11)

## 2024-12-04 PROCEDURE — 99285 EMERGENCY DEPT VISIT HI MDM: CPT | Mod: 25

## 2024-12-04 PROCEDURE — 250N000011 HC RX IP 250 OP 636: Performed by: SURGERY

## 2024-12-04 PROCEDURE — 80053 COMPREHEN METABOLIC PANEL: CPT | Performed by: EMERGENCY MEDICINE

## 2024-12-04 PROCEDURE — 74177 CT ABD & PELVIS W/CONTRAST: CPT

## 2024-12-04 PROCEDURE — 85025 COMPLETE CBC W/AUTO DIFF WBC: CPT | Performed by: EMERGENCY MEDICINE

## 2024-12-04 PROCEDURE — 82310 ASSAY OF CALCIUM: CPT | Performed by: EMERGENCY MEDICINE

## 2024-12-04 PROCEDURE — 70450 CT HEAD/BRAIN W/O DYE: CPT

## 2024-12-04 PROCEDURE — 96374 THER/PROPH/DIAG INJ IV PUSH: CPT | Mod: 59

## 2024-12-04 PROCEDURE — 36415 COLL VENOUS BLD VENIPUNCTURE: CPT | Performed by: EMERGENCY MEDICINE

## 2024-12-04 PROCEDURE — 250N000011 HC RX IP 250 OP 636: Performed by: EMERGENCY MEDICINE

## 2024-12-04 PROCEDURE — 250N000009 HC RX 250: Performed by: EMERGENCY MEDICINE

## 2024-12-04 PROCEDURE — 96375 TX/PRO/DX INJ NEW DRUG ADDON: CPT

## 2024-12-04 PROCEDURE — 85018 HEMOGLOBIN: CPT | Performed by: SURGERY

## 2024-12-04 PROCEDURE — 84100 ASSAY OF PHOSPHORUS: CPT | Performed by: INTERNAL MEDICINE

## 2024-12-04 PROCEDURE — 83735 ASSAY OF MAGNESIUM: CPT | Performed by: INTERNAL MEDICINE

## 2024-12-04 PROCEDURE — 82077 ASSAY SPEC XCP UR&BREATH IA: CPT | Performed by: EMERGENCY MEDICINE

## 2024-12-04 PROCEDURE — 71260 CT THORAX DX C+: CPT

## 2024-12-04 PROCEDURE — 82040 ASSAY OF SERUM ALBUMIN: CPT | Performed by: EMERGENCY MEDICINE

## 2024-12-04 PROCEDURE — 85610 PROTHROMBIN TIME: CPT | Performed by: EMERGENCY MEDICINE

## 2024-12-04 PROCEDURE — 36415 COLL VENOUS BLD VENIPUNCTURE: CPT | Performed by: SURGERY

## 2024-12-04 PROCEDURE — HZ2ZZZZ DETOXIFICATION SERVICES FOR SUBSTANCE ABUSE TREATMENT: ICD-10-PCS | Performed by: INTERNAL MEDICINE

## 2024-12-04 PROCEDURE — 99223 1ST HOSP IP/OBS HIGH 75: CPT | Performed by: INTERNAL MEDICINE

## 2024-12-04 PROCEDURE — 258N000003 HC RX IP 258 OP 636: Performed by: INTERNAL MEDICINE

## 2024-12-04 PROCEDURE — 250N000013 HC RX MED GY IP 250 OP 250 PS 637: Performed by: INTERNAL MEDICINE

## 2024-12-04 PROCEDURE — 120N000001 HC R&B MED SURG/OB

## 2024-12-04 PROCEDURE — 83735 ASSAY OF MAGNESIUM: CPT | Performed by: EMERGENCY MEDICINE

## 2024-12-04 PROCEDURE — 36415 COLL VENOUS BLD VENIPUNCTURE: CPT | Performed by: INTERNAL MEDICINE

## 2024-12-04 PROCEDURE — 250N000013 HC RX MED GY IP 250 OP 250 PS 637: Performed by: SURGERY

## 2024-12-04 RX ORDER — CLONIDINE HYDROCHLORIDE 0.1 MG/1
0.1 TABLET ORAL EVERY 8 HOURS
Status: DISCONTINUED | OUTPATIENT
Start: 2024-12-04 | End: 2024-12-07 | Stop reason: HOSPADM

## 2024-12-04 RX ORDER — GABAPENTIN 300 MG/1
300 CAPSULE ORAL EVERY 8 HOURS
Status: DISCONTINUED | OUTPATIENT
Start: 2024-12-10 | End: 2024-12-07 | Stop reason: HOSPADM

## 2024-12-04 RX ORDER — FOLIC ACID 1 MG/1
1 TABLET ORAL DAILY
Status: DISCONTINUED | OUTPATIENT
Start: 2024-12-04 | End: 2024-12-07 | Stop reason: HOSPADM

## 2024-12-04 RX ORDER — LIDOCAINE 40 MG/G
CREAM TOPICAL
Status: DISCONTINUED | OUTPATIENT
Start: 2024-12-04 | End: 2024-12-07 | Stop reason: HOSPADM

## 2024-12-04 RX ORDER — HYDROMORPHONE HCL IN WATER/PF 6 MG/30 ML
0.4 PATIENT CONTROLLED ANALGESIA SYRINGE INTRAVENOUS
Status: DISCONTINUED | OUTPATIENT
Start: 2024-12-04 | End: 2024-12-06

## 2024-12-04 RX ORDER — TRAZODONE HYDROCHLORIDE 100 MG/1
200 TABLET ORAL AT BEDTIME
Status: DISCONTINUED | OUTPATIENT
Start: 2024-12-04 | End: 2024-12-07 | Stop reason: HOSPADM

## 2024-12-04 RX ORDER — AMOXICILLIN 250 MG
2 CAPSULE ORAL 2 TIMES DAILY PRN
Status: DISCONTINUED | OUTPATIENT
Start: 2024-12-04 | End: 2024-12-07 | Stop reason: HOSPADM

## 2024-12-04 RX ORDER — ONDANSETRON 2 MG/ML
4 INJECTION INTRAMUSCULAR; INTRAVENOUS EVERY 6 HOURS PRN
Status: DISCONTINUED | OUTPATIENT
Start: 2024-12-04 | End: 2024-12-07 | Stop reason: HOSPADM

## 2024-12-04 RX ORDER — GABAPENTIN 300 MG/1
900 CAPSULE ORAL EVERY 8 HOURS
Status: DISCONTINUED | OUTPATIENT
Start: 2024-12-05 | End: 2024-12-07 | Stop reason: HOSPADM

## 2024-12-04 RX ORDER — LORAZEPAM 1 MG/1
1-2 TABLET ORAL EVERY 30 MIN PRN
Status: DISCONTINUED | OUTPATIENT
Start: 2024-12-04 | End: 2024-12-07 | Stop reason: HOSPADM

## 2024-12-04 RX ORDER — ONDANSETRON 4 MG/1
4 TABLET, ORALLY DISINTEGRATING ORAL EVERY 6 HOURS PRN
Status: DISCONTINUED | OUTPATIENT
Start: 2024-12-04 | End: 2024-12-07 | Stop reason: HOSPADM

## 2024-12-04 RX ORDER — PANTOPRAZOLE SODIUM 40 MG/1
40 TABLET, DELAYED RELEASE ORAL DAILY
Status: DISCONTINUED | OUTPATIENT
Start: 2024-12-05 | End: 2024-12-07 | Stop reason: HOSPADM

## 2024-12-04 RX ORDER — ACETAMINOPHEN 325 MG/1
650 TABLET ORAL EVERY 4 HOURS PRN
Status: DISCONTINUED | OUTPATIENT
Start: 2024-12-04 | End: 2024-12-06

## 2024-12-04 RX ORDER — GABAPENTIN 100 MG/1
100 CAPSULE ORAL EVERY 8 HOURS
Status: DISCONTINUED | OUTPATIENT
Start: 2024-12-12 | End: 2024-12-07 | Stop reason: HOSPADM

## 2024-12-04 RX ORDER — PANTOPRAZOLE SODIUM 40 MG/1
40 TABLET, DELAYED RELEASE ORAL DAILY
COMMUNITY
Start: 2024-10-31

## 2024-12-04 RX ORDER — FLUMAZENIL 0.1 MG/ML
0.2 INJECTION, SOLUTION INTRAVENOUS
Status: DISCONTINUED | OUTPATIENT
Start: 2024-12-04 | End: 2024-12-07 | Stop reason: HOSPADM

## 2024-12-04 RX ORDER — CALCIUM CARBONATE 500 MG/1
1000 TABLET, CHEWABLE ORAL 4 TIMES DAILY PRN
Status: DISCONTINUED | OUTPATIENT
Start: 2024-12-04 | End: 2024-12-07 | Stop reason: HOSPADM

## 2024-12-04 RX ORDER — GABAPENTIN 600 MG/1
1200 TABLET ORAL ONCE
Status: COMPLETED | OUTPATIENT
Start: 2024-12-04 | End: 2024-12-04

## 2024-12-04 RX ORDER — HALOPERIDOL 5 MG/ML
2.5-5 INJECTION INTRAMUSCULAR EVERY 6 HOURS PRN
Status: DISCONTINUED | OUTPATIENT
Start: 2024-12-04 | End: 2024-12-07 | Stop reason: HOSPADM

## 2024-12-04 RX ORDER — ACETAMINOPHEN 650 MG/1
650 SUPPOSITORY RECTAL EVERY 4 HOURS PRN
Status: DISCONTINUED | OUTPATIENT
Start: 2024-12-04 | End: 2024-12-06

## 2024-12-04 RX ORDER — GABAPENTIN 300 MG/1
600 CAPSULE ORAL EVERY 8 HOURS
Status: DISCONTINUED | OUTPATIENT
Start: 2024-12-08 | End: 2024-12-07 | Stop reason: HOSPADM

## 2024-12-04 RX ORDER — AMITRIPTYLINE HYDROCHLORIDE 50 MG/1
50 TABLET ORAL AT BEDTIME
COMMUNITY

## 2024-12-04 RX ORDER — HYDROMORPHONE HCL IN WATER/PF 6 MG/30 ML
0.2 PATIENT CONTROLLED ANALGESIA SYRINGE INTRAVENOUS
Status: DISCONTINUED | OUTPATIENT
Start: 2024-12-04 | End: 2024-12-06

## 2024-12-04 RX ORDER — LORAZEPAM 2 MG/ML
1-2 INJECTION INTRAMUSCULAR EVERY 30 MIN PRN
Status: DISCONTINUED | OUTPATIENT
Start: 2024-12-04 | End: 2024-12-07 | Stop reason: HOSPADM

## 2024-12-04 RX ORDER — OXYCODONE HYDROCHLORIDE 5 MG/1
5 TABLET ORAL EVERY 4 HOURS PRN
Status: DISCONTINUED | OUTPATIENT
Start: 2024-12-04 | End: 2024-12-05

## 2024-12-04 RX ORDER — HYDROMORPHONE HYDROCHLORIDE 1 MG/ML
0.5 INJECTION, SOLUTION INTRAMUSCULAR; INTRAVENOUS; SUBCUTANEOUS ONCE
Status: COMPLETED | OUTPATIENT
Start: 2024-12-04 | End: 2024-12-04

## 2024-12-04 RX ORDER — MULTIPLE VITAMINS W/ MINERALS TAB 9MG-400MCG
1 TAB ORAL DAILY
Status: DISCONTINUED | OUTPATIENT
Start: 2024-12-04 | End: 2024-12-07 | Stop reason: HOSPADM

## 2024-12-04 RX ORDER — OLANZAPINE 5 MG/1
5-10 TABLET, ORALLY DISINTEGRATING ORAL EVERY 6 HOURS PRN
Status: DISCONTINUED | OUTPATIENT
Start: 2024-12-04 | End: 2024-12-07 | Stop reason: HOSPADM

## 2024-12-04 RX ORDER — POLYETHYLENE GLYCOL 3350 17 G/17G
17 POWDER, FOR SOLUTION ORAL 2 TIMES DAILY PRN
Status: DISCONTINUED | OUTPATIENT
Start: 2024-12-04 | End: 2024-12-07 | Stop reason: HOSPADM

## 2024-12-04 RX ORDER — AMOXICILLIN 250 MG
1 CAPSULE ORAL 2 TIMES DAILY PRN
Status: DISCONTINUED | OUTPATIENT
Start: 2024-12-04 | End: 2024-12-07 | Stop reason: HOSPADM

## 2024-12-04 RX ORDER — SODIUM CHLORIDE 9 MG/ML
INJECTION, SOLUTION INTRAVENOUS CONTINUOUS
Status: ACTIVE | OUTPATIENT
Start: 2024-12-04 | End: 2024-12-05

## 2024-12-04 RX ORDER — LORAZEPAM 2 MG/ML
2 INJECTION INTRAMUSCULAR ONCE
Status: COMPLETED | OUTPATIENT
Start: 2024-12-04 | End: 2024-12-04

## 2024-12-04 RX ORDER — IOPAMIDOL 755 MG/ML
500 INJECTION, SOLUTION INTRAVASCULAR ONCE
Status: COMPLETED | OUTPATIENT
Start: 2024-12-04 | End: 2024-12-04

## 2024-12-04 RX ADMIN — GABAPENTIN 1200 MG: 600 TABLET, FILM COATED ORAL at 16:20

## 2024-12-04 RX ADMIN — SENNOSIDES AND DOCUSATE SODIUM 1 TABLET: 50; 8.6 TABLET ORAL at 23:45

## 2024-12-04 RX ADMIN — SODIUM CHLORIDE 63 ML: 9 INJECTION, SOLUTION INTRAVENOUS at 11:43

## 2024-12-04 RX ADMIN — IOPAMIDOL 93 ML: 755 INJECTION, SOLUTION INTRAVENOUS at 11:43

## 2024-12-04 RX ADMIN — HYDROMORPHONE HYDROCHLORIDE 0.4 MG: 0.2 INJECTION, SOLUTION INTRAMUSCULAR; INTRAVENOUS; SUBCUTANEOUS at 23:42

## 2024-12-04 RX ADMIN — OXYCODONE HYDROCHLORIDE 5 MG: 5 TABLET ORAL at 16:28

## 2024-12-04 RX ADMIN — CLONIDINE HYDROCHLORIDE 0.1 MG: 0.1 TABLET ORAL at 16:20

## 2024-12-04 RX ADMIN — THIAMINE HCL TAB 100 MG 100 MG: 100 TAB at 16:20

## 2024-12-04 RX ADMIN — GABAPENTIN 900 MG: 300 CAPSULE ORAL at 23:35

## 2024-12-04 RX ADMIN — AMITRIPTYLINE HYDROCHLORIDE 50 MG: 50 TABLET, FILM COATED ORAL at 23:35

## 2024-12-04 RX ADMIN — HYDROMORPHONE HYDROCHLORIDE 0.5 MG: 1 INJECTION, SOLUTION INTRAMUSCULAR; INTRAVENOUS; SUBCUTANEOUS at 15:00

## 2024-12-04 RX ADMIN — TRAZODONE HYDROCHLORIDE 200 MG: 100 TABLET ORAL at 21:11

## 2024-12-04 RX ADMIN — Medication 1 TABLET: at 16:20

## 2024-12-04 RX ADMIN — OXYCODONE HYDROCHLORIDE 5 MG: 5 TABLET ORAL at 21:11

## 2024-12-04 RX ADMIN — LORAZEPAM 2 MG: 2 INJECTION INTRAMUSCULAR; INTRAVENOUS at 11:23

## 2024-12-04 RX ADMIN — HYDROMORPHONE HYDROCHLORIDE 0.5 MG: 1 INJECTION, SOLUTION INTRAMUSCULAR; INTRAVENOUS; SUBCUTANEOUS at 13:30

## 2024-12-04 RX ADMIN — SODIUM CHLORIDE: 9 INJECTION, SOLUTION INTRAVENOUS at 23:49

## 2024-12-04 RX ADMIN — FOLIC ACID 1 MG: 1 TABLET ORAL at 16:20

## 2024-12-04 RX ADMIN — HYDROMORPHONE HYDROCHLORIDE 0.4 MG: 0.2 INJECTION, SOLUTION INTRAMUSCULAR; INTRAVENOUS; SUBCUTANEOUS at 18:02

## 2024-12-04 RX ADMIN — SODIUM CHLORIDE: 9 INJECTION, SOLUTION INTRAVENOUS at 16:30

## 2024-12-04 RX ADMIN — CLONIDINE HYDROCHLORIDE 0.1 MG: 0.1 TABLET ORAL at 23:37

## 2024-12-04 ASSESSMENT — ACTIVITIES OF DAILY LIVING (ADL)
ADLS_ACUITY_SCORE: 26
ADLS_ACUITY_SCORE: 48
ADLS_ACUITY_SCORE: 26
ADLS_ACUITY_SCORE: 48
ADLS_ACUITY_SCORE: 26
ADLS_ACUITY_SCORE: 48
ADLS_ACUITY_SCORE: 26
ADLS_ACUITY_SCORE: 26
ADLS_ACUITY_SCORE: 48

## 2024-12-04 ASSESSMENT — COLUMBIA-SUICIDE SEVERITY RATING SCALE - C-SSRS
1. IN THE PAST MONTH, HAVE YOU WISHED YOU WERE DEAD OR WISHED YOU COULD GO TO SLEEP AND NOT WAKE UP?: NO
6. HAVE YOU EVER DONE ANYTHING, STARTED TO DO ANYTHING, OR PREPARED TO DO ANYTHING TO END YOUR LIFE?: NO
2. HAVE YOU ACTUALLY HAD ANY THOUGHTS OF KILLING YOURSELF IN THE PAST MONTH?: NO

## 2024-12-04 NOTE — ED PROVIDER NOTES
Emergency Department Note      History of Present Illness     Chief Complaint   Seizures      HPI   Tello Jones is a 39 year old male who presents following seizure at work.  Per report, patient's coworkers witnessed an approximate 3-minute seizure.  Patient now awake and oriented and able to provide his own history.  He denies history of seizure.  He states he bit his lip.  He initially denies alcohol use, but with more questioning does admit to frequent use and states he has been trying to cut back.  He is reporting left-sided flank pain.  Denies difficulty breathing.  Denies headache.  Denies fevers.     Independent Historian:  none    Review of External Notes   Reviewed previous admission from October 2021 when patient was admitted with alcohol induced pancreatitis and alcohol withdrawal syndrome.    Past Medical History     Medical History and Problem List   No past medical history on file.    Medications   amitriptyline (ELAVIL) 50 MG tablet  cetirizine (ZYRTEC) 10 MG tablet  multivitamin (CENTRUM SILVER) tablet  pantoprazole (PROTONIX) 40 MG EC tablet  sertraline (ZOLOFT) 100 MG tablet  traZODone (DESYREL) 100 MG tablet        Surgical History   No past surgical history on file.    Physical Exam     Patient Vitals for the past 24 hrs:   BP Temp Temp src Pulse Resp SpO2 Weight   12/04/24 1602 -- 98.2  F (36.8  C) Oral -- 18 -- --   12/04/24 1500 108/67 -- -- 70 -- 100 % --   12/04/24 1404 123/81 -- -- -- -- 95 % --   12/04/24 1349 (!) 115/91 -- -- -- -- 99 % --   12/04/24 1334 128/87 -- -- -- -- 97 % --   12/04/24 1315 (!) 128/90 -- -- 87 -- -- --   12/04/24 1300 124/85 -- -- 75 -- -- --   12/04/24 1245 125/86 -- -- 96 -- -- --   12/04/24 1230 (!) 132/96 -- -- 98 -- -- --   12/04/24 1136 131/83 -- -- 111 19 95 % --   12/04/24 1121 130/87 -- -- 113 11 100 % --   12/04/24 1106 133/88 -- -- 113 28 93 % --   12/04/24 1053 (!) 139/92 97.8  F (36.6  C) Temporal 118 18 99 % 84.5 kg (186 lb 4.8 oz)     Physical  Exam  Nursing note and vitals reviewed.  HENT:   Mouth/Throat: Moist mucous membranes.  Laceration to inner lower left lip.  Does not go all the way through lip.  Small lacerations to left & right lateral edges of tongue.  Eyes: EOMI, nonicteric sclera  Cardiovascular: Normal rate, regular rhythm, no murmurs, rubs, or gallops  Pulmonary/Chest: Effort normal and breath sounds normal. No respiratory distress. No wheezes. No rales.   Abdominal: Soft. Nontender, nondistended, no guarding or rigidity.  Mild left-sided rib pain to palpation/left upper quadrant abdominal pain.  No midline C/T/L-spine tenderness.  Musculoskeletal: Normal range of motion.   Neurological: Alert. Moves all extremities spontaneously.  Tremulous.  Tongue fasciculations.  Skin: Skin is warm and dry. No rash noted.         Diagnostics     Lab Results   Labs Ordered and Resulted from Time of ED Arrival to Time of ED Departure   COMPREHENSIVE METABOLIC PANEL - Abnormal       Result Value    Sodium 135      Potassium 3.6      Carbon Dioxide (CO2) 18 (*)     Anion Gap 19 (*)     Urea Nitrogen 6.8      Creatinine 0.73      GFR Estimate >90      Calcium 9.0      Chloride 98      Glucose 102 (*)     Alkaline Phosphatase 84       (*)      (*)     Protein Total 7.9      Albumin 4.4      Bilirubin Total 0.6     CBC WITH PLATELETS AND DIFFERENTIAL - Abnormal    WBC Count 6.8      RBC Count 4.08 (*)     Hemoglobin 13.8      Hematocrit 41.1       (*)     MCH 33.8 (*)     MCHC 33.6      RDW 12.4      Platelet Count 181      % Neutrophils 54      % Lymphocytes 33      % Monocytes 10      % Eosinophils 3      % Basophils 0      % Immature Granulocytes 0      NRBCs per 100 WBC 0      Absolute Neutrophils 3.7      Absolute Lymphocytes 2.3      Absolute Monocytes 0.7      Absolute Eosinophils 0.2      Absolute Basophils 0.0      Absolute Immature Granulocytes 0.0      Absolute NRBCs 0.0     ETHYL ALCOHOL LEVEL - Normal    Alcohol ethyl <0.01      MAGNESIUM - Normal    Magnesium 1.7     INR - Normal    INR 1.07     MAGNESIUM   PHOSPHORUS       Imaging   CT Chest/Abdomen/Pelvis w Contrast   Final Result   IMPRESSION:   1.  Acute appearing fractures of the left transverse processes of T12,   L1 and L2.   2.  Likely small laceration in the inferior aspect of the spleen with   small perihepatic hematoma (AAST grade 2).   3.  Diffuse hepatic steatosis with some questionable subtle changes of   chronic liver disease.   4.  Likely sequela of prior pancreatitis. No evidence of acute   inflammation.   5.  Slight enlargement of left adrenal nodule, statistically   represents an adenoma but could consider biochemical workup and   follow-up imaging in one year to document size stability.      CATHY HARRY MD            SYSTEM ID:  [id number]      Head CT w/o contrast   Final Result   IMPRESSION: No evidence of acute intracranial hemorrhage, mass, or   herniation.         TR WORLEY MD            SYSTEM ID:  [id number]            Independent Interpretation   I independently reviewed the head CT. I see no evidence of intracranial hemorrhage.       ED Course      Medications Administered   Medications   amitriptyline (ELAVIL) tablet 50 mg (has no administration in time range)   pantoprazole (PROTONIX) EC tablet 40 mg (has no administration in time range)   sertraline (ZOLOFT) tablet 150 mg (has no administration in time range)   traZODone (DESYREL) tablet 200 mg (has no administration in time range)   lidocaine 1 % 0.1-1 mL (has no administration in time range)   lidocaine (LMX4) cream (has no administration in time range)   sodium chloride (PF) 0.9% PF flush 3 mL (has no administration in time range)   sodium chloride (PF) 0.9% PF flush 3 mL (has no administration in time range)   senna-docusate (SENOKOT-S/PERICOLACE) 8.6-50 MG per tablet 1 tablet (has no administration in time range)     Or   senna-docusate (SENOKOT-S/PERICOLACE) 8.6-50 MG per tablet 2 tablet (has  no administration in time range)   calcium carbonate (TUMS) chewable tablet 1,000 mg (has no administration in time range)   acetaminophen (TYLENOL) tablet 650 mg (has no administration in time range)     Or   acetaminophen (TYLENOL) Suppository 650 mg (has no administration in time range)   oxyCODONE IR (ROXICODONE) half-tab 2.5 mg (has no administration in time range)   oxyCODONE (ROXICODONE) tablet 5 mg (has no administration in time range)   HYDROmorphone (DILAUDID) injection 0.2 mg (has no administration in time range)   HYDROmorphone (DILAUDID) injection 0.4 mg (has no administration in time range)   polyethylene glycol (MIRALAX) Packet 17 g (has no administration in time range)   ondansetron (ZOFRAN ODT) ODT tab 4 mg (has no administration in time range)     Or   ondansetron (ZOFRAN) injection 4 mg (has no administration in time range)   cloNIDine (CATAPRES) tablet 0.1 mg (has no administration in time range)   OLANZapine zydis (zyPREXA) ODT tab 5-10 mg (has no administration in time range)     Or   haloperidol lactate (HALDOL) injection 2.5-5 mg (has no administration in time range)   flumazenil (ROMAZICON) injection 0.2 mg (has no administration in time range)   melatonin tablet 5 mg (has no administration in time range)   LORazepam (ATIVAN) tablet 1-2 mg (has no administration in time range)     Or   LORazepam (ATIVAN) injection 1-2 mg (has no administration in time range)   thiamine (B-1) tablet 100 mg (has no administration in time range)   folic acid (FOLVITE) tablet 1 mg (has no administration in time range)   multivitamin w/minerals (THERA-VIT-M) tablet 1 tablet (has no administration in time range)   gabapentin (NEURONTIN) tablet 1,200 mg (has no administration in time range)   gabapentin (NEURONTIN) capsule 900 mg (has no administration in time range)   gabapentin (NEURONTIN) capsule 600 mg (has no administration in time range)   gabapentin (NEURONTIN) capsule 300 mg (has no administration in time  range)   gabapentin (NEURONTIN) capsule 100 mg (has no administration in time range)   sodium chloride 0.9 % infusion (has no administration in time range)   LORazepam (ATIVAN) injection 2 mg (2 mg Intravenous $Given 12/4/24 1123)   iopamidol (ISOVUE-370) solution 500 mL (93 mLs Intravenous $Given 12/4/24 1143)   CT scan flush use (63 mLs As instructed $Given 12/4/24 1143)   HYDROmorphone (PF) (DILAUDID) injection 0.5 mg (0.5 mg Intravenous $Given 12/4/24 1330)   HYDROmorphone (PF) (DILAUDID) injection 0.5 mg (0.5 mg Intravenous $Given 12/4/24 1500)       Procedures   Procedures     Discussion of Management:  General/Trauma surgery, Dr. Smiley.   Hospitalist consult: Dr. Altamirano.         Additional Documentation  None    Medical Decision Making / Diagnosis     CMS Diagnoses: None    MIPS       None    MDM   Tello Jones is a 39 year old male who presents following seizure.  He is also reporting some left-sided flank pain.  Regarding seizure, most likely etiology is alcohol withdrawal given tongue fasciculations, tremors, and history of reducing alcohol use.  Also considered seizure due to head trauma such as bleed, epilepsy, electrolyte derangement, among others.  Regarding flank pain, considered rib fracture, renal injury, splenic injury, spine fracture, musculoskeletal contusion, among others.  CTs as above.  Patient found to have small splenic lac as well as some transverse process fractures.  Patient hemodynamically stable.  Splenic lac discussed with general surgery who recommends admission.  No surgical intervention planned.  For alcohol withdrawal, patient treated with IV Ativan with some improvement.  Will likely need ongoing dosing.  Hospitalist service consulted for management inpt and I spoke with Dr. Altamirano.  I discussed with patient and answered all questions.  He is in agreement with plan for admission.  All questions answered.    Disposition   The patient was admitted to the hospital.      Diagnosis     ICD-10-CM    1. Alcohol withdrawal seizure with complication (H)  F10.939     R56.9       2. Laceration of spleen, initial encounter  S36.039A       3. Closed fracture of transverse process of lumbar vertebra, initial encounter (H)  S32.009A                 Jose Ruiz MD  12/04/24 1782

## 2024-12-04 NOTE — ED NOTES
[facility]  ED Nurse Handoff Report    ED Chief complaint: Seizures  . ED Diagnosis:   Final diagnoses:   None       Allergies:   Allergies   Allergen Reactions    Cefaclor Rash       Code Status: Full Code    Activity level - Baseline/Home:  independent.  Activity Level - Current:   standby.   Lift room needed: No.   Bariatric: No   Needed: No   Isolation: No.   Infection: Not Applicable.     Respiratory status: Room air    Vital Signs (within 30 minutes):   Vitals:    12/04/24 1053 12/04/24 1106 12/04/24 1121 12/04/24 1136   BP: (!) 139/92 133/88 130/87 131/83   Pulse: 118 113 113 111   Resp: 18 28 11 19   Temp: 97.8  F (36.6  C)      TempSrc: Temporal      SpO2: 99% 93% 100% 95%   Weight: 84.5 kg (186 lb 4.8 oz)          Cardiac Rhythm:  ,      Pain level:    Patient confused: No.   Patient Falls Risk: nonskid shoes/slippers when out of bed and patient and family education.   Elimination Status: Has voided     Patient Report - Initial Complaint: . Pt arrives via EMS, EMS reports that pt comes from work where pt had a witnessed seizure PTA, EMS reports that this was full tonic clonic per coworker and lasted about 3 minutes. No seizure hx per pt, pt AxOx4 upon arrival. Pt bit tongue as well, and reporting left flank pain per pt. VSS   Focused Assessment: Last drink last night. No withdrawal symptoms at this time.      Abnormal Results:   Labs Ordered and Resulted from Time of ED Arrival to Time of ED Departure   COMPREHENSIVE METABOLIC PANEL - Abnormal       Result Value    Sodium 135      Potassium 3.6      Carbon Dioxide (CO2) 18 (*)     Anion Gap 19 (*)     Urea Nitrogen 6.8      Creatinine 0.73      GFR Estimate >90      Calcium 9.0      Chloride 98      Glucose 102 (*)     Alkaline Phosphatase 84       (*)      (*)     Protein Total 7.9      Albumin 4.4      Bilirubin Total 0.6     CBC WITH PLATELETS AND DIFFERENTIAL - Abnormal    WBC Count 6.8      RBC Count  4.08 (*)     Hemoglobin 13.8      Hematocrit 41.1       (*)     MCH 33.8 (*)     MCHC 33.6      RDW 12.4      Platelet Count 181      % Neutrophils 54      % Lymphocytes 33      % Monocytes 10      % Eosinophils 3      % Basophils 0      % Immature Granulocytes 0      NRBCs per 100 WBC 0      Absolute Neutrophils 3.7      Absolute Lymphocytes 2.3      Absolute Monocytes 0.7      Absolute Eosinophils 0.2      Absolute Basophils 0.0      Absolute Immature Granulocytes 0.0      Absolute NRBCs 0.0     ETHYL ALCOHOL LEVEL - Normal    Alcohol ethyl <0.01     MAGNESIUM - Normal    Magnesium 1.7     INR        CT Chest/Abdomen/Pelvis w Contrast   Final Result   IMPRESSION:   1.  Acute appearing fractures of the left transverse processes of T12,   L1 and L2.   2.  Likely small laceration in the inferior aspect of the spleen with   small perihepatic hematoma (AAST grade 2).   3.  Diffuse hepatic steatosis with some questionable subtle changes of   chronic liver disease.   4.  Likely sequela of prior pancreatitis. No evidence of acute   inflammation.   5.  Slight enlargement of left adrenal nodule, statistically   represents an adenoma but could consider biochemical workup and   follow-up imaging in one year to document size stability.      CATHY HARRY MD            SYSTEM ID:  [id number]      Head CT w/o contrast   Final Result   IMPRESSION: No evidence of acute intracranial hemorrhage, mass, or   herniation.         TR WORLEY MD            SYSTEM ID:  [id number]          Treatments provided: IV ativan  Family Comments: NA  OBS brochure/video discussed/provided to patient:  No  ED Medications:   Medications   LORazepam (ATIVAN) injection 2 mg (2 mg Intravenous $Given 12/4/24 1123)   iopamidol (ISOVUE-370) solution 500 mL (93 mLs Intravenous $Given 12/4/24 1143)   CT scan flush use (63 mLs As instructed $Given 12/4/24 1143)       Drips infusing:  No  For the majority of the shift this patient was Green.    Interventions performed were NA.    Sepsis treatment initiated: No    Cares/treatment/interventions/medications to be completed following ED care: NA    ED Nurse Name: Shiela Hernandez RN  1:13 PM    RECEIVING UNIT ED HANDOFF REVIEW    Above ED Nurse Handoff Report was reviewed: Yes  Reviewed by: Safia Mayer RN on December 4, 2024 at 4:51 PM

## 2024-12-04 NOTE — H&P
[facility]   Trauma Surgical H&P            Assessment and Plan:   Assessment:   Tello Jones is a 39 year old male who presents after alcohol withdrawal seizure.    Acute traumatic injuries by imaging:   Grade II splenic laceration, Hb stable at 13.8  Left transverse process fractures of T12, L1, L2.        Plan:   Admit to surgery service for solid organ injury and spine fractures  #Splenic laceration:  -Q8hr hemoglobin checks  -Bedrest with commode x 24 hrs  -Discussed with patient low risk for bleeding that would require intervention such as embolization    TP fractures: pain control. Discussed with neurosurgery -  no bracing or follow up needed.    ETOH withdrawal:   -Consult hospitalist    Case discussed with consulting provider:  Dr Stewart    Dispo: pending alcohol withdrawal. From trauma standpoint if Hb stable tomorrow can mobilize off bedrest and possible discharge. Will need to avoid strenuous physical activity and contact sports/activities for 2 months.              Chief Complaint:   seizure     History is obtained from the patient.         History of Present Illness:   Time present for consultation in the ED: 1300    Tello Jones is a 39 year old  male who presented to the ED after seizure at work. Reports no memory of event. He was at work and his coworkers called ambulance. Generalized seizure activity noted for multiple minutes.  Reports heavy drinking but cut back to a couple drinks per day in the last week, last drink was last evening  Complains of left flank pain.  Able to move around ok  Denies shortness of breath, chest pain, abdominal pain, nausea, emesis, dizziness, visual changes, headache, neck pain, extremity pain.               Past Medical History:    has no past medical history on file.  Etoh abuse/dependence  Heartburn  Depression and anxiety  History of alcohol induced pancreatitis         Past Surgical History:   No past surgical history on file.          Social History:   Alcohol use as in hpi            Family History:   No family history on file.         Allergies:     Allergies   Allergen Reactions    Cefaclor Rash             Medications:     No current facility-administered medications for this encounter.     Current Outpatient Medications   Medication Sig Dispense Refill    amitriptyline (ELAVIL) 50 MG tablet Take 50 mg by mouth at bedtime.      cetirizine (ZYRTEC) 10 MG tablet Take 10 mg by mouth daily as needed.      multivitamin (CENTRUM SILVER) tablet Take 1 tablet by mouth daily      pantoprazole (PROTONIX) 40 MG EC tablet Take 40 mg by mouth daily.      sertraline (ZOLOFT) 100 MG tablet Take 150 mg by mouth daily.      traZODone (DESYREL) 100 MG tablet Take 200 mg by mouth at bedtime.       No current facility-administered medications for this encounter.            Review of Systems:   The 10 point review of systems is negative other than noted in the HPI.           Physical Exam:   BP (!) 128/90   Pulse 87   Temp 97.8  F (36.6  C) (Temporal)   Resp 19   Wt 84.5 kg (186 lb 4.8 oz)   SpO2 95%   BMI 25.27 kg/m    General appearance: well-nourished, no apparent distress  Head: Head is normocephalic and atraumatic.  Eyes: Pupils are equal and round and reactive to light. Eyes atraumatic. EOM full, no proptosis, no conjunctival injection  ENT: External ears normal. Nose without injury.   Small laceration on the tongue and left lower lip. No malocclusion. Trachea midline, no neck swelling or masses noted.   Neck: No midline tenderness  Chest:  Clear to auscultation bilaterally.  Heart with regular rate and rhythm, no murmurs.  No palpable swelling, masses, ecchymosis, no crepitus, no visible deformity.  Abdomen:  Nondistended, soft, nontender to palpation  Back:no tenderness midline thoracic or lumbar spine, no abrasions, overlying skin changes, or palpable step-offs.  Mild tenderness over the lower left rib cage/flank with small abrasion  Extremities:  Bilateral upper extremities normal without injury, Full ROM of all major joints   Bilateral lower extremities normal without injury, Full ROM of all major joints   Neurologic: nonfocal, grossly intact times four extremities with normal strength, alert and oriented times three.  Cranial nerves II through XII intact grossly.  Psychiatric: mood and affect are appropriate.  Skin: Abrasions  as noted above.             Data:   WBC -   WBC Count   Date Value Ref Range Status   12/04/2024 6.8 4.0 - 11.0 10e3/uL Final   ], HgB -   Hemoglobin   Date Value Ref Range Status   12/04/2024 13.8 13.3 - 17.7 g/dL Final   ]   Liver Function Studies -   Recent Labs   Lab Test 12/04/24  1054   PROTTOTAL 7.9   ALBUMIN 4.4   BILITOTAL 0.6   ALKPHOS 84   *   *     INR 1.09    IMAGING:  Results for orders placed or performed during the hospital encounter of 12/04/24   Head CT w/o contrast    Narrative    CT SCAN OF THE HEAD WITHOUT CONTRAST   12/4/2024 12:03 PM     HISTORY: Seizure, fall.    TECHNIQUE: Axial images of the head and coronal reformations without  IV contrast material. Radiation dose for this scan was reduced using  automated exposure control, adjustment of the mA and/or kV according  to patient size, or iterative reconstruction technique.    COMPARISON: Head CT 9/11/2024.    FINDINGS: There is no evidence of intracranial hemorrhage, mass, acute  infarct or anomaly. The ventricles are normal in size, shape and  configuration. The brain parenchyma and subarachnoid spaces are  normal.     The visualized portions of the sinuses and mastoids appear normal. The  bony calvarium and bones of the skull base appear intact.       Impression    IMPRESSION: No evidence of acute intracranial hemorrhage, mass, or  herniation.      TR WORLEY MD         SYSTEM ID:  [id number]   CT Chest/Abdomen/Pelvis w Contrast    Narrative    CT CHEST/ABDOMEN/PELVIS W CONTRAST 12/4/2024 12:03 PM    CLINICAL HISTORY: seizure, left-sided  flank pain    TECHNIQUE: CT scan of the chest, abdomen, and pelvis was performed  following injection of IV contrast. Multiplanar reformats were  obtained. Dose reduction techniques were used.   CONTRAST: 93mL Isovue-370    COMPARISON: CT abdomen/pelvis 10/19/2021.    FINDINGS:   LUNGS AND PLEURA: No focal airspace consolidation, pleural effusion or  pneumothorax. Stable size of 2 mm juxtapleural right lower lobe nodule  since at least 2021, no specific follow-up recommended given long-term  stability.    MEDIASTINUM/AXILLAE: Unremarkable.    CORONARY ARTERY CALCIFICATION: None.    HEPATOBILIARY: Diffuse hepatic steatosis with some subtle contour  nodularity and widening of the fissures. No evidence of cholecystitis  or biliary obstruction.    PANCREAS: Atrophic with some subtle ductal irregularity. No focal  lesion or acute surrounding fat stranding.    SPLEEN: New, somewhat linear hypoechoic lesion in the inferior aspect  of the spleen (series 3 image 138). There is also a small perisplenic  fluid collection, most pronounced superiorly, measuring up to 1.5 cm  in short axis (series 3 image 105), abuts less than 50% of the  circumference of the spleen. No evidence of active bleeding.    ADRENAL GLANDS: Slight enlargement of left adrenal nodule, now  measuring 1.2 cm, previously 0.8 cm (series 3 image 128). Right  adrenal gland is unremarkable.    KIDNEYS/BLADDER: No hydronephrosis. Urinary bladder is unremarkable.    BOWEL: No obstruction or inflammatory change. Normal appendix. No  mesenteric hematoma or pneumoperitoneum.    PELVIC ORGANS: Unremarkable.    ADDITIONAL FINDINGS: No lymphadenopathy or free fluid. No evidence of  acute vascular injury. Small fat-containing right inguinal hernia  without evidence of acute complication.    MUSCULOSKELETAL: No acute bony abnormality. Acute appearing fractures  of the left transverse processes of T12, L1 and L2. Healed left rib  fractures.      Impression     IMPRESSION:  1.  Acute appearing fractures of the left transverse processes of T12,  L1 and L2.  2.  Likely small laceration in the inferior aspect of the spleen with  small perihepatic hematoma (AAST grade 2).  3.  Diffuse hepatic steatosis with some questionable subtle changes of  chronic liver disease.  4.  Likely sequela of prior pancreatitis. No evidence of acute  inflammation.  5.  Slight enlargement of left adrenal nodule, statistically  represents an adenoma but could consider biochemical workup and  follow-up imaging in one year to document size stability.    CATHY HARRY MD         SYSTEM ID:  [id number]       This note was created using voice recognition software. Undetected word substitutions or other errors may have occurred.     Linda Smiley MD

## 2024-12-04 NOTE — LETTER
[facility] ORTHO SPINE  [address]  [facility] [address]  Phone: [phone]  Fax: [phone]    December 7, 2024        Tello Jones  [address] [facility] [address]          To whom it may concern:    RE: Tello Jones was admitted to this hospital on 12/4/2024 and will discharge home today.  He sustained fractures to his spine and a laceration of his spleen for which he has been instructed to not engage in strenuous activity or work for 2 months. He is expected to be safe and comfortable to return to activity as tolerated on or about Monday, 12/16/2024, with a restriction to not lift greater than 20 lbs through about February 5, 2025.      Please contact me for questions or concerns.      Sincerely,          Davon Gordon MD

## 2024-12-04 NOTE — ED NOTES
Bed: ED12  Expected date: 12/4/24  Expected time: 10:42 AM  Means of arrival:   Comments:  [facility]

## 2024-12-04 NOTE — PROGRESS NOTES
Pt is A/O x4. CIWA 4. VSS, tele: SR with trigeminal PVCs. Oxycodone given x1 for left sided abdominal pain, reports not effective. IVF started. Bedrest with bathroom privileges x 24 hours. Hgb 13.5. Mag and Phos protocol, recheck in AM. Pt transferred to room 643. Significant other at the bedside.

## 2024-12-04 NOTE — PHARMACY-ADMISSION MEDICATION HISTORY
Pharmacist Admission Medication History    Admission medication history is complete. The information provided in this note is only as accurate as the sources available at the time of the update.    Information Source(s): Patient via in-person, [facility] fill hx    Pertinent Information: -    Changes made to PTA medication list:  Added: pantoprazole  Deleted: gabapentin  Changed:   -amitriptyline 25 mg at bedtime prn to 50 mg at bedtime scheduled  -cetirizine daily to daily prn  -sertraline 100 mg daily to 150 mg daily  -trazodone 100 mg at bedtime prn to 200 mg at bedtime scheduled    Allergies reviewed with patient and updates made in EHR: yes    Medication History Completed By: Cecily Shepard RPH 12/4/2024 1:36 PM    PTA Med List   Medication Sig Last Dose/Taking    amitriptyline (ELAVIL) 50 MG tablet Take 50 mg by mouth at bedtime. 12/3/2024    cetirizine (ZYRTEC) 10 MG tablet Take 10 mg by mouth daily as needed. More than a month    multivitamin (CENTRUM SILVER) tablet Take 1 tablet by mouth daily Past Month    pantoprazole (PROTONIX) 40 MG EC tablet Take 40 mg by mouth daily. 12/4/2024 Morning    sertraline (ZOLOFT) 100 MG tablet Take 150 mg by mouth daily. 12/4/2024 Morning    traZODone (DESYREL) 100 MG tablet Take 200 mg by mouth at bedtime. 12/3/2024

## 2024-12-04 NOTE — CONSULTS
[facility]  Consult Note - Hospitalist Service  Date of Admission:  12/4/2024  Consult Requested by: Dr. Smiley  Reason for Consult: Medical management for alcohol withdrawals.    Assessment & Plan   Tello Jones is a 39 year old male admitted on 12/4/2024. He has a past medical history significant for alcohol use disorder, depression, and GERD.  He presented to emergency room after a witnessed seizure event at work.  Suspected to be acute alcohol withdrawal related seizure.    Acute alcohol withdrawals.  Alcohol use disorder.  -Start scheduled clonidine 0.1 mg every 8 hours with hold parameters.  -Start gabapentin taper.  -Oral vitamins with folic acid, thiamine, multivitamin.  -Continuous IV fluids for 10 hours with normal saline 100 cc an hour.  -CIWA monitoring.  -Lorazepam if needed.    Seizure.  -Suspect acute alcohol withdrawal related seizure.  -Seizure precautions.  -Treat withdrawals as above.  -If further seizure activity were to be witnessed, would get neurology consult.    Spleen laceration.  -General/trauma Surgery following.  -Monitor hemoglobin every 8 hours.    T12, L1, L2 transverse process fractures.  -General/trauma surgery following.  -They did discuss with neurosurgery.  -Pain medications as needed.    GERD.  -Pantoprazole 40 mg a day.    Depression.  -Sertraline 150 mg a day.       Clinically Significant Risk Factors Present on Admission              # Anion Gap Metabolic Acidosis: Highest Anion Gap = 19 mmol/L in last 2 days, will monitor and treat as appropriate                           Juan J Altamirano DO  Hospitalist Service  Securely message with [employer] (more info)  Text page via [facility] Paging/Directory   ______________________________________________________________________    Chief Complaint   Alcohol withdrawals.    History is obtained from the patient    History of Present Illness   Tello Jones is a 39 year old male who has a past medical history  significant for alcohol use disorder, depression, and GERD.  He normally drinks a bottle of hard liquor most days.  He has been trying to cut back on his alcohol use recently.  Had been down to 1-2 alcoholic beverages a day for the past couple of days.  Had a witnessed seizure at work today.  No known seizure history.  Patient does not remember the episode.  Currently having back and left lower quadrant abdominal pain.  Pain medications did seem to help with pain.  Notices a swollen lower lip.  No other acute complaints.      Past Medical History    No past medical history on file.    Past Surgical History   No past surgical history on file.    Medications   I have reviewed this patient's current medications       Allergies   Allergies   Allergen Reactions    Cefaclor Rash        Physical Exam   Vital Signs: Temp: 97.8  F (36.6  C) Temp src: Temporal BP: 123/81 Pulse: 87   Resp: 19 SpO2: 95 % O2 Device: None (Room air)    Weight: 186 lbs 4.8 oz    Gen:  NAD, A&Ox3.  Eyes:  PERRL, sclera anicteric.  OP:  MMM, small lesion on right side of tongue appears to be a likely bite melisa, swollen left lower lip.  Neck:  Supple.  CV:  Regular, no murmurs.  Lung:  CTA b/l, normal effort.  Ab:  +BS, soft.  Skin:  Warm, dry to touch.  No rash.  Ext:  No pitting edema LE b/l.      Medical Decision Making       75 MINUTES SPENT BY ME on the date of service doing chart review, history, exam, documentation & further activities per the note.      Data     I have personally reviewed the following data over the past 24 hrs:    6.8  \   13.8   / 181     135 98 6.8 /  102 (H)   3.6 18 (L) 0.73 \     ALT: 104 (H) AST: 232 (H) AP: 84 TBILI: 0.6   ALB: 4.4 TOT PROTEIN: 7.9 LIPASE: N/A     INR:  1.07 PTT:  N/A   D-dimer:  N/A Fibrinogen:  N/A       Imaging results reviewed over the past 24 hrs:   Recent Results (from the past 24 hours)   Head CT w/o contrast    Narrative    CT SCAN OF THE HEAD WITHOUT CONTRAST   12/4/2024 12:03 PM     HISTORY:  Seizure, fall.    TECHNIQUE: Axial images of the head and coronal reformations without  IV contrast material. Radiation dose for this scan was reduced using  automated exposure control, adjustment of the mA and/or kV according  to patient size, or iterative reconstruction technique.    COMPARISON: Head CT 9/11/2024.    FINDINGS: There is no evidence of intracranial hemorrhage, mass, acute  infarct or anomaly. The ventricles are normal in size, shape and  configuration. The brain parenchyma and subarachnoid spaces are  normal.     The visualized portions of the sinuses and mastoids appear normal. The  bony calvarium and bones of the skull base appear intact.       Impression    IMPRESSION: No evidence of acute intracranial hemorrhage, mass, or  herniation.      TR WORLEY MD         SYSTEM ID:  [id number]   CT Chest/Abdomen/Pelvis w Contrast    Narrative    CT CHEST/ABDOMEN/PELVIS W CONTRAST 12/4/2024 12:03 PM    CLINICAL HISTORY: seizure, left-sided flank pain    TECHNIQUE: CT scan of the chest, abdomen, and pelvis was performed  following injection of IV contrast. Multiplanar reformats were  obtained. Dose reduction techniques were used.   CONTRAST: 93mL [id number]-370    COMPARISON: CT abdomen/pelvis 10/19/2021.    FINDINGS:   LUNGS AND PLEURA: No focal airspace consolidation, pleural effusion or  pneumothorax. Stable size of 2 mm juxtapleural right lower lobe nodule  since at least 2021, no specific follow-up recommended given long-term  stability.    MEDIASTINUM/AXILLAE: Unremarkable.    CORONARY ARTERY CALCIFICATION: None.    HEPATOBILIARY: Diffuse hepatic steatosis with some subtle contour  nodularity and widening of the fissures. No evidence of cholecystitis  or biliary obstruction.    PANCREAS: Atrophic with some subtle ductal irregularity. No focal  lesion or acute surrounding fat stranding.    SPLEEN: New, somewhat linear hypoechoic lesion in the inferior aspect  of the spleen (series 3 image 138). There is  also a small perisplenic  fluid collection, most pronounced superiorly, measuring up to 1.5 cm  in short axis (series 3 image 105), abuts less than 50% of the  circumference of the spleen. No evidence of active bleeding.    ADRENAL GLANDS: Slight enlargement of left adrenal nodule, now  measuring 1.2 cm, previously 0.8 cm (series 3 image 128). Right  adrenal gland is unremarkable.    KIDNEYS/BLADDER: No hydronephrosis. Urinary bladder is unremarkable.    BOWEL: No obstruction or inflammatory change. Normal appendix. No  mesenteric hematoma or pneumoperitoneum.    PELVIC ORGANS: Unremarkable.    ADDITIONAL FINDINGS: No lymphadenopathy or free fluid. No evidence of  acute vascular injury. Small fat-containing right inguinal hernia  without evidence of acute complication.    MUSCULOSKELETAL: No acute bony abnormality. Acute appearing fractures  of the left transverse processes of T12, L1 and L2. Healed left rib  fractures.      Impression    IMPRESSION:  1.  Acute appearing fractures of the left transverse processes of T12,  L1 and L2.  2.  Likely small laceration in the inferior aspect of the spleen with  small perihepatic hematoma (AAST grade 2).  3.  Diffuse hepatic steatosis with some questionable subtle changes of  chronic liver disease.  4.  Likely sequela of prior pancreatitis. No evidence of acute  inflammation.  5.  Slight enlargement of left adrenal nodule, statistically  represents an adenoma but could consider biochemical workup and  follow-up imaging in one year to document size stability.    CATHY HARRY MD         SYSTEM ID:  [id number]

## 2024-12-04 NOTE — ED TRIAGE NOTES
Pt arrives via EMS, EMS reports that pt comes from work where pt had a witnessed seizure PTA, EMS reports that this was full tonic clonic per coworker and lasted about 3 minutes. No seizure hx per pt, pt AxOx4 upon arrival. Pt bit tongue as well, and reporting left flank pain per pt. VSS

## 2024-12-05 VITALS
BODY MASS INDEX: 25.27 KG/M2 | WEIGHT: 186.3 LBS | HEART RATE: 86 BPM | SYSTOLIC BLOOD PRESSURE: 104 MMHG | TEMPERATURE: 98.2 F | DIASTOLIC BLOOD PRESSURE: 68 MMHG | OXYGEN SATURATION: 93 % | RESPIRATION RATE: 17 BRPM

## 2024-12-05 LAB
ALBUMIN SERPL BCG-MCNC: 3.5 G/DL (ref 3.5–5.2)
ALP SERPL-CCNC: 64 U/L (ref 40–150)
ALT SERPL W P-5'-P-CCNC: 68 U/L (ref 0–70)
ANION GAP SERPL CALCULATED.3IONS-SCNC: 13 MMOL/L (ref 7–15)
AST SERPL W P-5'-P-CCNC: 98 U/L (ref 0–45)
BILIRUB SERPL-MCNC: 0.8 MG/DL
BUN SERPL-MCNC: 7.5 MG/DL (ref 6–20)
CALCIUM SERPL-MCNC: 8.6 MG/DL (ref 8.8–10.4)
CHLORIDE SERPL-SCNC: 101 MMOL/L (ref 98–107)
CREAT SERPL-MCNC: 0.69 MG/DL (ref 0.67–1.17)
EGFRCR SERPLBLD CKD-EPI 2021: >90 ML/MIN/1.73M2
GLUCOSE SERPL-MCNC: 104 MG/DL (ref 70–99)
HCO3 SERPL-SCNC: 22 MMOL/L (ref 22–29)
HGB BLD-MCNC: 11.9 G/DL (ref 13.3–17.7)
HGB BLD-MCNC: 12.2 G/DL (ref 13.3–17.7)
HGB BLD-MCNC: 12.3 G/DL (ref 13.3–17.7)
MAGNESIUM SERPL-MCNC: 1.7 MG/DL (ref 1.7–2.3)
POTASSIUM SERPL-SCNC: 3.5 MMOL/L (ref 3.4–5.3)
PROT SERPL-MCNC: 6.3 G/DL (ref 6.4–8.3)
SODIUM SERPL-SCNC: 136 MMOL/L (ref 135–145)

## 2024-12-05 PROCEDURE — 85018 HEMOGLOBIN: CPT | Performed by: SURGERY

## 2024-12-05 PROCEDURE — 250N000013 HC RX MED GY IP 250 OP 250 PS 637: Performed by: SURGERY

## 2024-12-05 PROCEDURE — 250N000011 HC RX IP 250 OP 636: Performed by: SURGERY

## 2024-12-05 PROCEDURE — 36415 COLL VENOUS BLD VENIPUNCTURE: CPT | Performed by: SURGERY

## 2024-12-05 PROCEDURE — 99231 SBSQ HOSP IP/OBS SF/LOW 25: CPT | Performed by: SURGERY

## 2024-12-05 PROCEDURE — 120N000001 HC R&B MED SURG/OB

## 2024-12-05 PROCEDURE — 83735 ASSAY OF MAGNESIUM: CPT | Performed by: INTERNAL MEDICINE

## 2024-12-05 PROCEDURE — 250N000013 HC RX MED GY IP 250 OP 250 PS 637: Performed by: INTERNAL MEDICINE

## 2024-12-05 PROCEDURE — 99232 SBSQ HOSP IP/OBS MODERATE 35: CPT | Performed by: INTERNAL MEDICINE

## 2024-12-05 PROCEDURE — 80053 COMPREHEN METABOLIC PANEL: CPT | Performed by: INTERNAL MEDICINE

## 2024-12-05 RX ORDER — NALOXONE HYDROCHLORIDE 0.4 MG/ML
0.2 INJECTION, SOLUTION INTRAMUSCULAR; INTRAVENOUS; SUBCUTANEOUS
Status: DISCONTINUED | OUTPATIENT
Start: 2024-12-05 | End: 2024-12-07 | Stop reason: HOSPADM

## 2024-12-05 RX ORDER — NALOXONE HYDROCHLORIDE 0.4 MG/ML
0.4 INJECTION, SOLUTION INTRAMUSCULAR; INTRAVENOUS; SUBCUTANEOUS
Status: DISCONTINUED | OUTPATIENT
Start: 2024-12-05 | End: 2024-12-07 | Stop reason: HOSPADM

## 2024-12-05 RX ORDER — OXYCODONE HYDROCHLORIDE 5 MG/1
5 TABLET ORAL EVERY 4 HOURS PRN
Status: DISCONTINUED | OUTPATIENT
Start: 2024-12-05 | End: 2024-12-06

## 2024-12-05 RX ORDER — OXYCODONE HYDROCHLORIDE 10 MG/1
10 TABLET ORAL EVERY 4 HOURS PRN
Status: DISCONTINUED | OUTPATIENT
Start: 2024-12-05 | End: 2024-12-06

## 2024-12-05 RX ADMIN — OXYCODONE HYDROCHLORIDE 10 MG: 10 TABLET ORAL at 20:17

## 2024-12-05 RX ADMIN — GABAPENTIN 900 MG: 300 CAPSULE ORAL at 23:00

## 2024-12-05 RX ADMIN — HYDROMORPHONE HYDROCHLORIDE 0.4 MG: 0.2 INJECTION, SOLUTION INTRAMUSCULAR; INTRAVENOUS; SUBCUTANEOUS at 17:14

## 2024-12-05 RX ADMIN — AMITRIPTYLINE HYDROCHLORIDE 50 MG: 50 TABLET, FILM COATED ORAL at 23:00

## 2024-12-05 RX ADMIN — OXYCODONE HYDROCHLORIDE 10 MG: 10 TABLET ORAL at 14:45

## 2024-12-05 RX ADMIN — HYDROMORPHONE HYDROCHLORIDE 0.4 MG: 0.2 INJECTION, SOLUTION INTRAMUSCULAR; INTRAVENOUS; SUBCUTANEOUS at 13:22

## 2024-12-05 RX ADMIN — FOLIC ACID 1 MG: 1 TABLET ORAL at 08:11

## 2024-12-05 RX ADMIN — CLONIDINE HYDROCHLORIDE 0.1 MG: 0.1 TABLET ORAL at 08:11

## 2024-12-05 RX ADMIN — THIAMINE HCL TAB 100 MG 100 MG: 100 TAB at 08:11

## 2024-12-05 RX ADMIN — SERTRALINE HYDROCHLORIDE 150 MG: 50 TABLET ORAL at 08:11

## 2024-12-05 RX ADMIN — HYDROMORPHONE HYDROCHLORIDE 0.2 MG: 0.2 INJECTION, SOLUTION INTRAMUSCULAR; INTRAVENOUS; SUBCUTANEOUS at 23:01

## 2024-12-05 RX ADMIN — Medication 1 TABLET: at 08:11

## 2024-12-05 RX ADMIN — GABAPENTIN 900 MG: 300 CAPSULE ORAL at 08:11

## 2024-12-05 RX ADMIN — SENNOSIDES AND DOCUSATE SODIUM 2 TABLET: 50; 8.6 TABLET ORAL at 17:30

## 2024-12-05 RX ADMIN — OXYCODONE HYDROCHLORIDE 5 MG: 5 TABLET ORAL at 04:56

## 2024-12-05 RX ADMIN — TRAZODONE HYDROCHLORIDE 200 MG: 100 TABLET ORAL at 23:00

## 2024-12-05 RX ADMIN — HYDROMORPHONE HYDROCHLORIDE 0.4 MG: 0.2 INJECTION, SOLUTION INTRAMUSCULAR; INTRAVENOUS; SUBCUTANEOUS at 08:16

## 2024-12-05 RX ADMIN — PANTOPRAZOLE SODIUM 40 MG: 40 TABLET, DELAYED RELEASE ORAL at 08:11

## 2024-12-05 RX ADMIN — OXYCODONE HYDROCHLORIDE 5 MG: 5 TABLET ORAL at 10:31

## 2024-12-05 RX ADMIN — CLONIDINE HYDROCHLORIDE 0.1 MG: 0.1 TABLET ORAL at 17:19

## 2024-12-05 RX ADMIN — CLONIDINE HYDROCHLORIDE 0.1 MG: 0.1 TABLET ORAL at 23:00

## 2024-12-05 RX ADMIN — GABAPENTIN 900 MG: 300 CAPSULE ORAL at 17:18

## 2024-12-05 RX ADMIN — POLYETHYLENE GLYCOL 3350 17 G: 17 POWDER, FOR SOLUTION ORAL at 20:17

## 2024-12-05 ASSESSMENT — ACTIVITIES OF DAILY LIVING (ADL)
ADLS_ACUITY_SCORE: 26

## 2024-12-05 NOTE — CONSULTS
Met with pt for CD Consult and introduced self and role in pt's care.  Inquired about pt's interest in DEYSI Assessment for referrals to Tx or any additional community resources.  Pt stated he was not familiar with DEYSI Tx and available resources.  This writer described various LOC of DEYSI Tx available to pt (IP vs OP), and the process of completing a DEYSI Assessment to access DEYSI Tx.  Pt was unsure how he wanted to proceed, this writer offered to call back pt tomorrow to give pt some time to think thru his options, but pt declined this and stated he plans to be back home by tomorrow.  Pt declined to complete DEYSI Assessment for referrals to Tx, but was open to receiving community CD resources, sending to pt via secure e-mail to [email or web address] and adding to pt's AVS.    Relayed to pt that if they change their mind while admitted and would like to complete a DEYSI Assessment for referrals to treatment or need any additional CD Resources they may alert unit staff who will assist in having CD Consult re-ordered.  If pt has active insurance they may also schedule an assessment on an outpatient basis by calling [facility] at [phone].    LB Skelton, [facility]  Substance Use Disorder Evaluation Counselor  Email: [email or web address]

## 2024-12-05 NOTE — PROGRESS NOTES
Surgery progress note    The patient is feeling reasonably well today.  Afebrile, vital signs stable    The patient appears slightly tremulous   breathing is nonlabored  The abdomen reveals mild left upper quadrant tenderness and moderate left flank tenderness.    Hemoglobin is 12.2.  Previous values were 12.8 and 13.5.    The patient is doing reasonably well from a surgical standpoint.  If his hemoglobin remains stable, he could likely be discharged tomorrow.  I suspect that the slight decrease in his hemoglobin thus far has been from simple equilibration.  If he has any significant drop in hemoglobin, repeat CT scan would be appropriate.    Jamil Goode MD  [facility]

## 2024-12-05 NOTE — DISCHARGE INSTRUCTIONS
CHEMICAL HEALTH RESOURCES:  The first step to accessing chemical health treatment is to complete a Chemical Health Assessment.  Chemical Health Assessments are beneficial as the  can help you find a treatment program that is a good fit for your goals and needs. The  will make the referrals for you and help follow up until you get into a program.  To schedule an Assessment with  Bernardo Du, call [facility] at [phone].     [employer] [facility] program offers access to chemical health services (individual counseling, group counseling) and peer recovery support services virtually.  For more information and to schedule, call: [phone].      To find recovery support meetings near you or online:  AA: https://[employer].org/meetings/ or call [phone]  NA: https://[employer].org/find-a-meeting/ or call 1-384.608.2606  SMART Recovery: https://meetings.smartrecovery.org/meetings/  [facility] Recovery: https://refugerecoverymeetings.org/meetings  [facility] Recovery: https://www.[employer]/what-we-offer/find-a-cr-meeting  [place] [facility] Connection: https://[facility].org/ or call [phone] (All Recovery Meetings & Telephone Recovery Support)  [facility]: https://recoverydharma.org/meetings/    Ways to help cope with sobriety:   Take prescribed medicines as scheduled   Keep follow-up appointments   Talk to others about your concerns   Get regular exercise   Practice deep breathing skills   Eat a healthy diet   Use community resources, including hotline numbers, [facility] crisis and support meetings   Stay sober and avoid places/people/things associated with substance use   Maintain a daily schedule/routine   Get at least 7-8 hours of sleep per night   Create a list 10--20 healthy activities that you can do that are enjoyable and do not involve substance use   Create daily goals (approx. 1-4 goals) per day and work to achieve them throughout the  day    [place] [facility] ([facility]): [facility] connects people seeking recovery to resources that help foster and sustain long-term recovery. Whether you are seeking resources for treatment, transportation, housing, job training, education, health care or other pathways to recovery, [facility] is a great place to start. Phone: [phone]. www.[employer] [facility] [employer] (Great listing of all types of recovery and non-recovery related resources).

## 2024-12-05 NOTE — PROGRESS NOTES
[facility]    Medicine Progress Note - Hospitalist Service    Date of Admission:  12/4/2024    Assessment & Plan   Tello Jones is a 39 year old male admitted on 12/4/2024 after having an Etoh withdrawal seizure. He had initially been admitted by Gen Surgery due to trauma that occurred with his LOC and fall. He had been found to have Grade II splenic laceration and Left transverse process fractures of T12, L1 and L2.     PMH is notable for Etoh abuse.     DX:  Etoh withdrawal sz. Has not required Benzo since initial eval in ED on 12/4.  Trauma related to LOC and fall.   Chronic etoh dependence   PLAN:  Monitor Hgb per Gen Surgery.   Cont CIWA protocol. Encouraged pt to communicate with Chem dep for information.   Discussed possible options of Naltrexone, Acamprosate and Disulfiram. Already on Sertraline 150 mg daily, takes Amitriptyline 50 mg with Trazodone 200 mg at bedtime.  Thinks he did not tolerate Naltrexone in the past.          Diet: Combination Diet Regular Diet Adult    DVT Prophylaxis: Pneumatic Compression Devices  Theodore Catheter: Not present  Lines: None     Cardiac Monitoring: ACTIVE order. Indication: Tachyarrhythmias, acute (48 hours)  Code Status: Full Code      Clinically Significant Risk Factors Present on Admission           # Hypocalcemia: Lowest Ca = 8.6 mg/dL in last 2 days, will monitor and replace as appropriate    # Anion Gap Metabolic Acidosis: Highest Anion Gap = 19 mmol/L in last 2 days, will monitor and treat as appropriate                           Social Drivers of Health    Interpersonal Safety: High Risk (12/4/2024)    Interpersonal Safety     Do you feel physically and emotionally safe where you currently live?: No     Within the past 12 months, have you been hit, slapped, kicked or otherwise physically hurt by someone?: No     Within the past 12 months, have you been humiliated or emotionally abused in other ways by your partner or ex-partner?: No           Disposition Plan     Medically Ready for Discharge: Anticipated Tomorrow             Davon Gordon MD  Hospitalist Service  [facility]  Securely message with [employer] (more info)  Text page via [facility] Paging/Directory   ______________________________________________________________________    Interval History   Chart reviewed, pt interviewed.    As noted above. I spent about 30 minutes interviewing and educating the pt today.     Physical Exam   Vital Signs: Temp: 97.9  F (36.6  C) Temp src: Temporal BP: 106/66 Pulse: 72   Resp: 18 SpO2: 95 % O2 Device: None (Room air)    Weight: 186 lbs 4.8 oz    General Appearance: Alert, coherent in NAD. No tremor.   Respiratory: no increased WOB.  CTA  Cardiovascular: RRR without murmur  GI: soft, NTND  Skin: no lesions. No jaundice  Other:      Medical Decision Making       45 MINUTES SPENT BY ME on the date of service doing chart review, history, exam, documentation & further activities per the note.      Data   Results for orders placed or performed during the hospital encounter of 12/04/24 (from the past 24 hours)   Hemoglobin   Result Value Ref Range    Hemoglobin 12.8 (L) 13.3 - 17.7 g/dL   Comprehensive metabolic panel   Result Value Ref Range    Sodium 136 135 - 145 mmol/L    Potassium 3.5 3.4 - 5.3 mmol/L    Carbon Dioxide (CO2) 22 22 - 29 mmol/L    Anion Gap 13 7 - 15 mmol/L    Urea Nitrogen 7.5 6.0 - 20.0 mg/dL    Creatinine 0.69 0.67 - 1.17 mg/dL    GFR Estimate >90 >60 mL/min/1.73m2    Calcium 8.6 (L) 8.8 - 10.4 mg/dL    Chloride 101 98 - 107 mmol/L    Glucose 104 (H) 70 - 99 mg/dL    Alkaline Phosphatase 64 40 - 150 U/L    AST 98 (H) 0 - 45 U/L    ALT 68 0 - 70 U/L    Protein Total 6.3 (L) 6.4 - 8.3 g/dL    Albumin 3.5 3.5 - 5.2 g/dL    Bilirubin Total 0.8 <=1.2 mg/dL   Hemoglobin   Result Value Ref Range    Hemoglobin 12.2 (L) 13.3 - 17.7 g/dL   Magnesium   Result Value Ref Range    Magnesium 1.7 1.7 - 2.3 mg/dL   Hemoglobin   Result Value  Ref Range    Hemoglobin 12.3 (L) 13.3 - 17.7 g/dL

## 2024-12-05 NOTE — PLAN OF CARE
Goal Outcome Evaluation:    Plan of Care Reviewed With: patient, significant other    Overall Patient Progress: no change    Outcome Evaluation: Patient arrived to unit around 1800. Seizure precautions maintained. A&O x4. RA. SBA. CIWA score of 6. Tele - sinus with frequent PVCs. Neuro checks q4. C/o severe pain - oxy5 and 0.4 dilaudid alternated.    Problem: Adult Inpatient Plan of Care  Goal: Plan of Care Review  12/4/2024 1851 by Safia Mayer RN  Outcome: Not Progressing  Flowsheets (Taken 12/4/2024 1851)  Plan of Care Reviewed With:   patient   significant other  12/4/2024 1849 by Safia Mayer RN  Outcome: Not Progressing  Flowsheets (Taken 12/4/2024 1849)  Outcome Evaluation: Patient arrived to unit around 1800. Seizure precautions maintained. A&O x4. RA. SBA. CIWA score of 6. Tele - sinus with frequent PVCs. Neuro checks q4. C/o severe pain - oxy5 and 0.4 dilaudid alternated.  Overall Patient Progress: no change  Goal: Patient-Specific Goal (Individualized)  12/4/2024 1851 by Safia Mayer RN  Outcome: Not Progressing  12/4/2024 1849 by Safia Mayer RN  Outcome: Not Progressing  Goal: Absence of Hospital-Acquired Illness or Injury  12/4/2024 1851 by Safia Mayer RN  Outcome: Not Progressing  12/4/2024 1849 by Safia Mayer RN  Outcome: Not Progressing  Intervention: Identify and Manage Fall Risk  Recent Flowsheet Documentation  Taken 12/4/2024 1744 by Safia Mayer RN  Safety Promotion/Fall Prevention:   activity supervised   assistive device/personal items within reach   clutter free environment maintained   increased rounding and observation   increase visualization of patient   lighting adjusted   mobility aid in reach   nonskid shoes/slippers when out of bed   patient and family education   room door open   room near nurse's station   room organization consistent   safety round/check completed   supervised activity  Intervention: Prevent Skin Injury  Recent Flowsheet  Documentation  Taken 12/4/2024 1744 by Safia Mayer RN  Body Position: position changed independently  Goal: Optimal Comfort and Wellbeing  12/4/2024 1851 by Safia Mayer RN  Outcome: Not Progressing  12/4/2024 1849 by Safia Mayer RN  Outcome: Not Progressing  Intervention: Monitor Pain and Promote Comfort  Recent Flowsheet Documentation  Taken 12/4/2024 1811 by Safia Mayer RN  Pain Management Interventions: pain management plan reviewed with patient/caregiver  Taken 12/4/2024 1802 by Safia Mayer RN  Pain Management Interventions: medication (see MAR)  Taken 12/4/2024 1744 by Safia Mayer RN  Pain Management Interventions: pain management plan reviewed with patient/caregiver  Goal: Readiness for Transition of Care  12/4/2024 1851 by Safia Mayer RN  Outcome: Not Progressing  12/4/2024 1849 by Safia Mayer RN  Outcome: Not Progressing  Intervention: Mutually Develop Transition Plan  Recent Flowsheet Documentation  Taken 12/4/2024 1800 by Safia Mayer RN  Equipment Currently Used at Home: none     Problem: Alcohol Withdrawal  Goal: Alcohol Withdrawal Symptom Control  12/4/2024 1851 by Safia Mayer RN  Outcome: Not Progressing  12/4/2024 1849 by Safia Mayer RN  Outcome: Not Progressing  Goal: Optimal Neurologic Function  12/4/2024 1851 by Safia Mayer RN  Outcome: Not Progressing  12/4/2024 1849 by aSfia Mayer RN  Outcome: Not Progressing  Goal: Readiness for Change Identified  12/4/2024 1851 by Safia Mayer RN  Outcome: Not Progressing  12/4/2024 1849 by Safia Mayer RN  Outcome: Not Progressing

## 2024-12-05 NOTE — PLAN OF CARE
"Goal Outcome Evaluation:      Plan of Care Reviewed With: patient, significant other    Overall Patient Progress: no changeOverall Patient Progress: no change    Outcome Evaluation: pt is alert and oriented x4, Seizure precautions maintained, On Tele SR 70S , Neuro checks q 4, oxy 5 , 0.4 diladiud .      Problem: Adult Inpatient Plan of Care  Goal: Plan of Care Review  Description: The Plan of Care Review/Shift note should be completed every shift.  The Outcome Evaluation is a brief statement about your assessment that the patient is improving, declining, or no change.  This information will be displayed automatically on your shift  note.  Outcome: Progressing  Flowsheets (Taken 12/5/2024 9800)  Outcome Evaluation: pt is alert and oriented x4, Seizure precautions maintained, On Tele SR 70S , Neuro checks q 4, oxy 5 , 0.4 diladiud .  Plan of Care Reviewed With:   patient   significant other  Overall Patient Progress: no change  Goal: Patient-Specific Goal (Individualized)  Description: You can add care plan individualizations to a care plan. Examples of Individualization might be:  \"Parent requests to be called daily at 9am for status\", \"I have a hard time hearing out of my right ear\", or \"Do not touch me to wake me up as it startles  me\".  Outcome: Progressing  Goal: Absence of Hospital-Acquired Illness or Injury  Outcome: Progressing  Goal: Optimal Comfort and Wellbeing  Outcome: Progressing  Goal: Readiness for Transition of Care  Outcome: Progressing     Problem: Alcohol Withdrawal  Goal: Alcohol Withdrawal Symptom Control  Outcome: Progressing  Goal: Optimal Neurologic Function  Outcome: Progressing  Goal: Readiness for Change Identified  Outcome: Progressing     Problem: Pain Acute  Goal: Optimal Pain Control and Function  Outcome: Progressing         "

## 2024-12-06 LAB
ANION GAP SERPL CALCULATED.3IONS-SCNC: 11 MMOL/L (ref 7–15)
BUN SERPL-MCNC: 5.1 MG/DL (ref 6–20)
CALCIUM SERPL-MCNC: 8.7 MG/DL (ref 8.8–10.4)
CHLORIDE SERPL-SCNC: 102 MMOL/L (ref 98–107)
CREAT SERPL-MCNC: 0.64 MG/DL (ref 0.67–1.17)
EGFRCR SERPLBLD CKD-EPI 2021: >90 ML/MIN/1.73M2
FOLATE SERPL-MCNC: >40 NG/ML (ref 4.6–34.8)
GLUCOSE SERPL-MCNC: 96 MG/DL (ref 70–99)
HCO3 SERPL-SCNC: 23 MMOL/L (ref 22–29)
HGB BLD-MCNC: 12.3 G/DL (ref 13.3–17.7)
MAGNESIUM SERPL-MCNC: 1.7 MG/DL (ref 1.7–2.3)
POTASSIUM SERPL-SCNC: 4.2 MMOL/L (ref 3.4–5.3)
SODIUM SERPL-SCNC: 136 MMOL/L (ref 135–145)
VIT B12 SERPL-MCNC: 524 PG/ML (ref 232–1245)

## 2024-12-06 PROCEDURE — 82746 ASSAY OF FOLIC ACID SERUM: CPT | Performed by: PSYCHIATRY & NEUROLOGY

## 2024-12-06 PROCEDURE — 36415 COLL VENOUS BLD VENIPUNCTURE: CPT | Performed by: INTERNAL MEDICINE

## 2024-12-06 PROCEDURE — 250N000011 HC RX IP 250 OP 636: Performed by: SURGERY

## 2024-12-06 PROCEDURE — 120N000001 HC R&B MED SURG/OB

## 2024-12-06 PROCEDURE — 80048 BASIC METABOLIC PNL TOTAL CA: CPT | Performed by: INTERNAL MEDICINE

## 2024-12-06 PROCEDURE — 250N000013 HC RX MED GY IP 250 OP 250 PS 637: Performed by: INTERNAL MEDICINE

## 2024-12-06 PROCEDURE — 85018 HEMOGLOBIN: CPT | Performed by: SURGERY

## 2024-12-06 PROCEDURE — 83735 ASSAY OF MAGNESIUM: CPT | Performed by: INTERNAL MEDICINE

## 2024-12-06 PROCEDURE — 84425 ASSAY OF VITAMIN B-1: CPT | Performed by: PSYCHIATRY & NEUROLOGY

## 2024-12-06 PROCEDURE — 250N000013 HC RX MED GY IP 250 OP 250 PS 637: Performed by: SURGERY

## 2024-12-06 PROCEDURE — 99232 SBSQ HOSP IP/OBS MODERATE 35: CPT | Performed by: INTERNAL MEDICINE

## 2024-12-06 PROCEDURE — 82607 VITAMIN B-12: CPT | Performed by: PSYCHIATRY & NEUROLOGY

## 2024-12-06 PROCEDURE — 36415 COLL VENOUS BLD VENIPUNCTURE: CPT | Performed by: PSYCHIATRY & NEUROLOGY

## 2024-12-06 RX ORDER — CYCLOBENZAPRINE HCL 10 MG
10 TABLET ORAL 3 TIMES DAILY
Status: DISCONTINUED | OUTPATIENT
Start: 2024-12-06 | End: 2024-12-06 | Stop reason: ALTCHOICE

## 2024-12-06 RX ORDER — METHOCARBAMOL 500 MG/1
1000 TABLET, FILM COATED ORAL 3 TIMES DAILY
Status: DISCONTINUED | OUTPATIENT
Start: 2024-12-06 | End: 2024-12-07 | Stop reason: HOSPADM

## 2024-12-06 RX ORDER — ACETAMINOPHEN 325 MG/1
975 TABLET ORAL EVERY 8 HOURS
Status: DISCONTINUED | OUTPATIENT
Start: 2024-12-06 | End: 2024-12-07 | Stop reason: HOSPADM

## 2024-12-06 RX ADMIN — METHOCARBAMOL 1000 MG: 500 TABLET ORAL at 15:53

## 2024-12-06 RX ADMIN — CLONIDINE HYDROCHLORIDE 0.1 MG: 0.1 TABLET ORAL at 15:53

## 2024-12-06 RX ADMIN — HYDROMORPHONE HYDROCHLORIDE 0.4 MG: 0.2 INJECTION, SOLUTION INTRAMUSCULAR; INTRAVENOUS; SUBCUTANEOUS at 09:36

## 2024-12-06 RX ADMIN — THIAMINE HCL TAB 100 MG 100 MG: 100 TAB at 09:41

## 2024-12-06 RX ADMIN — OXYCODONE HYDROCHLORIDE 15 MG: 10 TABLET ORAL at 20:33

## 2024-12-06 RX ADMIN — Medication 1 TABLET: at 09:41

## 2024-12-06 RX ADMIN — METHOCARBAMOL 1000 MG: 500 TABLET ORAL at 11:57

## 2024-12-06 RX ADMIN — TRAZODONE HYDROCHLORIDE 200 MG: 100 TABLET ORAL at 23:57

## 2024-12-06 RX ADMIN — CLONIDINE HYDROCHLORIDE 0.1 MG: 0.1 TABLET ORAL at 09:42

## 2024-12-06 RX ADMIN — GABAPENTIN 900 MG: 300 CAPSULE ORAL at 23:57

## 2024-12-06 RX ADMIN — SERTRALINE HYDROCHLORIDE 150 MG: 50 TABLET ORAL at 09:41

## 2024-12-06 RX ADMIN — ACETAMINOPHEN 975 MG: 325 TABLET, FILM COATED ORAL at 20:33

## 2024-12-06 RX ADMIN — GABAPENTIN 900 MG: 300 CAPSULE ORAL at 15:52

## 2024-12-06 RX ADMIN — OXYCODONE HYDROCHLORIDE 15 MG: 10 TABLET ORAL at 15:53

## 2024-12-06 RX ADMIN — METHOCARBAMOL 1000 MG: 500 TABLET ORAL at 20:33

## 2024-12-06 RX ADMIN — ACETAMINOPHEN 975 MG: 325 TABLET, FILM COATED ORAL at 10:55

## 2024-12-06 RX ADMIN — HYDROMORPHONE HYDROCHLORIDE 0.4 MG: 0.2 INJECTION, SOLUTION INTRAMUSCULAR; INTRAVENOUS; SUBCUTANEOUS at 06:57

## 2024-12-06 RX ADMIN — GABAPENTIN 900 MG: 300 CAPSULE ORAL at 09:41

## 2024-12-06 RX ADMIN — CLONIDINE HYDROCHLORIDE 0.1 MG: 0.1 TABLET ORAL at 23:57

## 2024-12-06 RX ADMIN — OXYCODONE HYDROCHLORIDE 10 MG: 10 TABLET ORAL at 10:54

## 2024-12-06 RX ADMIN — FOLIC ACID 1 MG: 1 TABLET ORAL at 09:41

## 2024-12-06 RX ADMIN — AMITRIPTYLINE HYDROCHLORIDE 50 MG: 50 TABLET, FILM COATED ORAL at 23:58

## 2024-12-06 RX ADMIN — PANTOPRAZOLE SODIUM 40 MG: 40 TABLET, DELAYED RELEASE ORAL at 09:41

## 2024-12-06 ASSESSMENT — ACTIVITIES OF DAILY LIVING (ADL)
ADLS_ACUITY_SCORE: 26
ADLS_ACUITY_SCORE: 25
ADLS_ACUITY_SCORE: 26

## 2024-12-06 NOTE — PROGRESS NOTES
Type Of Assessment: Inpatient Substance Use Comprehensive Assessment    Referral Source:  [facility]  Unit Phone: [phone]  MRN: [id number]    DATE OF SERVICE: 2024  Date of previous DEYSI Assessment: N/A  Patient confirmed identity through two factor verification: Full Legal Name and     PATIENT'S NAME: Tello Jones  PREFERRED NAME: Tello  PRONOUNS: he/him  Age: 39 year old  Last 4 SSN: [id number]  Sex: male   Gender Identity: male  Sexual Orientation: Heterosexual  Cultural Background: No, Denies any cultural influences or concerns that need to be considered for treatment  YOB: 1985  Current Address:   [address] [place] [address]  Patient Phone Number:  [phone]   Patient's E-Mail Contact:  [email or web address]  Funding: [email or web address]  PMI: N/A  Emergency Contact: Carol Ann Ross (Significant other) P: [phone]  DAANES information was provided to patient and patient does not want a copy.     Telemedicine Visit: The patient's condition can be safely assessed and treated via synchronous audio and visual telemedicine encounter.    Reason for Telemedicine Visit: Services only offered telehealth  Originating Site (Patient Location): [facility] - [address], [place] [address]   Distant Site (Provider Location): Provider Remote Setting- Home Office  Consent:  The patient/guardian has verbally consented to: the potential risks and benefits of telemedicine (video visit) versus in person care; bill my insurance or make self-payment for services provided; and responsibility for payment of non-covered services.   Mode of Communication:  Video Conference via  [employer]    START TIME: 1500  END TIME: 1540    As the provider I attest to compliance with applicable laws and regulations related to telemedicine.   Tello Jones was seen for a substance use disorder consult on 2024 by Cristian Kearney  "[facility].    Reason for Substance Use Disorder Consult:  Per H&P 12/4/24:  Tello Jones is a 39 year old  male who presented to the ED after seizure at work. Reports no memory of event. He was at work and his coworkers called ambulance. Generalized seizure activity noted for multiple minutes.  Reports heavy drinking but cut back to a couple drinks per day in the last week, last drink was last evening  Complains of left flank pain.  Able to move around ok  Denies shortness of breath, chest pain, abdominal pain, nausea, emesis, dizziness, visual changes, headache, neck pain, extremity pain.        Are you currently having severe withdrawal symptoms that are putting yourself or others in danger? No  Are you currently having severe medical problems that require immediate attention? No  Are you currently having severe emotional or behavioral problems that are putting yourself or others at risk of harm? No    Have you participated in prior substance use disorder evaluations? No   Have you ever been to detox, inpatient or outpatient treatment for substance related use? List previous treatment: No   Have you ever had a gambling problem or had treatment for compulsive gambling? No  Have you ever felt the need to bet more and more money? No  Have you ever had to lie to people important to you about how much you gambled? No    Patient does not appear to be in severe withdrawal, an imminent safety risk to self or others, or requiring immediate medical attention and may proceed with the assessment interview.    Comprehensive Substance Use History   X X = Primary Drug Used Age of First Use    Pattern of Substance Use   (heaviest use in life and a use history within the past year if applicable) (DSM-5: Sx #3) Date /  Quantity of last use if within the past 30 days Withdrawal Potential?   Method of use  (Oral, smoked, snorted, IV, etc)   x Alcohol   15 Pt reports he broke his ribs this past summer \"and didn't have much to " "do\"  Pt reports he was drinking on daily basis, drinking \"a lot, too much\" for 3-4 months  Pt reports \"just after Thanksgiving\" he started to cut back his use and was drinking 1-2 drinks a day 12/3/24 No Oral    Marijuana/Hashish   15 Pt reports trying cannabis when he was younger, denies recent use 15 years ago No Smoked    Cocaine/Crack No use        Meth/Amphetamines   No use        Heroin   No use        Other Opiates/Synthetics   Unsp Pt denies recreational use, only as administered in the hospital by staff for pain management 12/6/24 No Oral    Inhalants  No use        Benzodiazepines   No use        Hallucinogens   No use        Barbiturates/Sedatives/Hypnotics   No use        Over-the-Counter Drugs   No use        Other   No use        Nicotine   No use         Withdrawal symptoms: Have you had any of the following withdrawal symptoms?  Shaky / Jittery / Tremors  Seizures    Have you experienced any cravings?  Yes    Have you had periods of abstinence?  Yes   What was your longest period? \"A week or less\"    Any circumstances that lead to relapse? Stress at work    What activities have you engaged in when using alcohol/other drugs that could be hazardous to you or others?  The patient denied engaging in any of the above dangerous activities when using alcohol and/or drugs.    A description of any risk-taking behavior, including behavior that puts the client at risk of exposure to blood-borne or sexually transmitted diseases: Pt denies    Arrests and legal interventions related to substance use: Pt reports \"I got some minors\" when he was young for underage consumption but denies any other legals issues    A description of how the patient's use affected their ability to function appropriately in a work setting: The patient reported his substance use has negatively impacted his ability to function in a work setting.  The patient reported having decreased performance at work due to his substance use.      A " description of how the patient's use affected their ability to function appropriately in an educational setting: The patient reported his substance use has not negatively impacted his ability to function in a school setting.       Leisure time activities that are associated with substance use: Pt denies    Do you think your substance use has become a problem for you? He agrees he has a substance abuse problem.    MEDICAL HISTORY  Physical or medical concerns or diagnoses:   Patient Active Problem List   Diagnosis    Alcohol withdrawal, uncomplicated (H)    Alcohol-induced acute pancreatitis with uninfected necrosis    Laceration of spleen, initial encounter    Alcohol withdrawal seizure with complication (H)    Closed fracture of transverse process of lumbar vertebra, initial encounter (H)      Do you have any current medical treatment needs not being addressed by inpatient treatment?  Pt denies    Do you need a referral for a medical provider? Pt reports he goes to [facility] in [place] and sees Dr Marrero    Current medications: Patient reports current meds as:   Current Facility-Administered Medications   Medication Dose Route Frequency Provider Last Rate Last Admin    acetaminophen (TYLENOL) tablet 975 mg  975 mg Oral Q8H Davon Gordon MD   975 mg at 12/06/24 1055    amitriptyline (ELAVIL) tablet 50 mg  50 mg Oral At Bedtime Linda Smiley MD   50 mg at 12/05/24 2300    calcium carbonate (TUMS) chewable tablet 1,000 mg  1,000 mg Oral 4x Daily PRN Linda Smiley MD        cloNIDine (CATAPRES) tablet 0.1 mg  0.1 mg Oral Q8H Juan J Altamirano DO   0.1 mg at 12/06/24 0942    flumazenil (ROMAZICON) injection 0.2 mg  0.2 mg Intravenous q1 min prn Juan J Altamirano DO        folic acid (FOLVITE) tablet 1 mg  1 mg Oral Daily Juan J Altamirano DO   1 mg at 12/06/24 0941    [START ON 12/12/2024] gabapentin (NEURONTIN) capsule 100 mg  100 mg Oral Q8H Juan J Altamirano DO        [START ON 12/10/2024]  gabapentin (NEURONTIN) capsule 300 mg  300 mg Oral Q8H Juan J Altamirano DO        [START ON 12/8/2024] gabapentin (NEURONTIN) capsule 600 mg  600 mg Oral Q8H Juan J Altamirano DO        gabapentin (NEURONTIN) capsule 900 mg  900 mg Oral Q8H Juan J Altamirano DO   900 mg at 12/06/24 0941    OLANZapine zydis (zyPREXA) ODT tab 5-10 mg  5-10 mg Oral Q6H PRN Juan J Altamirano DO        Or    haloperidol lactate (HALDOL) injection 2.5-5 mg  2.5-5 mg Intravenous Q6H PRN Juan J Altamirano DO        lidocaine (LMX4) cream   Topical Q1H PRN Linda Smiley MD        lidocaine 1 % 0.1-1 mL  0.1-1 mL Other Q1H PRN Linda Smiley MD        LORazepam (ATIVAN) tablet 1-2 mg  1-2 mg Oral Q30 Min PRN Juan J Altamirano DO        Or    LORazepam (ATIVAN) injection 1-2 mg  1-2 mg Intravenous Q30 Min PRN Juan J Altamirano DO        melatonin tablet 5 mg  5 mg Oral QPM PRN Juan J Altamirano DO        methocarbamol (ROBAXIN) tablet 1,000 mg  1,000 mg Oral TID Davon Gordon MD   1,000 mg at 12/06/24 1157    multivitamin w/minerals (THERA-VIT-M) tablet 1 tablet  1 tablet Oral Daily Juan J Altamirano DO   1 tablet at 12/06/24 0941    naloxone (NARCAN) injection 0.2 mg  0.2 mg Intravenous Q2 Min PRN Linda Smiley MD        Or    naloxone (NARCAN) injection 0.4 mg  0.4 mg Intravenous Q2 Min PRN Linda Smiley MD        Or    naloxone (NARCAN) injection 0.2 mg  0.2 mg Intramuscular Q2 Min PRN Linda Smiley MD        Or    naloxone (NARCAN) injection 0.4 mg  0.4 mg Intramuscular Q2 Min PRN Linda Smiley MD        ondansetron (ZOFRAN ODT) ODT tab 4 mg  4 mg Oral Q6H PRN Linda Smiley MD        Or    ondansetron (ZOFRAN) injection 4 mg  4 mg Intravenous Q6H PRN Linda Smiley MD        oxyCODONE (ROXICODONE) tablet 5 mg  5 mg Oral Q4H PRN Jamil Goode MD        oxyCODONE IR (ROXICODONE) tablet 10 mg  10 mg Oral Q4H PRN Jamil Goode MD   10 mg at 12/06/24 1054    pantoprazole  (PROTONIX) EC tablet 40 mg  40 mg Oral Daily Linda Smiley MD   40 mg at 12/06/24 0941    polyethylene glycol (MIRALAX) Packet 17 g  17 g Oral BID PRLinda Traore MD   17 g at 12/05/24 2017    senna-docusate (SENOKOT-S/PERICOLACE) 8.6-50 MG per tablet 1 tablet  1 tablet Oral BID PRLinda Traore MD   1 tablet at 12/04/24 2345    Or    senna-docusate (SENOKOT-S/PERICOLACE) 8.6-50 MG per tablet 2 tablet  2 tablet Oral BID PRLinda Traore MD   2 tablet at 12/05/24 1730    sertraline (ZOLOFT) tablet 150 mg  150 mg Oral Daily Linda Smiley MD   150 mg at 12/06/24 0941    sodium chloride (PF) 0.9% PF flush 3 mL  3 mL Intracatheter Q8H Linda Smiley MD   3 mL at 12/06/24 0658    sodium chloride (PF) 0.9% PF flush 3 mL  3 mL Intracatheter q1 min prn Linda Smiley MD   3 mL at 12/05/24 1704    thiamine (B-1) tablet 100 mg  100 mg Oral Daily Juan J Altamirano DO   100 mg at 12/06/24 0941    traZODone (DESYREL) tablet 200 mg  200 mg Oral At Bedtime Linda Smiley MD   200 mg at 12/05/24 2300     Are you pregnant? NA, Male    Do you have any specific physical needs/accommodations? No    MENTAL HEALTH HISTORY:  Have you ever had  hospitalizations or treatment for mental health illness: Yes. When, Where, and What circumstances: Pt reports current meds are Rx'd by his PCP Dr Navarrete at [facility]    Mental health history, including diagnosis and symptoms, and the effect on the client's ability to function: Pt reports Sx of anxiety    Current mental health treatment including psychotropic medication needed to maintain stability: (Note: The assessment must utilize screening tools approved by the commissioner pursuant to section 245.4863 to identify whether the client screens positive for co-occurring disorders): Sertraline, trazodone    GAIN-SS Tool:      12/6/2024     3:00 PM   When was the last time that you had significant problems...   with feeling very trapped, lonely, sad, blue,  "depressed or hopeless about the future? Past month   with sleep trouble, such as bad dreams, sleeping restlessly, or falling asleep during the day? Past Month   with feeling very anxious, nervous, tense, scared, panicked or like something bad was going to happen? Past month   with becoming very distressed & upset when something reminded you of the past? 2 to 12 months ago   with thinking about ending your life or committing suicide? Never         12/6/2024     3:00 PM   When was the last time that you did the following things 2 or more times?   Lied or conned to get things you wanted or to avoid having to do something? Past month   Had a hard time paying attention at school, work or home? Past month   Had a hard time listening to instructions at school, work or home? Past month   Were a bully or threatened other people? 1+ years ago   Started physical fights with other people? Never     Have you ever been verbally, emotionally, physically or sexually abused?   The patient denied having any history of being verbally, emotionally, physically or sexually abused.    Family history of substance use and misuse: Pt reports his father and states \"I don't really see him anymore\"    The patient's desire for family involvement in the treatment program: TBD  Level of family support: Pt reports his mother and stepfather and sister are supportive    Social network in relation to expected support for recovery: Pt denies    Are you currently in a significant relationship? Yes.  4B. How long? 3 years            Please describe your significant other's use of mood altering chemicals? Pt denies    Do you have any children (include living arrangements/custody/contact)?:  Pt denies    What is your current living situation? 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 Pt lives in [place] with his girlfriend and their dog Carloz    SUMMARY:  Ability to understand written treatment materials: " Yes  Ability to understand patient rules and patient rights: Yes  Does the patient recognize needs related to substance use and is willing to follow treatment recommendations: Yes  Does the patient have an opioid use disorder:  does not have a history of opiate use.    ASAM Dimension Scale Ratings:  Dimension 1: 1 Client can tolerate and cope with withdrawal discomfort. The client displays mild to moderate intoxication or signs and symptoms interfering with daily functioning but does not immediately endanger self or others. Client poses minimal risk of severe withdrawal.  Dimension 2: 1 Client tolerates and yulia with physical discomfort and is able to get the services that the client needs.  Dimension 3: 2 Client has difficulty with impulse control and lacks coping skills. Client has thoughts of suicide or harm to others without means; however, the thoughts may interfere with participation in some treatment activities. Client has difficulty functioning in significant life areas. Client has moderate symptoms of emotional, behavioral, or cognitive problems. Client is able to participate in most treatment activities.  Dimension 4: 2 Client displays verbal compliance, but lacks consistent behaviors; has low motivation for change; and is passively involved in treatment.  Dimension 5: 3 Client has poor recognition and understanding of relapse and recidivism issues and displays moderately high vulnerability for further substance use or mental health problems. Client has few coping skills and rarely applies coping skills.  Dimension 6: 2 Client is engaged in structured, meaningful activity, but peers, family, significant other, and living environment are unsupportive, or there is criminal justice involvement by the client or among the client's peers, significant others, or in the client's living environment.    Category of Substance Severity (ICD-10 Code / DSM 5 Code)     Alcohol Use Disorder Severe  (10.20) (303.90)    Cannabis Use Disorder The patient does not meet the criteria for a Cannabis use disorder.   Hallucinogen Use Disorder The patient does not meet the criteria for a Hallucinogen use disorder.   Inhalant Use Disorder The patient does not meet the criteria for an Inhalant use disorder.   Opioid Use Disorder The patient does not meet the criteria for an Opioid use disorder.   Sedative, Hypnotic, or Anxiolytic Use Disorder The patient does not meet the criteria for a Sedative/Hypnotic use disorder.   Stimulant Related Disorder The patient does not meet the criteria for a Stimulant use disorder.   Tobacco Use Disorder The patient does not meet the criteria for a Tobacco use disorder.   Other (or unknown) Substance Use Disorder The patient does not meet the criteria for a Other (or unknown) Substance use disorder.     A problematic pattern of alcohol/drug use leading to clinically significant impairment or distress, as manifested by at least two of the following, occurring within a 12-month period:    1.) Alcohol/drug is often taken in larger amounts or over a longer period than was intended.  3.) A great deal of time is spent in activities necessary to obtain alcohol, use alcohol, or recover from its effects.  4.) Craving, or a strong desire or urge to use alcohol/drug  9.) Alcohol/drug use is continued despite knowledge of having a persistent or recurrent physical or psychological problem that is likely to have been caused or exacerbated by alcohol.  10.) Tolerance, as defined by either of the following: A need for markedly increased amounts of alcohol/drug to achieve intoxication or desired effect.  11.) Withdrawal, as manifested by either of the following: The characteristic withdrawal syndrome for alcohol/drug (refer to Criteria A and B of the criteria set for alcohol/drug withdrawal).    Specify if: In early remission:  After full criteria for alcohol/drug use disorder were previously met, none of the criteria for  alcohol/drug use disorder have been met for at least 3 months but for less than 12 months (with the exception that Criterion A4,  Craving or a strong desire or urge to use alcohol/drug  may be met).     In sustained remission:   After full criteria for alcohol use disorder were previously met, non of the criteria for alcohol/drug use disorder have been met at any time during a period of 12 months or longer (with the exception that Criterion A4,  Craving or strong desire or urge to use alcohol/drug  may be met).     Specify if:   This additional specifier is used if the individual is in an environment where access to alcohol is restricted.    Mild: Presence of 2-3 symptoms  Moderate: Presence of 4-5 symptoms  Severe: Presence of 6 or more symptoms    Collateral information: DEYSI Collateral Info: Sufficient information is obtained from the patient to support diagnosis and recommendations. Contact with a collateral sources is not required.    Recommendations:  1)  Complete an Intensive Outpatient CD Treatment Program  2)  Abstain from all mood-altering chemicals unless prescribed by a licensed provider.   3)  Attend, at minimum, 2 weekly support group meetings, such as 12 step based (AA/NA), SMART Recovery, Health Realizations, and/or Refuge Recovery meetings.     4)  Actively work with a mentor/sponsor on a weekly basis.   5)  Follow all the recommendations of your treatment/medical providers.  6)  Patient may benefit from obtaining a full mental health evaluation.    Clinical Substantiation:  Patient would benefit from continuing to develop long-term sober maintenance skills, would benefit from continuing to develop sober coping skills, would benefit from continuing to develop a sober peer support network, has mental health symptoms which are exacerbated by substance abuse, and lacks awareness regarding the disease model of addiction.    Referrals/ Alternatives:  [facility]  Contact: Anna Lira [facility]-    P: [phone]   F: [phone]  E: [email or web address]    [facility] [place]  [address]  [place] [address]  P: [phone]  F: [phone]  E: [email or web address]    [facility]  [address] [place] [address]  P: [phone]  F:  [phone]    [facility]  [address], [address]  [place] [address]  P: [phone]  F: [phone]    DAANES Assessment ID: [id number]     DEYSI consult completed by:   LB Skelton, [facility]  Substance Use Disorder Evaluation Counselor  E-mail Address: [email or web address] [email or web address] [place] [address]     *Due to regulation of Title 42 of the Code of Federal Regulations (CFR) Part 2: Confidentiality laws apply to this note and the information wherein.  Thus, this note cannot be copy and pasted into any other health care staff's note nor can it be included in general medical records sent to ANY outside agency without the patient's written consent.

## 2024-12-06 NOTE — PROGRESS NOTES
[facility]    Medicine Progress Note - Hospitalist Service    Date of Admission:  12/4/2024    Assessment & Plan   Tello Jones is a 39 year old male admitted on 12/4/2024 after having an Etoh withdrawal seizure. He had initially been admitted by Gen Surgery due to trauma that occurred with his LOC and fall. He had been found to have Grade II splenic laceration and Left transverse process fractures of T12, L1 and L2.     PMH is notable for Etoh abuse.     DX:  Etoh withdrawal sz. Has not required Benzo since initial eval in ED on 12/4.  Trauma related to LOC and fall.   Chronic etoh dependence   PLAN:  Monitor Hgb per Gen Surgery.   Stop IV narcotic pain meds. Unable to get NSAIDs until tomorrow (due to bleeding risk).  Increased available Oxycodone to 15 mg/7.5 mg q 4 hours prn. Schedule acetaminophen and methocarbamol.  Discussed possible options of Naltrexone, Acamprosate and Disulfiram. Already on Sertraline 150 mg daily, takes Amitriptyline 50 mg with Trazodone 200 mg at bedtime.  Thinks he did not tolerate Naltrexone in the past.  Met with Chem dep today. Was given several referrals/alternatives from which to choose.   Neurology consult for eval of sz and for info about restriction of driving.           Diet: Combination Diet Regular Diet Adult    DVT Prophylaxis: Pneumatic Compression Devices  Theodore Catheter: Not present  Lines: None     Cardiac Monitoring: None  Code Status: Full Code      Clinically Significant Risk Factors           # Hypocalcemia: Lowest Ca = 8.6 mg/dL in last 2 days, will monitor and replace as appropriate                               Social Drivers of Health    Interpersonal Safety: High Risk (12/4/2024)    Interpersonal Safety     Do you feel physically and emotionally safe where you currently live?: No     Within the past 12 months, have you been hit, slapped, kicked or otherwise physically hurt by someone?: No     Within the past 12 months, have you been  humiliated or emotionally abused in other ways by your partner or ex-partner?: No          Disposition Plan     Medically Ready for Discharge: Anticipated Tomorrow             Davon Gordon MD  Hospitalist Service  [facility]  Securely message with [employer] (more info)  Text page via [employer] Paging/Directory   ______________________________________________________________________    Interval History   Pt continues to complain of pain. This seems more significant than yesterday. I attempted to reassure him.      Physical Exam   Vital Signs: Temp: 98  F (36.7  C) Temp src: Temporal BP: 99/62 Pulse: 73   Resp: 16 SpO2: 98 % O2 Device: None (Room air)    Weight: 186 lbs 4.8 oz    General Appearance: Alert, coherent in NAD. No tremor.   Respiratory: no increased WOB.  CTA  Cardiovascular: RRR without murmur  GI: soft, NTND.  Mod tender over left flank.  Skin: no lesions. No jaundice  Other:      Medical Decision Making       45 MINUTES SPENT BY ME on the date of service doing chart review, history, exam, documentation & further activities per the note.      Data   Results for orders placed or performed during the hospital encounter of 12/04/24 (from the past 24 hours)   Hemoglobin   Result Value Ref Range    Hemoglobin 11.9 (L) 13.3 - 17.7 g/dL   Hemoglobin   Result Value Ref Range    Hemoglobin 12.3 (L) 13.3 - 17.7 g/dL   Magnesium   Result Value Ref Range    Magnesium 1.7 1.7 - 2.3 mg/dL   Basic metabolic panel   Result Value Ref Range    Sodium 136 135 - 145 mmol/L    Potassium 4.2 3.4 - 5.3 mmol/L    Chloride 102 98 - 107 mmol/L    Carbon Dioxide (CO2) 23 22 - 29 mmol/L    Anion Gap 11 7 - 15 mmol/L    Urea Nitrogen 5.1 (L) 6.0 - 20.0 mg/dL    Creatinine 0.64 (L) 0.67 - 1.17 mg/dL    GFR Estimate >90 >60 mL/min/1.73m2    Calcium 8.7 (L) 8.8 - 10.4 mg/dL    Glucose 96 70 - 99 mg/dL   Folate   Result Value Ref Range    Folic Acid >40.0 (H) 4.6 - 34.8 ng/mL   EEG Awake or Drowsy Routine    Narrative     EEG Awake or Drowsy Routine Result    EEG DATE: 24  EEG [id number]  EEG DAY#: 0  EEG SOURCE FILE DURATION: 25 minutes    PATIENT INFORMATION: Tello Jones is a 39 year old year old male who   presents with seizure. EEG is being done to evaluate for monitoring for   seizures.     MEDICATIONS: no  These medications and doses were derived from the medical record at the   time of this procedure.    TECHNICAL SUMMARY: This routine EEG was recorded from 23 scalp electrodes   placed according to the 10-20 international system. Additional electrodes   were used for referencing, EKG, and to record from other cerebral regions   as appropriate.    BACKGROUND ACTIVITY:  During wakefulness, the background activity consists   of synchronous and symmetric, well-modulated, 9 Hz posterior dominant   rhythm. The posterior dominant rhythm attenuated with eye opening.  Photic   stimulation performed at different flash with consistent no photic   Response was noted hyperventilation was performed physiological slowing   was noted.      IMPRESSION:   This is a normal awake electroencephalogram. No electrographic seizures or   epileptiform discharges were recorded.     Lona Wright MD

## 2024-12-06 NOTE — PLAN OF CARE
Goal Outcome Evaluation:    Plan of Care Reviewed With: patient, significant other    Overall Patient Progress: no change    Outcome Evaluation: Seizure precautions maintained. CIWA scores of 3,5,6. Chem dep following. Severe pain not managed with available medications - may need pain team consulted prior to discharge.    Problem: Adult Inpatient Plan of Care  Goal: Plan of Care Review  12/5/2024 1838 by Safia Mayer RN  Outcome: Not Progressing  Flowsheets (Taken 12/5/2024 1838)  Outcome Evaluation: Seizure precautions maintained. CIWA scores of 3,5,6. Chem dep following. Severe pain not managed with available medications - may need pain team consulted prior to discharge.  Plan of Care Reviewed With:   patient   significant other  Overall Patient Progress: no change  12/5/2024 1343 by Safia Mayer RN  Outcome: Not Progressing  Goal: Patient-Specific Goal (Individualized)  12/5/2024 1838 by Safia Mayer RN  Outcome: Not Progressing  12/5/2024 1343 by Safia Mayer RN  Outcome: Not Progressing  Goal: Absence of Hospital-Acquired Illness or Injury  12/5/2024 1838 by Safia Mayer RN  Outcome: Not Progressing  12/5/2024 1343 by Safia Mayer RN  Outcome: Not Progressing  Intervention: Identify and Manage Fall Risk  Recent Flowsheet Documentation  Taken 12/5/2024 0811 by Safia Mayer RN  Safety Promotion/Fall Prevention:   activity supervised   assistive device/personal items within reach   clutter free environment maintained   increased rounding and observation   increase visualization of patient   lighting adjusted   mobility aid in reach   nonskid shoes/slippers when out of bed   patient and family education   room organization consistent   safety round/check completed   supervised activity  Intervention: Prevent Skin Injury  Recent Flowsheet Documentation  Taken 12/5/2024 0811 by Safia Mayer RN  Body Position: position changed independently  Goal: Optimal Comfort and  Wellbeing  12/5/2024 1838 by Safia Mayer RN  Outcome: Not Progressing  12/5/2024 1343 by Safia Mayer RN  Outcome: Not Progressing  Intervention: Monitor Pain and Promote Comfort  Recent Flowsheet Documentation  Taken 12/5/2024 1805 by Safia Mayer RN  Pain Management Interventions: pain management plan reviewed with patient/caregiver  Taken 12/5/2024 1726 by Safia Mayer RN  Pain Management Interventions: pain management plan reviewed with patient/caregiver  Taken 12/5/2024 1527 by Safia Mayer RN  Pain Management Interventions: pain management plan reviewed with patient/caregiver  Taken 12/5/2024 1445 by Safia Mayer RN  Pain Management Interventions: pain management plan reviewed with patient/caregiver  Taken 12/5/2024 1334 by Safia Mayer RN  Pain Management Interventions:   pain management plan reviewed with patient/caregiver   around-the-clock dosing utilized  Taken 12/5/2024 1322 by Safia Mayer RN  Pain Management Interventions: medication (see MAR)  Taken 12/5/2024 1113 by Safia Mayer RN  Pain Management Interventions:   pain management plan reviewed with patient/caregiver   pillow support provided  Taken 12/5/2024 1031 by Safia Mayer RN  Pain Management Interventions: medication (see MAR)  Taken 12/5/2024 0859 by Safia Mayer RN  Pain Management Interventions:   cold applied   repositioned   relaxation techniques promoted   rest  Taken 12/5/2024 0828 by Safia Mayer RN  Pain Management Interventions: pain management plan reviewed with patient/caregiver  Taken 12/5/2024 0816 by Safia Mayer RN  Pain Management Interventions:   medication (see MAR)   cold applied   emotional support   pillow support provided   quiet environment facilitated   rest   repositioned   relaxation techniques promoted  Goal: Readiness for Transition of Care  12/5/2024 1838 by Safia Mayer RN  Outcome: Not Progressing  12/5/2024 1343 by Safia Mayer  RN  Outcome: Not Progressing     Problem: Alcohol Withdrawal  Goal: Alcohol Withdrawal Symptom Control  12/5/2024 1838 by Safia Mayer RN  Outcome: Not Progressing  12/5/2024 1343 by Safia Mayer RN  Outcome: Not Progressing  Goal: Optimal Neurologic Function  12/5/2024 1838 by Safia Mayer RN  Outcome: Not Progressing  12/5/2024 1343 by Safia Mayer RN  Outcome: Not Progressing  Goal: Readiness for Change Identified  12/5/2024 1838 by Safia Mayer RN  Outcome: Not Progressing  12/5/2024 1343 by Safia Mayer RN  Outcome: Not Progressing     Problem: Pain Acute  Goal: Optimal Pain Control and Function  12/5/2024 1838 by Safia Mayer RN  Outcome: Not Progressing  12/5/2024 1343 by Safia Mayer RN  Outcome: Not Progressing  Intervention: Develop Pain Management Plan  Recent Flowsheet Documentation  Taken 12/5/2024 1805 by Safia Mayer RN  Pain Management Interventions: pain management plan reviewed with patient/caregiver  Taken 12/5/2024 1726 by Safia Mayer RN  Pain Management Interventions: pain management plan reviewed with patient/caregiver  Taken 12/5/2024 1527 by Safia Mayer RN  Pain Management Interventions: pain management plan reviewed with patient/caregiver  Taken 12/5/2024 1445 by Safia Mayer RN  Pain Management Interventions: pain management plan reviewed with patient/caregiver  Taken 12/5/2024 1334 by Safia Mayer RN  Pain Management Interventions:   pain management plan reviewed with patient/caregiver   around-the-clock dosing utilized  Taken 12/5/2024 1322 by Safia Mayer RN  Pain Management Interventions: medication (see MAR)  Taken 12/5/2024 1113 by Safia Mayer RN  Pain Management Interventions:   pain management plan reviewed with patient/caregiver   pillow support provided  Taken 12/5/2024 1031 by Safia Mayer RN  Pain Management Interventions: medication (see MAR)  Taken 12/5/2024 0859 by Safia Mayer RN  Pain  Management Interventions:   cold applied   repositioned   relaxation techniques promoted   rest  Taken 12/5/2024 0828 by Safia Mayer, RN  Pain Management Interventions: pain management plan reviewed with patient/caregiver  Taken 12/5/2024 0816 by Safia Mayer, RN  Pain Management Interventions:   medication (see MAR)   cold applied   emotional support   pillow support provided   quiet environment facilitated   rest   repositioned   relaxation techniques promoted

## 2024-12-06 NOTE — CONSULTS
Met with pt for CD Consult and completed DEYSI Comprehensive Assessment.    Sending referral to  [facility] via [employer], faxing referrals to [facility], [facility], and [facility] per pt's verbal consent, faxing ROIs to unit for pt signature.  Sending pt contact info for programs they are being referred to along with community resources via secure e-mail to [email or web address] and adding to pt's AVS.     Recommendations:  1)  Complete an Intensive Outpatient CD Treatment Program  2)  Abstain from all mood-altering chemicals unless prescribed by a licensed provider.   3)  Attend, at minimum, 2 weekly support group meetings, such as 12 step based (AA/NA), SMART Recovery, Health Realizations, and/or Refuge Recovery meetings.     4)  Actively work with a mentor/sponsor on a weekly basis.   5)  Follow all the recommendations of your treatment/medical providers.  6)  Patient may benefit from obtaining a full mental health evaluation.    Clinical Substantiation:  Patient would benefit from continuing to develop long-term sober maintenance skills, would benefit from continuing to develop sober coping skills, would benefit from continuing to develop a sober peer support network, has mental health symptoms which are exacerbated by substance abuse, and lacks awareness regarding the disease model of addiction.    Referrals/ Alternatives:  [facility]  Contact: [facility]-    P: [phone]   F: [phone]  E: [email or web address] [address]  P: [phone]  F: [phone]  E: [email or web address]    [facility]  [address] [place] [address]  P: [phone]  F:  [phone]    [facility]  [address]  [place] [address]  P: [phone]  F: [phone]    DAANES Assessment ID: [id number]     DEYSI consult completed by:   LB Skelton, [facility]  Substance Use  Disorder Evaluation Counselor  E-mail Address: [email or web address] and Addiction Services Consult & [place] [address]

## 2024-12-06 NOTE — CONSULTS
DATE OF SERVICE : 12/6/2024    DATE OF ADMISSION: 12/4/2024    NEUROLOGICAL CONSULTATION    REQUESTED BY Dr. Pike att. providers found    SOURCE OF INFORMATION:Patient and  EHR    REASON FOR CONSULTATION: Alcohol use disorder, admitted status post fall due to secondary alcohol withdrawal seizure    HISTORY OF PRESENT ILLNESS:     He is a 39 years old man with past medical history significant for alcohol use disorder, depression, GERD presenting with witnessed seizure occurred at work.  3-minute minute long episode with associated lip biting, he has lack of memory of episode.  Alcohol use bottle of hard liquor on most days trying to back down on alcohol.    Patient workup normal white count,  Elevated MCV, normal sodium.  Elevated AST/ALT.  Blood alcohol is negative  CT head no acute pathology  CT abdomen and pelvis acute appearing fracture left transverse process T12-L1 and L2 small splenium laceration.  Diffuse hepatic steatosis.  Sequelae of prior pancreatitis.  Enlarged left adrenal nodule    Patient does reports earlier in July he might had episode of fainting/seizure  Medications Prior to Admission   Medication Sig Dispense Refill Last Dose/Taking    amitriptyline (ELAVIL) 50 MG tablet Take 50 mg by mouth at bedtime.   12/3/2024    cetirizine (ZYRTEC) 10 MG tablet Take 10 mg by mouth daily as needed.   More than a month    multivitamin (CENTRUM SILVER) tablet Take 1 tablet by mouth daily   Past Month    pantoprazole (PROTONIX) 40 MG EC tablet Take 40 mg by mouth daily.   12/4/2024 Morning    sertraline (ZOLOFT) 100 MG tablet Take 150 mg by mouth daily.   12/4/2024 Morning    traZODone (DESYREL) 100 MG tablet Take 200 mg by mouth at bedtime.   12/3/2024     Current Facility-Administered Medications   Medication Dose Route Frequency Provider Last Rate Last Admin    acetaminophen (TYLENOL) tablet 975 mg  975 mg Oral Q8H Davon Gordon MD   975 mg at 12/06/24 1055    amitriptyline (ELAVIL) tablet 50 mg  50 mg  Oral At Bedtime Linda Smiley MD   50 mg at 12/05/24 2300    calcium carbonate (TUMS) chewable tablet 1,000 mg  1,000 mg Oral 4x Daily PRN Linda Smiley MD        cloNIDine (CATAPRES) tablet 0.1 mg  0.1 mg Oral Q8H Juan J Altamirano, DO   0.1 mg at 12/06/24 0942    flumazenil (ROMAZICON) injection 0.2 mg  0.2 mg Intravenous q1 min prn Juan J Altamirano DO        folic acid (FOLVITE) tablet 1 mg  1 mg Oral Daily Juan J Altamirano, DO   1 mg at 12/06/24 0941    [START ON 12/12/2024] gabapentin (NEURONTIN) capsule 100 mg  100 mg Oral Q8H Juan J Altamirano, DO        [START ON 12/10/2024] gabapentin (NEURONTIN) capsule 300 mg  300 mg Oral Q8H Robertleruss, Juan J CRAFT DO        [START ON 12/8/2024] gabapentin (NEURONTIN) capsule 600 mg  600 mg Oral Q8H Robertleruss, Juan J CRAFT, DO        gabapentin (NEURONTIN) capsule 900 mg  900 mg Oral Q8H Juan J Altamirano DO   900 mg at 12/06/24 0941    OLANZapine zydis (zyPREXA) ODT tab 5-10 mg  5-10 mg Oral Q6H PRN Juan J Altamirano DO        Or    haloperidol lactate (HALDOL) injection 2.5-5 mg  2.5-5 mg Intravenous Q6H PRN Juan J Altamirano DO        lidocaine (LMX4) cream   Topical Q1H PRN Linda Smiley MD        lidocaine 1 % 0.1-1 mL  0.1-1 mL Other Q1H PRN Linda Smiley MD        LORazepam (ATIVAN) tablet 1-2 mg  1-2 mg Oral Q30 Min PRN Juan J Altamirano DO        Or    LORazepam (ATIVAN) injection 1-2 mg  1-2 mg Intravenous Q30 Min PRN Juan J Altamirano,         melatonin tablet 5 mg  5 mg Oral QPM PRN Juan J Altamirano DO        methocarbamol (ROBAXIN) tablet 1,000 mg  1,000 mg Oral TID Parens, Davon R, MD   1,000 mg at 12/06/24 1157    multivitamin w/minerals (THERA-VIT-M) tablet 1 tablet  1 tablet Oral Daily Juan J Altamirano DO   1 tablet at 12/06/24 0941    naloxone (NARCAN) injection 0.2 mg  0.2 mg Intravenous Q2 Min PRN Linda Smiley MD        Or    naloxone (NARCAN) injection 0.4 mg  0.4 mg Intravenous Q2 Min PRN Baljit,  MD Linda        Or    naloxone (NARCAN) injection 0.2 mg  0.2 mg Intramuscular Q2 Min PRN Linda Smiley MD        Or    naloxone (NARCAN) injection 0.4 mg  0.4 mg Intramuscular Q2 Min PRLinda Traore MD        ondansetron (ZOFRAN ODT) ODT tab 4 mg  4 mg Oral Q6H PRN Linda Smiley MD        Or    ondansetron (ZOFRAN) injection 4 mg  4 mg Intravenous Q6H PRLinda Traore MD        oxyCODONE (ROXICODONE) tablet 5 mg  5 mg Oral Q4H PRN Jamil Goode MD        oxyCODONE IR (ROXICODONE) tablet 10 mg  10 mg Oral Q4H PRN Jamil Goode MD   10 mg at 12/06/24 1054    pantoprazole (PROTONIX) EC tablet 40 mg  40 mg Oral Daily Linda Smiley MD   40 mg at 12/06/24 0941    polyethylene glycol (MIRALAX) Packet 17 g  17 g Oral BID PRN Linda Smiley MD   17 g at 12/05/24 2017    senna-docusate (SENOKOT-S/PERICOLACE) 8.6-50 MG per tablet 1 tablet  1 tablet Oral BID PRLinda Traore MD   1 tablet at 12/04/24 2345    Or    senna-docusate (SENOKOT-S/PERICOLACE) 8.6-50 MG per tablet 2 tablet  2 tablet Oral BID PRN Linda Smiley MD   2 tablet at 12/05/24 1730    sertraline (ZOLOFT) tablet 150 mg  150 mg Oral Daily Linda Smiley MD   150 mg at 12/06/24 0941    sodium chloride (PF) 0.9% PF flush 3 mL  3 mL Intracatheter Q8H Linda Smiley MD   3 mL at 12/06/24 0658    sodium chloride (PF) 0.9% PF flush 3 mL  3 mL Intracatheter q1 min prn Linda Smiley MD   3 mL at 12/05/24 1704    thiamine (B-1) tablet 100 mg  100 mg Oral Daily Juan J Altamirano DO   100 mg at 12/06/24 0941    traZODone (DESYREL) tablet 200 mg  200 mg Oral At Bedtime Linda Smiley MD   200 mg at 12/05/24 2300     Current Facility-Administered Medications   Medication Dose Route Frequency Provider Last Rate Last Admin    acetaminophen (TYLENOL) tablet 975 mg  975 mg Oral Q8H Davon Gordon MD   975 mg at 12/06/24 1055    amitriptyline (ELAVIL) tablet 50 mg  50 mg Oral At Bedtime Linda Smiley MD   50 mg  at 12/05/24 2300    calcium carbonate (TUMS) chewable tablet 1,000 mg  1,000 mg Oral 4x Daily PRN Linda Smiley MD        cloNIDine (CATAPRES) tablet 0.1 mg  0.1 mg Oral Q8H Juan J Altamirano DO   0.1 mg at 12/06/24 0942    flumazenil (ROMAZICON) injection 0.2 mg  0.2 mg Intravenous q1 min prn Juan J Altamirano DO        folic acid (FOLVITE) tablet 1 mg  1 mg Oral Daily Juan J Altamirano DO   1 mg at 12/06/24 0941    [START ON 12/12/2024] gabapentin (NEURONTIN) capsule 100 mg  100 mg Oral Q8H Juan J Altamirano DO        [START ON 12/10/2024] gabapentin (NEURONTIN) capsule 300 mg  300 mg Oral Q8H Evelia, Juan J CRAFT, DO        [START ON 12/8/2024] gabapentin (NEURONTIN) capsule 600 mg  600 mg Oral Q8H Juan J Altamirano DO        gabapentin (NEURONTIN) capsule 900 mg  900 mg Oral Q8H Juan J Altamirano DO   900 mg at 12/06/24 0941    OLANZapine zydis (zyPREXA) ODT tab 5-10 mg  5-10 mg Oral Q6H PRN Juan J Altamirano DO        Or    haloperidol lactate (HALDOL) injection 2.5-5 mg  2.5-5 mg Intravenous Q6H PRN Juan J Altamirano DO        lidocaine (LMX4) cream   Topical Q1H PRN Linda Smiley MD        lidocaine 1 % 0.1-1 mL  0.1-1 mL Other Q1H PRN Linda Smiley MD        LORazepam (ATIVAN) tablet 1-2 mg  1-2 mg Oral Q30 Min PRN Juan J Altamirano DO        Or    LORazepam (ATIVAN) injection 1-2 mg  1-2 mg Intravenous Q30 Min PRN Juan J Altamirano DO        melatonin tablet 5 mg  5 mg Oral QPM PRN Juan J Altamirano DO        methocarbamol (ROBAXIN) tablet 1,000 mg  1,000 mg Oral TID Davon Gordon MD   1,000 mg at 12/06/24 1157    multivitamin w/minerals (THERA-VIT-M) tablet 1 tablet  1 tablet Oral Daily Juan J Altamirano DO   1 tablet at 12/06/24 0915    naloxone (NARCAN) injection 0.2 mg  0.2 mg Intravenous Q2 Min PRN Linda Smiley MD        Or    naloxone (NARCAN) injection 0.4 mg  0.4 mg Intravenous Q2 Min PRN Linda Smiley MD        Or    naloxone (NARCAN)  injection 0.2 mg  0.2 mg Intramuscular Q2 Min PRLinda Traore MD        Or    naloxone (NARCAN) injection 0.4 mg  0.4 mg Intramuscular Q2 Min PRLinda Traore MD        ondansetron (ZOFRAN ODT) ODT tab 4 mg  4 mg Oral Q6H PRN Linda Smiley MD        Or    ondansetron (ZOFRAN) injection 4 mg  4 mg Intravenous Q6H PRN Linda Smiley MD        oxyCODONE (ROXICODONE) tablet 5 mg  5 mg Oral Q4H PRN Jamil Goode MD        oxyCODONE IR (ROXICODONE) tablet 10 mg  10 mg Oral Q4H PRN Jamil Goode MD   10 mg at 12/06/24 1054    pantoprazole (PROTONIX) EC tablet 40 mg  40 mg Oral Daily Linda Smiley MD   40 mg at 12/06/24 0941    polyethylene glycol (MIRALAX) Packet 17 g  17 g Oral BID PRN Linda Smiley MD   17 g at 12/05/24 2017    senna-docusate (SENOKOT-S/PERICOLACE) 8.6-50 MG per tablet 1 tablet  1 tablet Oral BID PRN Linda Smiley MD   1 tablet at 12/04/24 2345    Or    senna-docusate (SENOKOT-S/PERICOLACE) 8.6-50 MG per tablet 2 tablet  2 tablet Oral BID Linda Barrera MD   2 tablet at 12/05/24 1730    sertraline (ZOLOFT) tablet 150 mg  150 mg Oral Daily Linda Smiley MD   150 mg at 12/06/24 0941    sodium chloride (PF) 0.9% PF flush 3 mL  3 mL Intracatheter Q8H Linda Smiley MD   3 mL at 12/06/24 0658    sodium chloride (PF) 0.9% PF flush 3 mL  3 mL Intracatheter q1 min prn Linda Smiley MD   3 mL at 12/05/24 1704    thiamine (B-1) tablet 100 mg  100 mg Oral Daily Juan J Altamirano DO   100 mg at 12/06/24 0941    traZODone (DESYREL) tablet 200 mg  200 mg Oral At Bedtime Linda Smiley MD   200 mg at 12/05/24 2300              Allergies   Allergen Reactions    Cefaclor Rash         PHYSICAL EXAM    /73 (BP Location: Right arm)   Pulse 79   Temp 98.3  F (36.8  C) (Temporal)   Resp 18   Wt 84.5 kg (186 lb 4.8 oz)   SpO2 92%   BMI 25.27 kg/m      No acute distress no labored breathing normal mood sitting in a chair  Alert and oriented to current  "situations fund of knowledge is intact spontaneous speech and compression is normal no dysarthria  Pupils equal and round reacting to light extraocular movements are intact face is symmetric hearing normal to conversation tongue movements were normal laceration in the lower inner aspect of the lip  Able to move all 4 limbs against gravity.  Reflexes 2+ upper limbs 3 at the knees 2+ at the ankles plantars are equivocal    Lab and X-ray:   Recent Labs   Lab Test 12/06/24  0724 12/04/24  1726 12/04/24  1054   WBC  --   --  6.8   HGB 12.3*   < > 13.8   PLT  --   --  181   INR  --   --  1.07   POTASSIUM 4.2   < > 3.6    < > = values in this interval not displayed.     Recent Labs   Lab Test 12/06/24  0724 12/05/24  0617   POTASSIUM 4.2 3.5   CHLORIDE 102 101   BUN 5.1* 7.5     Recent Labs   Lab Test 12/06/24  0724 12/05/24  2131 12/04/24  1726 12/04/24  1054 09/11/24  2035 09/11/24  1820   WBC  --   --   --  6.8  --  8.9   HGB 12.3* 11.9*   < > 13.8  --  14.0   MCV  --   --   --  101*  --  100   PLT  --   --   --  181  --  241   INR  --   --   --  1.07 1.16*  --     < > = values in this interval not displayed.     Recent Labs   Lab Test 12/05/24  0617 12/04/24  1054   AST 98* 232*   ALT 68 104*   ALKPHOS 64 84     No lab results found.    Invalid input(s): \"CHOLESTEROL\", \"TRIGLYCERIDES\"  No lab results found.    Laboratory results were personally interpreted and reviewed in detail.  Imaging studies reviewed and interpreted in detail      Summary: List Problems:   Patient Active Problem List   Diagnosis    Alcohol withdrawal, uncomplicated (H)    Alcohol-induced acute pancreatitis with uninfected necrosis    Laceration of spleen, initial encounter    Alcohol withdrawal seizure with complication (H)    Closed fracture of transverse process of lumbar vertebra, initial encounter (H)       ASSESSMENT:     History of alcohol dependence presenting with seizure like activity -no recurrence during the course of hospital stay he " is doing good    Likely related to provoked seizure in the setting of alcohol dependence    Plan to consider EEG    If EEG is unrevealing no plan for antiepileptics    Abstain from alcohol use.  No driving(patient work involves driving please consider  versus other resources for other alternatives)    thiamine vitamin B12 and folate-  pending     Thank you for the opportunity to provide consultation on Tello Jones

## 2024-12-06 NOTE — PROGRESS NOTES
Surgery progress note:     Still having quite a bit of pain in the left upper back. This morning was pretty bad but the pain medications have helped and he feels better this late morning. Tolerating diet.     /73 (BP Location: Right arm)   Pulse 79   Temp 98.3  F (36.8  C) (Temporal)   Resp 18   Wt 84.5 kg (186 lb 4.8 oz)   SpO2 92%   BMI 25.27 kg/m    On exam, he appears well and is not in distress, abdomen not distended and mild LUQ tenderness.     Hb levels have been stable     A/P:   -remain in hospital due to ongoing pain  -will add muscle relaxant   -can stop serial Hb checks and re-check Hb tomorrow   -neurology consult for seizures pending   -possible discharge tomorrow     Samuel Ferrer MD

## 2024-12-06 NOTE — PLAN OF CARE
"Goal Outcome Evaluation:      Plan of Care Reviewed With: patient, significant other    Overall Patient Progress: improvingOverall Patient Progress: improving    Outcome Evaluation: Pt AOx4. RA. Tele - SR per tele tech. Seizure precautions maintained. S/O at bedside. CIWA scores - 0,2. Pain managed with oxy and IV dilaudid. Discharge TBD.      Problem: Adult Inpatient Plan of Care  Goal: Plan of Care Review  Description: The Plan of Care Review/Shift note should be completed every shift.  The Outcome Evaluation is a brief statement about your assessment that the patient is improving, declining, or no change.  This information will be displayed automatically on your shift  note.  Outcome: Progressing  Flowsheets (Taken 12/6/2024 0612)  Outcome Evaluation: Pt AOx4. RA. Tele - SR per tele tech. Seizure precautions maintained. S/O at bedside. CIWA scores - 0,2. Pain managed with oxy and IV dilaudid. Discharge TBD.  Plan of Care Reviewed With:   patient   significant other  Overall Patient Progress: improving  Goal: Patient-Specific Goal (Individualized)  Description: You can add care plan individualizations to a care plan. Examples of Individualization might be:  \"Parent requests to be called daily at 9am for status\", \"I have a hard time hearing out of my right ear\", or \"Do not touch me to wake me up as it startles  me\".  Outcome: Progressing  Goal: Absence of Hospital-Acquired Illness or Injury  Outcome: Progressing  Intervention: Identify and Manage Fall Risk  Recent Flowsheet Documentation  Taken 12/5/2024 2306 by Abi Gomez, RN  Safety Promotion/Fall Prevention:   activity supervised   clutter free environment maintained   mobility aid in reach   nonskid shoes/slippers when out of bed   room near nurse's station   safety round/check completed  Intervention: Prevent Skin Injury  Recent Flowsheet Documentation  Taken 12/5/2024 2306 by Abi Gomez, RN  Body Position: position changed " independently  Skin Protection: adhesive use limited  Intervention: Prevent and Manage VTE (Venous Thromboembolism) Risk  Recent Flowsheet Documentation  Taken 12/5/2024 2306 by Abi Gomez RN  VTE Prevention/Management: SCDs off (sequential compression devices)  Intervention: Prevent Infection  Recent Flowsheet Documentation  Taken 12/5/2024 2306 by Abi Gomez RN  Infection Prevention:   single patient room provided   rest/sleep promoted   hand hygiene promoted  Goal: Optimal Comfort and Wellbeing  Outcome: Progressing  Intervention: Monitor Pain and Promote Comfort  Recent Flowsheet Documentation  Taken 12/5/2024 2301 by Abi Gomez RN  Pain Management Interventions: medication (see MAR)  Goal: Readiness for Transition of Care  Outcome: Progressing     Problem: Alcohol Withdrawal  Goal: Alcohol Withdrawal Symptom Control  Outcome: Progressing  Intervention: Minimize or Manage Alcohol Withdrawal Symptoms  Recent Flowsheet Documentation  Taken 12/5/2024 2306 by Abi Gomez RN  Seizure Precautions:   side rails padded   clutter-free environment maintained  Goal: Optimal Neurologic Function  Outcome: Progressing  Intervention: Prevent Seizure-Related Injury  Recent Flowsheet Documentation  Taken 12/5/2024 2306 by Abi Gomez RN  Seizure Precautions:   side rails padded   clutter-free environment maintained  Goal: Readiness for Change Identified  Outcome: Progressing     Problem: Pain Acute  Goal: Optimal Pain Control and Function  Outcome: Progressing  Intervention: Develop Pain Management Plan  Recent Flowsheet Documentation  Taken 12/5/2024 2301 by Abi Gomez RN  Pain Management Interventions: medication (see MAR)  Intervention: Prevent or Manage Pain  Recent Flowsheet Documentation  Taken 12/5/2024 2306 by Abi Gomez RN  Bowel Elimination Promotion:   adequate fluid intake promoted   ambulation promoted  Medication  Review/Management: medications reviewed

## 2024-12-07 VITALS
WEIGHT: 186.3 LBS | HEART RATE: 74 BPM | RESPIRATION RATE: 18 BRPM | BODY MASS INDEX: 25.27 KG/M2 | TEMPERATURE: 98.3 F | OXYGEN SATURATION: 95 % | DIASTOLIC BLOOD PRESSURE: 87 MMHG | SYSTOLIC BLOOD PRESSURE: 119 MMHG

## 2024-12-07 LAB
ERYTHROCYTE [DISTWIDTH] IN BLOOD BY AUTOMATED COUNT: 12.4 % (ref 10–15)
HCT VFR BLD AUTO: 36.8 % (ref 40–53)
HGB BLD-MCNC: 12.5 G/DL (ref 13.3–17.7)
MAGNESIUM SERPL-MCNC: 1.6 MG/DL (ref 1.7–2.3)
MCH RBC QN AUTO: 34.3 PG (ref 26.5–33)
MCHC RBC AUTO-ENTMCNC: 34 G/DL (ref 31.5–36.5)
MCV RBC AUTO: 101 FL (ref 78–100)
PLATELET # BLD AUTO: 149 10E3/UL (ref 150–450)
POTASSIUM SERPL-SCNC: 4 MMOL/L (ref 3.4–5.3)
RBC # BLD AUTO: 3.64 10E6/UL (ref 4.4–5.9)
WBC # BLD AUTO: 5.5 10E3/UL (ref 4–11)

## 2024-12-07 PROCEDURE — 85014 HEMATOCRIT: CPT | Performed by: STUDENT IN AN ORGANIZED HEALTH CARE EDUCATION/TRAINING PROGRAM

## 2024-12-07 PROCEDURE — 85041 AUTOMATED RBC COUNT: CPT | Performed by: STUDENT IN AN ORGANIZED HEALTH CARE EDUCATION/TRAINING PROGRAM

## 2024-12-07 PROCEDURE — 83735 ASSAY OF MAGNESIUM: CPT | Performed by: INTERNAL MEDICINE

## 2024-12-07 PROCEDURE — 250N000013 HC RX MED GY IP 250 OP 250 PS 637: Performed by: INTERNAL MEDICINE

## 2024-12-07 PROCEDURE — 250N000013 HC RX MED GY IP 250 OP 250 PS 637: Performed by: SURGERY

## 2024-12-07 PROCEDURE — 36415 COLL VENOUS BLD VENIPUNCTURE: CPT | Performed by: STUDENT IN AN ORGANIZED HEALTH CARE EDUCATION/TRAINING PROGRAM

## 2024-12-07 PROCEDURE — 84132 ASSAY OF SERUM POTASSIUM: CPT | Performed by: INTERNAL MEDICINE

## 2024-12-07 PROCEDURE — 99238 HOSP IP/OBS DSCHRG MGMT 30/<: CPT | Performed by: INTERNAL MEDICINE

## 2024-12-07 RX ORDER — IBUPROFEN 400 MG/1
800 TABLET, FILM COATED ORAL EVERY 6 HOURS PRN
Status: DISCONTINUED | OUTPATIENT
Start: 2024-12-07 | End: 2024-12-07 | Stop reason: HOSPADM

## 2024-12-07 RX ORDER — ACETAMINOPHEN 500 MG
1000 TABLET ORAL 3 TIMES DAILY
Status: SHIPPED
Start: 2024-12-07

## 2024-12-07 RX ORDER — MULTIVIT-MIN/IRON FUM/FOLIC AC 7.5 MG-4
1 TABLET ORAL DAILY
Status: SHIPPED
Start: 2024-12-07

## 2024-12-07 RX ORDER — OXYCODONE HYDROCHLORIDE 5 MG/1
TABLET ORAL
Qty: 48 TABLET | Refills: 0 | Status: SHIPPED | OUTPATIENT
Start: 2024-12-07 | End: 2024-12-13

## 2024-12-07 RX ORDER — GABAPENTIN 300 MG/1
300 CAPSULE ORAL 3 TIMES DAILY
Qty: 90 CAPSULE | Refills: 0 | Status: SHIPPED | OUTPATIENT
Start: 2024-12-07 | End: 2025-01-06

## 2024-12-07 RX ORDER — OMEGA-3 FATTY ACIDS/FISH OIL 300-1000MG
600 CAPSULE ORAL 3 TIMES DAILY
Status: SHIPPED
Start: 2024-12-07

## 2024-12-07 RX ADMIN — POLYETHYLENE GLYCOL 3350 17 G: 17 POWDER, FOR SOLUTION ORAL at 09:10

## 2024-12-07 RX ADMIN — PANTOPRAZOLE SODIUM 40 MG: 40 TABLET, DELAYED RELEASE ORAL at 08:52

## 2024-12-07 RX ADMIN — METHOCARBAMOL 1000 MG: 500 TABLET ORAL at 08:52

## 2024-12-07 RX ADMIN — Medication 1 TABLET: at 08:52

## 2024-12-07 RX ADMIN — SERTRALINE HYDROCHLORIDE 150 MG: 50 TABLET ORAL at 08:52

## 2024-12-07 RX ADMIN — OXYCODONE HYDROCHLORIDE 15 MG: 10 TABLET ORAL at 10:42

## 2024-12-07 RX ADMIN — FOLIC ACID 1 MG: 1 TABLET ORAL at 08:52

## 2024-12-07 RX ADMIN — CLONIDINE HYDROCHLORIDE 0.1 MG: 0.1 TABLET ORAL at 08:52

## 2024-12-07 RX ADMIN — THIAMINE HCL TAB 100 MG 100 MG: 100 TAB at 08:52

## 2024-12-07 RX ADMIN — OXYCODONE HYDROCHLORIDE 15 MG: 10 TABLET ORAL at 06:03

## 2024-12-07 RX ADMIN — ACETAMINOPHEN 975 MG: 325 TABLET, FILM COATED ORAL at 06:03

## 2024-12-07 RX ADMIN — GABAPENTIN 900 MG: 300 CAPSULE ORAL at 08:52

## 2024-12-07 ASSESSMENT — ACTIVITIES OF DAILY LIVING (ADL)
ADLS_ACUITY_SCORE: 25
DEPENDENT_IADLS:: INDEPENDENT
ADLS_ACUITY_SCORE: 25
ADLS_ACUITY_SCORE: 25

## 2024-12-07 NOTE — PLAN OF CARE
Goal Outcome Evaluation:      Plan of Care Reviewed With: patient    Overall Patient Progress: improvingOverall Patient Progress: improving    Outcome Evaluation: Pt AOx4 up adlib. CIWAs 0 throughout day, minus some initial sweating and mild tremor. Pt with often severe upper back/rib pain, area of fall. Gave an initial IV dilaudid dose which helped mildly. Dilaudid removed by MD, encouraging oxycodone insteady. Initial dose of oxy wasn't cutting it, dose increased to 15mg. When paired with other scheduled meds / muscle relaxer - pt reported lowest pain of recent days with a 3/10 - which is managable per pt. Pt had an EEG today - results unremarkable. Likely to discharge tomorrow on oral pain meds. Chem dep following. Surgery and neuro advised today as well. PIV out - alloweded per MD. Day 2 no BM - pt somewhat disappointed by lack of action from stool softners he has taken, bowel sounds audible.      Problem: Adult Inpatient Plan of Care  Goal: Plan of Care Review  Description: The Plan of Care Review/Shift note should be completed every shift.  The Outcome Evaluation is a brief statement about your assessment that the patient is improving, declining, or no change.  This information will be displayed automatically on your shift  note.  Outcome: Progressing  Flowsheets (Taken 12/6/2024 1831)  Outcome Evaluation: Pt AOx4 up adlib. CIWAs 0 throughout day, minus some initial sweating and mild tremor. Pt with often severe upper back/rib pain, area of fall. Gave an initial IV dilaudid dose which helped mildly. Dilaudid removed by MD, encouraging oxycodone insteady. Initial dose of oxy wasn't cutting it, dose increased to 15mg. When paired with other scheduled meds / muscle relaxer - pt reported lowest pain of recent days with a 3/10 - which is managable per pt. Pt had an EEG today - results unremarkable. Likely to discharge tomorrow on oral pain meds. Chem dep following. Surgery and neuro advised today as well. PIV out -  "alloweded per MD. Day 2 no BM - pt somewhat disappointed by lack of action from stool softners he has taken, bowel sounds audible.  Plan of Care Reviewed With: patient  Overall Patient Progress: improving  Goal: Patient-Specific Goal (Individualized)  Description: You can add care plan individualizations to a care plan. Examples of Individualization might be:  \"Parent requests to be called daily at 9am for status\", \"I have a hard time hearing out of my right ear\", or \"Do not touch me to wake me up as it startles  me\".  Outcome: Progressing  Goal: Absence of Hospital-Acquired Illness or Injury  Outcome: Progressing  Intervention: Prevent Skin Injury  Recent Flowsheet Documentation  Taken 12/6/2024 1142 by Isai Meza RN  Body Position: position changed independently  Goal: Optimal Comfort and Wellbeing  Outcome: Progressing  Goal: Readiness for Transition of Care  Outcome: Progressing     Problem: Alcohol Withdrawal  Goal: Alcohol Withdrawal Symptom Control  Outcome: Progressing  Goal: Optimal Neurologic Function  Outcome: Progressing  Goal: Readiness for Change Identified  Outcome: Progressing     Problem: Pain Acute  Goal: Optimal Pain Control and Function  Outcome: Progressing     "

## 2024-12-07 NOTE — CONSULTS
Care Management Initial Consult    General Information  Assessment completed with: Tello Granger  Type of CM/SW Visit: Other    Primary Care Provider verified and updated as needed:     Readmission within the last 30 days: no previous admission in last 30 days      Reason for Consult: financial concerns, insurance concerns, substance use concerns  Advance Care Planning:            Communication Assessment  Patient's communication style: spoken language (English or Bilingual)    Hearing Difficulty or Deaf: no   Wear Glasses or Blind: no    Cognitive  Cognitive/Neuro/Behavioral: WDL                      Living Environment:   People in home:       Current living Arrangements:        Able to return to prior arrangements:         Family/Social Support:  Care provided by: self, spouse/significant other, parent(s)  Provides care for:    Marital Status: Single  Support system: Significant Other       Unique  Description of Support System: Supportive         Current Resources:   Patient receiving home care services: No        Community Resources: None  Equipment currently used at home: none  Supplies currently used at home: None    Employment/Financial:  Employment Status:          Financial Concerns:   Drives for [employer].   He cannot drive for 3 months due to having a seizure.   Is worried about future income.    Encouraged him to discuss with his HR.     Finance Comments: Has benefits    Does the patient's insurance plan have a 3 day qualifying hospital stay waiver?  No    Lifestyle & Psychosocial Needs:  Social Drivers of Health     Food Insecurity: Low Risk  (12/4/2024)    Food Insecurity     Within the past 12 months, did you worry that your food would run out before you got money to buy more?: No     Within the past 12 months, did the food you bought just not last and you didn t have money to get more?: No   Depression: Not at risk (7/19/2024)    Received from [facility]    PHQ-2      PHQ-2 TOTAL SCORE: 1   Housing Stability: Low Risk  (12/4/2024)    Housing Stability     Do you have housing? : Yes     Are you worried about losing your housing?: No   Tobacco Use: Low Risk  (8/19/2024)    Received from [facility]    Patient History     Smoking Tobacco Use: Never     Smokeless Tobacco Use: Never     Passive Exposure: Not on file   Financial Resource Strain: Low Risk  (12/4/2024)    Financial Resource Strain     Within the past 12 months, have you or your family members you live with been unable to get utilities (heat, electricity) when it was really needed?: No   Alcohol Use: Not on file   Transportation Needs: Low Risk  (12/4/2024)    Transportation Needs     Within the past 12 months, has lack of transportation kept you from medical appointments, getting your medicines, non-medical meetings or appointments, work, or from getting things that you need?: No   Physical Activity: Not on file   Interpersonal Safety: High Risk (12/4/2024)    Interpersonal Safety     Do you feel physically and emotionally safe where you currently live?: No     Within the past 12 months, have you been hit, slapped, kicked or otherwise physically hurt by someone?: No     Within the past 12 months, have you been humiliated or emotionally abused in other ways by your partner or ex-partner?: No   Stress: Not on file   Social Connections: Socially Integrated (8/19/2024)    Received from [facility]    Social Connections     Do you often feel lonely or isolated from those around you?: 0   Health Literacy: Not on file       Functional Status:  Prior to admission patient needed assistance:   Dependent ADLs:: Independent  Dependent IADLs:: Independent  Assesssment of Functional Status: At functional baseline    Chemical Dependency Status:  Chemical Dependency Status: Current Concern             Care Management Discharge Note    Discharge Date: 12/07/2024        Discharge Disposition: Home    Discharge Services: None    Discharge DME: None    Discharge Transportation: family or friend will provide    Private pay costs discussed: Not applicable    Does the patient's insurance plan have a 3 day qualifying hospital stay waiver?  No    PAS Confirmation Code:    Patient/family educated on Medicare website which has current facility and service quality ratings:      Education Provided on the Discharge Plan: Yes  Persons Notified of Discharge Plans: Tello and partner Unique  Patient/Family in Agreement with the Plan:  Yes      Additional Information:  DEWAYNE consulted by  to help with income concerns.     DEWAYNE visited with Tello and his partner Unique.   Tello has been employed with [employer] for 15 years with benefits.  He is a  for [employer].    Since he had a seizure from withdrawing from alcohol, he is no longer able to drive for 3 months.     DEWAYNE encouraged him to start a conversation with HR at his employer and go from there.   No other needs at this time.    Neli Galindo, [facility] - [phone]

## 2024-12-07 NOTE — PROGRESS NOTES
A&Ox4. VSS. Denies nausea. Scoring 0 on CIWASs. CMS intact. Pain controlled with prn Oxy and scheduled Robaxin. Tolerating regular diet. Independent in room.     Pt discharged home around 1245. AVS reviewed. Pt demonstrates understanding of discharge instructions and has no further questions at this time.

## 2024-12-07 NOTE — PLAN OF CARE
"Goal Outcome Evaluation:      Plan of Care Reviewed With: patient          Outcome Evaluation: No acute events overnight. Pain managed adeqautely with Tylenol, Robaxin, and PRN Oxy. Pt hopeful to discharge home today.    Tolerating regular diet. Scoring 0-1 on CIWA. Up ad shelly.       Problem: Adult Inpatient Plan of Care  Goal: Plan of Care Review  Description: The Plan of Care Review/Shift note should be completed every shift.  The Outcome Evaluation is a brief statement about your assessment that the patient is improving, declining, or no change.  This information will be displayed automatically on your shift  note.  Outcome: Progressing  Flowsheets (Taken 12/7/2024 0412)  Outcome Evaluation: No acute events overnight. Pain managed adeqautely with Tylenol, Robaxin, and PRN Oxy. Pt hopeful to discharge home today.  Plan of Care Reviewed With: patient  Goal: Patient-Specific Goal (Individualized)  Description: You can add care plan individualizations to a care plan. Examples of Individualization might be:  \"Parent requests to be called daily at 9am for status\", \"I have a hard time hearing out of my right ear\", or \"Do not touch me to wake me up as it startles  me\".  Outcome: Progressing  Goal: Absence of Hospital-Acquired Illness or Injury  Outcome: Progressing  Intervention: Identify and Manage Fall Risk  Recent Flowsheet Documentation  Taken 12/6/2024 2029 by Diana Ogden RN  Safety Promotion/Fall Prevention:   clutter free environment maintained   patient and family education   safety round/check completed  Goal: Optimal Comfort and Wellbeing  Outcome: Progressing  Intervention: Monitor Pain and Promote Comfort  Recent Flowsheet Documentation  Taken 12/6/2024 2027 by Diana Ogden RN  Pain Management Interventions: medication (see MAR)  Goal: Readiness for Transition of Care  Outcome: Progressing     Problem: Alcohol Withdrawal  Goal: Alcohol Withdrawal Symptom Control  Outcome: " Progressing  Intervention: Minimize or Manage Alcohol Withdrawal Symptoms  Recent Flowsheet Documentation  Taken 12/6/2024 2029 by Diana Ogden RN  Seizure Precautions:   clutter-free environment maintained   side rails padded  Goal: Optimal Neurologic Function  Outcome: Progressing  Intervention: Prevent Seizure-Related Injury  Recent Flowsheet Documentation  Taken 12/6/2024 2029 by Diana Ogden RN  Seizure Precautions:   clutter-free environment maintained   side rails padded  Goal: Readiness for Change Identified  Outcome: Progressing     Problem: Pain Acute  Goal: Optimal Pain Control and Function  Outcome: Progressing  Intervention: Develop Pain Management Plan  Recent Flowsheet Documentation  Taken 12/6/2024 2027 by Diana Ogden RN  Pain Management Interventions: medication (see MAR)  Intervention: Prevent or Manage Pain  Recent Flowsheet Documentation  Taken 12/6/2024 2029 by Diana Ogden RN  Medication Review/Management:   medications reviewed   high-risk medications identified

## 2024-12-09 LAB — VIT B1 PYROPHOSHATE BLD-SCNC: 210 NMOL/L

## 2024-12-10 ENCOUNTER — PATIENT OUTREACH (OUTPATIENT)
Dept: CARE COORDINATION | Facility: CLINIC | Age: 39
End: 2024-12-10
Payer: COMMERCIAL

## 2024-12-10 NOTE — PROGRESS NOTES
Connected Care Resource Center:   [facility] Care Resource Center Contact  [facility]/Voicemail     Clinical Data: Post-Discharge Outreach     Outreach attempted x 2.  Left message on patient's voicemail, providing [facility]'s central phone number of [phone] ([phone]) for questions/concerns and/or to schedule an appt with an [facility] provider, if they do not have a PCP.      Plan:  [facility] will do no further outreaches at this time.       LALIT Vogt  Connected Care Resource Center, [facility]    *Connected Care Resource Team does NOT follow patient ongoing. Referrals are identified based on internal discharge reports and the outreach is to ensure patient has an understanding of their discharge instructions.

## 2025-01-26 ENCOUNTER — HEALTH MAINTENANCE LETTER (OUTPATIENT)
Age: 40
End: 2025-01-26

## 2025-01-27 ENCOUNTER — TELEPHONE (OUTPATIENT)
Dept: BEHAVIORAL HEALTH | Facility: CLINIC | Age: 40
End: 2025-01-27
Payer: COMMERCIAL

## 2025-03-19 ENCOUNTER — APPOINTMENT (OUTPATIENT)
Dept: GENERAL RADIOLOGY | Facility: CLINIC | Age: 40
DRG: 563 | End: 2025-03-19
Attending: EMERGENCY MEDICINE
Payer: COMMERCIAL

## 2025-03-19 ENCOUNTER — APPOINTMENT (OUTPATIENT)
Dept: CT IMAGING | Facility: CLINIC | Age: 40
DRG: 563 | End: 2025-03-19
Attending: EMERGENCY MEDICINE
Payer: COMMERCIAL

## 2025-03-19 ENCOUNTER — HOSPITAL ENCOUNTER (INPATIENT)
Facility: CLINIC | Age: 40
DRG: 563 | End: 2025-03-19
Attending: EMERGENCY MEDICINE | Admitting: INTERNAL MEDICINE
Payer: COMMERCIAL

## 2025-03-19 DIAGNOSIS — S42.251A CLOSED DISPLACED FRACTURE OF GREATER TUBEROSITY OF RIGHT HUMERUS, INITIAL ENCOUNTER: ICD-10-CM

## 2025-03-19 DIAGNOSIS — S43.004A SHOULDER DISLOCATION, RIGHT, INITIAL ENCOUNTER: ICD-10-CM

## 2025-03-19 DIAGNOSIS — S01.01XA SCALP LACERATION, INITIAL ENCOUNTER: ICD-10-CM

## 2025-03-19 DIAGNOSIS — R56.9 ALCOHOL WITHDRAWAL SEIZURE WITH COMPLICATION (H): ICD-10-CM

## 2025-03-19 DIAGNOSIS — F10.939 ALCOHOL WITHDRAWAL SEIZURE WITH COMPLICATION (H): ICD-10-CM

## 2025-03-19 PROBLEM — S42.91XA TRAUMATIC CLOSED DISPLACED FRACTURE OF RIGHT SHOULDER WITH ANTERIOR DISLOCATION: Status: ACTIVE | Noted: 2025-03-19

## 2025-03-19 PROBLEM — F10.10 ETOH ABUSE: Status: ACTIVE | Noted: 2025-03-19

## 2025-03-19 LAB
ALBUMIN SERPL BCG-MCNC: 4.9 G/DL (ref 3.5–5.2)
ALP SERPL-CCNC: 88 U/L (ref 40–150)
ALT SERPL W P-5'-P-CCNC: 102 U/L (ref 0–70)
ANION GAP SERPL CALCULATED.3IONS-SCNC: 24 MMOL/L (ref 7–15)
AST SERPL W P-5'-P-CCNC: 132 U/L (ref 0–45)
BASOPHILS # BLD AUTO: 0 10E3/UL (ref 0–0.2)
BASOPHILS NFR BLD AUTO: 0 %
BILIRUB DIRECT SERPL-MCNC: 0.44 MG/DL (ref 0–0.3)
BILIRUB SERPL-MCNC: 1.2 MG/DL
BUN SERPL-MCNC: 6 MG/DL (ref 6–20)
CALCIUM SERPL-MCNC: 10.3 MG/DL (ref 8.8–10.4)
CHLORIDE SERPL-SCNC: 92 MMOL/L (ref 98–107)
CREAT SERPL-MCNC: 0.88 MG/DL (ref 0.67–1.17)
EGFRCR SERPLBLD CKD-EPI 2021: >90 ML/MIN/1.73M2
EOSINOPHIL # BLD AUTO: 0 10E3/UL (ref 0–0.7)
EOSINOPHIL NFR BLD AUTO: 0 %
ERYTHROCYTE [DISTWIDTH] IN BLOOD BY AUTOMATED COUNT: 12.1 % (ref 10–15)
ETHANOL SERPL-MCNC: <0.01 G/DL
GLUCOSE SERPL-MCNC: 192 MG/DL (ref 70–99)
HCO3 SERPL-SCNC: 19 MMOL/L (ref 22–29)
HCT VFR BLD AUTO: 44.7 % (ref 40–53)
HGB BLD-MCNC: 15.6 G/DL (ref 13.3–17.7)
HOLD SPECIMEN: NORMAL
HOLD SPECIMEN: NORMAL
IMM GRANULOCYTES # BLD: 0 10E3/UL
IMM GRANULOCYTES NFR BLD: 0 %
LIPASE SERPL-CCNC: 10 U/L (ref 13–60)
LYMPHOCYTES # BLD AUTO: 1.1 10E3/UL (ref 0.8–5.3)
LYMPHOCYTES NFR BLD AUTO: 14 %
MAGNESIUM SERPL-MCNC: 2.1 MG/DL (ref 1.7–2.3)
MCH RBC QN AUTO: 33.8 PG (ref 26.5–33)
MCHC RBC AUTO-ENTMCNC: 34.9 G/DL (ref 31.5–36.5)
MCV RBC AUTO: 97 FL (ref 78–100)
MONOCYTES # BLD AUTO: 0.5 10E3/UL (ref 0–1.3)
MONOCYTES NFR BLD AUTO: 7 %
NEUTROPHILS # BLD AUTO: 5.9 10E3/UL (ref 1.6–8.3)
NEUTROPHILS NFR BLD AUTO: 78 %
NRBC # BLD AUTO: 0 10E3/UL
NRBC BLD AUTO-RTO: 0 /100
PHOSPHATE SERPL-MCNC: 2.3 MG/DL (ref 2.5–4.5)
PLATELET # BLD AUTO: 159 10E3/UL (ref 150–450)
POTASSIUM SERPL-SCNC: 3.9 MMOL/L (ref 3.4–5.3)
PROT SERPL-MCNC: 8.7 G/DL (ref 6.4–8.3)
RBC # BLD AUTO: 4.62 10E6/UL (ref 4.4–5.9)
SODIUM SERPL-SCNC: 135 MMOL/L (ref 135–145)
WBC # BLD AUTO: 7.6 10E3/UL (ref 4–11)

## 2025-03-19 PROCEDURE — 83735 ASSAY OF MAGNESIUM: CPT | Performed by: EMERGENCY MEDICINE

## 2025-03-19 PROCEDURE — 84100 ASSAY OF PHOSPHORUS: CPT | Performed by: INTERNAL MEDICINE

## 2025-03-19 PROCEDURE — 0RSJXZZ REPOSITION RIGHT SHOULDER JOINT, EXTERNAL APPROACH: ICD-10-PCS | Performed by: EMERGENCY MEDICINE

## 2025-03-19 PROCEDURE — 12002 RPR S/N/AX/GEN/TRNK2.6-7.5CM: CPT

## 2025-03-19 PROCEDURE — 258N000003 HC RX IP 258 OP 636: Performed by: INTERNAL MEDICINE

## 2025-03-19 PROCEDURE — 93005 ELECTROCARDIOGRAM TRACING: CPT

## 2025-03-19 PROCEDURE — 72125 CT NECK SPINE W/O DYE: CPT

## 2025-03-19 PROCEDURE — 258N000003 HC RX IP 258 OP 636: Performed by: EMERGENCY MEDICINE

## 2025-03-19 PROCEDURE — 82248 BILIRUBIN DIRECT: CPT | Performed by: EMERGENCY MEDICINE

## 2025-03-19 PROCEDURE — 96375 TX/PRO/DX INJ NEW DRUG ADDON: CPT

## 2025-03-19 PROCEDURE — 83690 ASSAY OF LIPASE: CPT | Performed by: EMERGENCY MEDICINE

## 2025-03-19 PROCEDURE — 999N000065 XR SHOULDER RIGHT PORT G/E 2 VIEWS: Mod: RT

## 2025-03-19 PROCEDURE — 73030 X-RAY EXAM OF SHOULDER: CPT | Mod: RT

## 2025-03-19 PROCEDURE — 70450 CT HEAD/BRAIN W/O DYE: CPT

## 2025-03-19 PROCEDURE — 36415 COLL VENOUS BLD VENIPUNCTURE: CPT | Performed by: EMERGENCY MEDICINE

## 2025-03-19 PROCEDURE — 23605 CLTX PRX HMRL FX MNPJ+-TRACT: CPT | Mod: RT

## 2025-03-19 PROCEDURE — 250N000011 HC RX IP 250 OP 636: Performed by: EMERGENCY MEDICINE

## 2025-03-19 PROCEDURE — 96374 THER/PROPH/DIAG INJ IV PUSH: CPT

## 2025-03-19 PROCEDURE — 96376 TX/PRO/DX INJ SAME DRUG ADON: CPT

## 2025-03-19 PROCEDURE — 85025 COMPLETE CBC W/AUTO DIFF WBC: CPT | Performed by: EMERGENCY MEDICINE

## 2025-03-19 PROCEDURE — 99153 MOD SED SAME PHYS/QHP EA: CPT

## 2025-03-19 PROCEDURE — 73200 CT UPPER EXTREMITY W/O DYE: CPT | Mod: RT

## 2025-03-19 PROCEDURE — 99152 MOD SED SAME PHYS/QHP 5/>YRS: CPT

## 2025-03-19 PROCEDURE — 82077 ASSAY SPEC XCP UR&BREATH IA: CPT | Performed by: EMERGENCY MEDICINE

## 2025-03-19 PROCEDURE — HZ2ZZZZ DETOXIFICATION SERVICES FOR SUBSTANCE ABUSE TREATMENT: ICD-10-PCS | Performed by: INTERNAL MEDICINE

## 2025-03-19 PROCEDURE — 99285 EMERGENCY DEPT VISIT HI MDM: CPT | Mod: 25

## 2025-03-19 PROCEDURE — 73060 X-RAY EXAM OF HUMERUS: CPT | Mod: RT

## 2025-03-19 PROCEDURE — 0HQ0XZZ REPAIR SCALP SKIN, EXTERNAL APPROACH: ICD-10-PCS | Performed by: EMERGENCY MEDICINE

## 2025-03-19 PROCEDURE — 999N000157 HC STATISTIC RCP TIME EA 10 MIN

## 2025-03-19 PROCEDURE — 99223 1ST HOSP IP/OBS HIGH 75: CPT | Performed by: INTERNAL MEDICINE

## 2025-03-19 PROCEDURE — 250N000013 HC RX MED GY IP 250 OP 250 PS 637: Performed by: INTERNAL MEDICINE

## 2025-03-19 PROCEDURE — 120N000001 HC R&B MED SURG/OB

## 2025-03-19 RX ORDER — PROCHLORPERAZINE MALEATE 5 MG/1
10 TABLET ORAL EVERY 6 HOURS PRN
Status: DISCONTINUED | OUTPATIENT
Start: 2025-03-19 | End: 2025-03-22 | Stop reason: HOSPADM

## 2025-03-19 RX ORDER — HYDROMORPHONE HYDROCHLORIDE 1 MG/ML
0.5 INJECTION, SOLUTION INTRAMUSCULAR; INTRAVENOUS; SUBCUTANEOUS ONCE
Status: COMPLETED | OUTPATIENT
Start: 2025-03-19 | End: 2025-03-19

## 2025-03-19 RX ORDER — ACETAMINOPHEN 325 MG/1
650 TABLET ORAL EVERY 4 HOURS PRN
Status: DISCONTINUED | OUTPATIENT
Start: 2025-03-19 | End: 2025-03-22 | Stop reason: HOSPADM

## 2025-03-19 RX ORDER — MULTIPLE VITAMINS W/ MINERALS TAB 9MG-400MCG
1 TAB ORAL DAILY
Status: DISCONTINUED | OUTPATIENT
Start: 2025-03-20 | End: 2025-03-22 | Stop reason: HOSPADM

## 2025-03-19 RX ORDER — FOLIC ACID 1 MG/1
1 TABLET ORAL DAILY
Status: DISCONTINUED | OUTPATIENT
Start: 2025-03-20 | End: 2025-03-22 | Stop reason: HOSPADM

## 2025-03-19 RX ORDER — OLANZAPINE 5 MG/1
5-10 TABLET, ORALLY DISINTEGRATING ORAL EVERY 6 HOURS PRN
Status: DISCONTINUED | OUTPATIENT
Start: 2025-03-19 | End: 2025-03-22 | Stop reason: HOSPADM

## 2025-03-19 RX ORDER — DIAZEPAM 10 MG/2ML
5-10 INJECTION, SOLUTION INTRAMUSCULAR; INTRAVENOUS EVERY 30 MIN PRN
Status: DISCONTINUED | OUTPATIENT
Start: 2025-03-19 | End: 2025-03-22 | Stop reason: HOSPADM

## 2025-03-19 RX ORDER — CLONIDINE HYDROCHLORIDE 0.1 MG/1
0.1 TABLET ORAL EVERY 8 HOURS
Status: DISCONTINUED | OUTPATIENT
Start: 2025-03-19 | End: 2025-03-22 | Stop reason: HOSPADM

## 2025-03-19 RX ORDER — CALCIUM CARBONATE 500 MG/1
1000 TABLET, CHEWABLE ORAL 4 TIMES DAILY PRN
Status: DISCONTINUED | OUTPATIENT
Start: 2025-03-19 | End: 2025-03-22 | Stop reason: HOSPADM

## 2025-03-19 RX ORDER — FLUMAZENIL 0.1 MG/ML
0.2 INJECTION, SOLUTION INTRAVENOUS
Status: DISCONTINUED | OUTPATIENT
Start: 2025-03-19 | End: 2025-03-22 | Stop reason: HOSPADM

## 2025-03-19 RX ORDER — GABAPENTIN 300 MG/1
900 CAPSULE ORAL EVERY 8 HOURS
Status: DISCONTINUED | OUTPATIENT
Start: 2025-03-20 | End: 2025-03-22 | Stop reason: HOSPADM

## 2025-03-19 RX ORDER — POLYETHYLENE GLYCOL 3350 17 G/17G
17 POWDER, FOR SOLUTION ORAL DAILY
Status: DISCONTINUED | OUTPATIENT
Start: 2025-03-20 | End: 2025-03-22 | Stop reason: HOSPADM

## 2025-03-19 RX ORDER — GABAPENTIN 100 MG/1
100 CAPSULE ORAL EVERY 8 HOURS
Status: DISCONTINUED | OUTPATIENT
Start: 2025-03-27 | End: 2025-03-22 | Stop reason: HOSPADM

## 2025-03-19 RX ORDER — DIAZEPAM 10 MG/2ML
5 INJECTION, SOLUTION INTRAMUSCULAR; INTRAVENOUS EVERY 30 MIN PRN
Status: DISCONTINUED | OUTPATIENT
Start: 2025-03-19 | End: 2025-03-19

## 2025-03-19 RX ORDER — SODIUM CHLORIDE 9 MG/ML
INJECTION, SOLUTION INTRAVENOUS CONTINUOUS
Status: DISCONTINUED | OUTPATIENT
Start: 2025-03-19 | End: 2025-03-20

## 2025-03-19 RX ORDER — ACETAMINOPHEN 650 MG/1
650 SUPPOSITORY RECTAL EVERY 4 HOURS PRN
Status: DISCONTINUED | OUTPATIENT
Start: 2025-03-19 | End: 2025-03-22 | Stop reason: HOSPADM

## 2025-03-19 RX ORDER — HYDROMORPHONE HYDROCHLORIDE 1 MG/ML
0.3 INJECTION, SOLUTION INTRAMUSCULAR; INTRAVENOUS; SUBCUTANEOUS
Status: COMPLETED | OUTPATIENT
Start: 2025-03-19 | End: 2025-03-19

## 2025-03-19 RX ORDER — ACAMPROSATE CALCIUM 333 MG/1
666 TABLET, DELAYED RELEASE ORAL 3 TIMES DAILY
COMMUNITY
Start: 2024-12-13

## 2025-03-19 RX ORDER — DIAZEPAM 10 MG/2ML
5 INJECTION, SOLUTION INTRAMUSCULAR; INTRAVENOUS ONCE
Status: COMPLETED | OUTPATIENT
Start: 2025-03-19 | End: 2025-03-19

## 2025-03-19 RX ORDER — AMOXICILLIN 250 MG
1 CAPSULE ORAL 2 TIMES DAILY PRN
Status: DISCONTINUED | OUTPATIENT
Start: 2025-03-19 | End: 2025-03-22 | Stop reason: HOSPADM

## 2025-03-19 RX ORDER — LIDOCAINE 40 MG/G
CREAM TOPICAL
Status: DISCONTINUED | OUTPATIENT
Start: 2025-03-19 | End: 2025-03-22 | Stop reason: HOSPADM

## 2025-03-19 RX ORDER — FAMOTIDINE 20 MG/1
20 TABLET, FILM COATED ORAL 2 TIMES DAILY
Status: DISCONTINUED | OUTPATIENT
Start: 2025-03-19 | End: 2025-03-22 | Stop reason: HOSPADM

## 2025-03-19 RX ORDER — THIAMINE HYDROCHLORIDE 100 MG/ML
100 INJECTION, SOLUTION INTRAMUSCULAR; INTRAVENOUS ONCE
Status: COMPLETED | OUTPATIENT
Start: 2025-03-19 | End: 2025-03-19

## 2025-03-19 RX ORDER — ONDANSETRON 4 MG/1
4 TABLET, ORALLY DISINTEGRATING ORAL EVERY 6 HOURS PRN
Status: DISCONTINUED | OUTPATIENT
Start: 2025-03-19 | End: 2025-03-22 | Stop reason: HOSPADM

## 2025-03-19 RX ORDER — FOLIC ACID 5 MG/ML
1 INJECTION, SOLUTION INTRAMUSCULAR; INTRAVENOUS; SUBCUTANEOUS ONCE
Status: COMPLETED | OUTPATIENT
Start: 2025-03-19 | End: 2025-03-19

## 2025-03-19 RX ORDER — DIAZEPAM 10 MG/1
10 TABLET ORAL EVERY 30 MIN PRN
Status: DISCONTINUED | OUTPATIENT
Start: 2025-03-19 | End: 2025-03-22 | Stop reason: HOSPADM

## 2025-03-19 RX ORDER — LIDOCAINE HYDROCHLORIDE AND EPINEPHRINE 10; 10 MG/ML; UG/ML
5 INJECTION, SOLUTION INFILTRATION; PERINEURAL ONCE
Status: COMPLETED | OUTPATIENT
Start: 2025-03-19 | End: 2025-03-19

## 2025-03-19 RX ORDER — FLUMAZENIL 0.1 MG/ML
0.2 INJECTION, SOLUTION INTRAVENOUS
Status: DISCONTINUED | OUTPATIENT
Start: 2025-03-19 | End: 2025-03-19

## 2025-03-19 RX ORDER — PROPOFOL 10 MG/ML
50 INJECTION, EMULSION INTRAVENOUS
Status: COMPLETED | OUTPATIENT
Start: 2025-03-19 | End: 2025-03-19

## 2025-03-19 RX ORDER — GABAPENTIN 600 MG/1
1200 TABLET ORAL ONCE
Status: COMPLETED | OUTPATIENT
Start: 2025-03-19 | End: 2025-03-19

## 2025-03-19 RX ORDER — AMOXICILLIN 250 MG
2 CAPSULE ORAL 2 TIMES DAILY PRN
Status: DISCONTINUED | OUTPATIENT
Start: 2025-03-19 | End: 2025-03-22 | Stop reason: HOSPADM

## 2025-03-19 RX ORDER — GABAPENTIN 300 MG/1
600 CAPSULE ORAL EVERY 8 HOURS
Status: DISCONTINUED | OUTPATIENT
Start: 2025-03-23 | End: 2025-03-22 | Stop reason: HOSPADM

## 2025-03-19 RX ORDER — PROPOFOL 10 MG/ML
30 INJECTION, EMULSION INTRAVENOUS
Status: DISCONTINUED | OUTPATIENT
Start: 2025-03-19 | End: 2025-03-22 | Stop reason: HOSPADM

## 2025-03-19 RX ORDER — HALOPERIDOL 5 MG/ML
2.5-5 INJECTION INTRAMUSCULAR EVERY 6 HOURS PRN
Status: DISCONTINUED | OUTPATIENT
Start: 2025-03-19 | End: 2025-03-22 | Stop reason: HOSPADM

## 2025-03-19 RX ORDER — GABAPENTIN 300 MG/1
300 CAPSULE ORAL EVERY 8 HOURS
Status: DISCONTINUED | OUTPATIENT
Start: 2025-03-25 | End: 2025-03-22 | Stop reason: HOSPADM

## 2025-03-19 RX ORDER — ONDANSETRON 2 MG/ML
4 INJECTION INTRAMUSCULAR; INTRAVENOUS EVERY 6 HOURS PRN
Status: DISCONTINUED | OUTPATIENT
Start: 2025-03-19 | End: 2025-03-22 | Stop reason: HOSPADM

## 2025-03-19 RX ADMIN — HYDROMORPHONE HYDROCHLORIDE 0.5 MG: 1 INJECTION, SOLUTION INTRAMUSCULAR; INTRAVENOUS; SUBCUTANEOUS at 18:14

## 2025-03-19 RX ADMIN — Medication 5 MG: at 23:45

## 2025-03-19 RX ADMIN — DIAZEPAM 5 MG: 5 INJECTION INTRAMUSCULAR; INTRAVENOUS at 20:12

## 2025-03-19 RX ADMIN — SODIUM CHLORIDE, POTASSIUM CHLORIDE, SODIUM LACTATE AND CALCIUM CHLORIDE 1000 ML: 600; 310; 30; 20 INJECTION, SOLUTION INTRAVENOUS at 18:14

## 2025-03-19 RX ADMIN — HYDROMORPHONE HYDROCHLORIDE 0.3 MG: 1 INJECTION, SOLUTION INTRAMUSCULAR; INTRAVENOUS; SUBCUTANEOUS at 21:09

## 2025-03-19 RX ADMIN — CLONIDINE HYDROCHLORIDE 0.1 MG: 0.1 TABLET ORAL at 23:44

## 2025-03-19 RX ADMIN — PROPOFOL 50 MG: 10 INJECTION, EMULSION INTRAVENOUS at 20:32

## 2025-03-19 RX ADMIN — HYDROMORPHONE HYDROCHLORIDE 0.3 MG: 1 INJECTION, SOLUTION INTRAMUSCULAR; INTRAVENOUS; SUBCUTANEOUS at 20:12

## 2025-03-19 RX ADMIN — ACETAMINOPHEN 650 MG: 325 TABLET, FILM COATED ORAL at 23:45

## 2025-03-19 RX ADMIN — PROPOFOL 20 MG: 10 INJECTION, EMULSION INTRAVENOUS at 20:45

## 2025-03-19 RX ADMIN — PROPOFOL 30 MG: 10 INJECTION, EMULSION INTRAVENOUS at 20:35

## 2025-03-19 RX ADMIN — HYDROMORPHONE HYDROCHLORIDE 0.3 MG: 1 INJECTION, SOLUTION INTRAMUSCULAR; INTRAVENOUS; SUBCUTANEOUS at 22:10

## 2025-03-19 RX ADMIN — PROPOFOL 60 MG: 10 INJECTION, EMULSION INTRAVENOUS at 20:43

## 2025-03-19 RX ADMIN — PROPOFOL 30 MG: 10 INJECTION, EMULSION INTRAVENOUS at 20:37

## 2025-03-19 RX ADMIN — THIAMINE HYDROCHLORIDE 100 MG: 100 INJECTION, SOLUTION INTRAMUSCULAR; INTRAVENOUS at 19:46

## 2025-03-19 RX ADMIN — DIAZEPAM 5 MG: 5 INJECTION INTRAMUSCULAR; INTRAVENOUS at 18:23

## 2025-03-19 RX ADMIN — PROPOFOL 40 MG: 10 INJECTION, EMULSION INTRAVENOUS at 20:39

## 2025-03-19 RX ADMIN — FOLIC ACID 1 MG: 5 INJECTION, SOLUTION INTRAMUSCULAR; INTRAVENOUS; SUBCUTANEOUS at 19:45

## 2025-03-19 RX ADMIN — PROPOFOL 20 MG: 10 INJECTION, EMULSION INTRAVENOUS at 20:48

## 2025-03-19 RX ADMIN — SODIUM CHLORIDE: 0.9 INJECTION, SOLUTION INTRAVENOUS at 23:51

## 2025-03-19 RX ADMIN — POTASSIUM & SODIUM PHOSPHATES POWDER PACK 280-160-250 MG 2 PACKET: 280-160-250 PACK at 23:43

## 2025-03-19 RX ADMIN — GABAPENTIN 1200 MG: 600 TABLET, FILM COATED ORAL at 23:44

## 2025-03-19 RX ADMIN — FAMOTIDINE 20 MG: 20 TABLET, FILM COATED ORAL at 23:43

## 2025-03-19 RX ADMIN — LIDOCAINE HYDROCHLORIDE,EPINEPHRINE BITARTRATE 5 ML: 10; .01 INJECTION, SOLUTION INFILTRATION; PERINEURAL at 20:06

## 2025-03-19 RX ADMIN — PROPOFOL 30 MG: 10 INJECTION, EMULSION INTRAVENOUS at 20:38

## 2025-03-19 ASSESSMENT — LIFESTYLE VARIABLES
VISUAL DISTURBANCES: NOT PRESENT
HEADACHE, FULLNESS IN HEAD: NOT PRESENT
TREMOR: 2
ORIENTATION AND CLOUDING OF SENSORIUM: CANNOT DO SERIAL ADDITIONS OR IS UNCERTAIN ABOUT DATE
TOTAL SCORE: 6
VISUAL DISTURBANCES: NOT PRESENT
AUDITORY DISTURBANCES: NOT PRESENT
NAUSEA AND VOMITING: NO NAUSEA AND NO VOMITING
PAROXYSMAL SWEATS: NO SWEAT VISIBLE
VISUAL DISTURBANCES: NOT PRESENT
ANXIETY: 2
TOTAL SCORE: 14
NAUSEA AND VOMITING: NO NAUSEA AND NO VOMITING
VISUAL DISTURBANCES: NOT PRESENT
AGITATION: SOMEWHAT MORE THAN NORMAL ACTIVITY
TREMOR: 2
PAROXYSMAL SWEATS: BARELY PERCEPTIBLE SWEATING, PALMS MOIST
AUDITORY DISTURBANCES: VERY MILD HARSHNESS OR ABILITY TO FRIGHTEN
NAUSEA AND VOMITING: 3
ORIENTATION AND CLOUDING OF SENSORIUM: CANNOT DO SERIAL ADDITIONS OR IS UNCERTAIN ABOUT DATE
PAROXYSMAL SWEATS: BEADS OF SWEAT OBVIOUS ON FOREHEAD
AGITATION: SOMEWHAT MORE THAN NORMAL ACTIVITY
ANXIETY: MILDLY ANXIOUS
HEADACHE, FULLNESS IN HEAD: NOT PRESENT
ANXIETY: NO ANXIETY, AT EASE
TOTAL SCORE: 15
HEADACHE, FULLNESS IN HEAD: NOT PRESENT
ANXIETY: MODERATELY ANXIOUS, OR GUARDED, SO ANXIETY IS INFERRED
TOTAL SCORE: 0
TREMOR: NO TREMOR
TREMOR: 2
ORIENTATION AND CLOUDING OF SENSORIUM: CANNOT DO SERIAL ADDITIONS OR IS UNCERTAIN ABOUT DATE
ORIENTATION AND CLOUDING OF SENSORIUM: ORIENTED AND CAN DO SERIAL ADDITIONS
PAROXYSMAL SWEATS: BEADS OF SWEAT OBVIOUS ON FOREHEAD
AGITATION: SOMEWHAT MORE THAN NORMAL ACTIVITY
AGITATION: NORMAL ACTIVITY
AUDITORY DISTURBANCES: NOT PRESENT
AUDITORY DISTURBANCES: NOT PRESENT
NAUSEA AND VOMITING: 3
HEADACHE, FULLNESS IN HEAD: NOT PRESENT

## 2025-03-19 ASSESSMENT — ACTIVITIES OF DAILY LIVING (ADL)
ADLS_ACUITY_SCORE: 48

## 2025-03-19 NOTE — ED TRIAGE NOTES
Triage Assessment (Adult)       Row Name 03/19/25 1841          Triage Assessment    Airway WDL WDL        Respiratory WDL    Respiratory WDL WDL        Skin Circulation/Temperature WDL    Skin Circulation/Temperature WDL X  Diaphoresis        Cardiac WDL    Cardiac WDL X;rhythm     Pulse Rate & Regularity tachycardic        Peripheral/Neurovascular WDL    Peripheral Neurovascular WDL WDL        Cognitive/Neuro/Behavioral WDL    Cognitive/Neuro/Behavioral WDL X     Level of Consciousness alert     Arousal Level opens eyes spontaneously     Orientation oriented x 4     Speech clear     Mood/Behavior anxious        Pupils (CN II)    Pupil PERRLA yes        Lucas Coma Scale    Best Eye Response 4-->(E4) spontaneous     Best Motor Response 6-->(M6) obeys commands     Best Verbal Response 5-->(V5) oriented     Lucas Coma Scale Score 15

## 2025-03-19 NOTE — LETTER
[facility] MED[facility]  [address]  [facility] [address]  Phone: [phone]  Fax: [phone]    March 22, 2025        Tello Jones  [address] [facility] [place] [address]          To whom it may concern:    RE: Tello Balbuenalisa    Mr. Jones was hospitalized at [facility] from 3/19/2025 to 3/22/2025.  The following work restrictions are recommended on discharge from the hospital:  no weight bearing or lifting using the right upper extremity for the next 30 days.        Sincerely,      Wil Mckeon MD  Hospitalist

## 2025-03-19 NOTE — ED TRIAGE NOTES
Per EMS patient has HX of withdrawal seizures.  Last ETOH was 3/18 and tonight at 1630 patient was found on floor having tonic clonic episodes (x2) movements witnessed by S.O. Racquel burris shoulder 10/10 injured during fall.  With observable right face lacerations.

## 2025-03-20 VITALS
RESPIRATION RATE: 18 BRPM | TEMPERATURE: 98.1 F | OXYGEN SATURATION: 94 % | SYSTOLIC BLOOD PRESSURE: 112 MMHG | DIASTOLIC BLOOD PRESSURE: 69 MMHG | HEART RATE: 74 BPM | BODY MASS INDEX: 25.73 KG/M2 | HEIGHT: 72 IN | WEIGHT: 190 LBS

## 2025-03-20 LAB
ALBUMIN SERPL BCG-MCNC: 3.5 G/DL (ref 3.5–5.2)
ALP SERPL-CCNC: 60 U/L (ref 40–150)
ALT SERPL W P-5'-P-CCNC: 64 U/L (ref 0–70)
ANION GAP SERPL CALCULATED.3IONS-SCNC: 10 MMOL/L (ref 7–15)
AST SERPL W P-5'-P-CCNC: 68 U/L (ref 0–45)
ATRIAL RATE - MUSE: 101 BPM
BASOPHILS # BLD AUTO: 0 10E3/UL (ref 0–0.2)
BASOPHILS NFR BLD AUTO: 0 %
BILIRUB SERPL-MCNC: 1.2 MG/DL
BUN SERPL-MCNC: 5.8 MG/DL (ref 6–20)
CALCIUM SERPL-MCNC: 8.1 MG/DL (ref 8.8–10.4)
CHLORIDE SERPL-SCNC: 101 MMOL/L (ref 98–107)
CREAT SERPL-MCNC: 0.68 MG/DL (ref 0.67–1.17)
DIASTOLIC BLOOD PRESSURE - MUSE: NORMAL MMHG
EGFRCR SERPLBLD CKD-EPI 2021: >90 ML/MIN/1.73M2
EOSINOPHIL # BLD AUTO: 0 10E3/UL (ref 0–0.7)
EOSINOPHIL NFR BLD AUTO: 1 %
ERYTHROCYTE [DISTWIDTH] IN BLOOD BY AUTOMATED COUNT: 12.2 % (ref 10–15)
GLUCOSE SERPL-MCNC: 85 MG/DL (ref 70–99)
HCO3 SERPL-SCNC: 24 MMOL/L (ref 22–29)
HCT VFR BLD AUTO: 32.9 % (ref 40–53)
HGB BLD-MCNC: 11.3 G/DL (ref 13.3–17.7)
IMM GRANULOCYTES # BLD: 0 10E3/UL
IMM GRANULOCYTES NFR BLD: 1 %
INTERPRETATION ECG - MUSE: NORMAL
LYMPHOCYTES # BLD AUTO: 0.8 10E3/UL (ref 0.8–5.3)
LYMPHOCYTES NFR BLD AUTO: 15 %
MCH RBC QN AUTO: 34 PG (ref 26.5–33)
MCHC RBC AUTO-ENTMCNC: 34.3 G/DL (ref 31.5–36.5)
MCV RBC AUTO: 99 FL (ref 78–100)
MONOCYTES # BLD AUTO: 0.4 10E3/UL (ref 0–1.3)
MONOCYTES NFR BLD AUTO: 8 %
NEUTROPHILS # BLD AUTO: 4 10E3/UL (ref 1.6–8.3)
NEUTROPHILS NFR BLD AUTO: 76 %
NRBC # BLD AUTO: 0 10E3/UL
NRBC BLD AUTO-RTO: 0 /100
P AXIS - MUSE: 32 DEGREES
PLATELET # BLD AUTO: 95 10E3/UL (ref 150–450)
POTASSIUM SERPL-SCNC: 3.2 MMOL/L (ref 3.4–5.3)
POTASSIUM SERPL-SCNC: 4.2 MMOL/L (ref 3.4–5.3)
PR INTERVAL - MUSE: 174 MS
PROT SERPL-MCNC: 6.2 G/DL (ref 6.4–8.3)
QRS DURATION - MUSE: 86 MS
QT - MUSE: 354 MS
QTC - MUSE: 459 MS
R AXIS - MUSE: 50 DEGREES
RBC # BLD AUTO: 3.32 10E6/UL (ref 4.4–5.9)
SODIUM SERPL-SCNC: 135 MMOL/L (ref 135–145)
SYSTOLIC BLOOD PRESSURE - MUSE: NORMAL MMHG
T AXIS - MUSE: 28 DEGREES
VENTRICULAR RATE- MUSE: 101 BPM
WBC # BLD AUTO: 5.3 10E3/UL (ref 4–11)

## 2025-03-20 PROCEDURE — 80053 COMPREHEN METABOLIC PANEL: CPT | Performed by: INTERNAL MEDICINE

## 2025-03-20 PROCEDURE — 258N000003 HC RX IP 258 OP 636: Performed by: INTERNAL MEDICINE

## 2025-03-20 PROCEDURE — 250N000013 HC RX MED GY IP 250 OP 250 PS 637: Performed by: INTERNAL MEDICINE

## 2025-03-20 PROCEDURE — 36415 COLL VENOUS BLD VENIPUNCTURE: CPT | Performed by: INTERNAL MEDICINE

## 2025-03-20 PROCEDURE — 85025 COMPLETE CBC W/AUTO DIFF WBC: CPT | Performed by: INTERNAL MEDICINE

## 2025-03-20 PROCEDURE — 84132 ASSAY OF SERUM POTASSIUM: CPT | Performed by: INTERNAL MEDICINE

## 2025-03-20 PROCEDURE — 120N000001 HC R&B MED SURG/OB

## 2025-03-20 PROCEDURE — 99232 SBSQ HOSP IP/OBS MODERATE 35: CPT | Performed by: INTERNAL MEDICINE

## 2025-03-20 RX ORDER — NALOXONE HYDROCHLORIDE 0.4 MG/ML
0.4 INJECTION, SOLUTION INTRAMUSCULAR; INTRAVENOUS; SUBCUTANEOUS
Status: DISCONTINUED | OUTPATIENT
Start: 2025-03-20 | End: 2025-03-22 | Stop reason: HOSPADM

## 2025-03-20 RX ORDER — NALOXONE HYDROCHLORIDE 0.4 MG/ML
0.2 INJECTION, SOLUTION INTRAMUSCULAR; INTRAVENOUS; SUBCUTANEOUS
Status: DISCONTINUED | OUTPATIENT
Start: 2025-03-20 | End: 2025-03-22 | Stop reason: HOSPADM

## 2025-03-20 RX ORDER — TRAZODONE HYDROCHLORIDE 100 MG/1
200 TABLET ORAL AT BEDTIME
Status: DISCONTINUED | OUTPATIENT
Start: 2025-03-20 | End: 2025-03-22 | Stop reason: HOSPADM

## 2025-03-20 RX ORDER — AMITRIPTYLINE HYDROCHLORIDE 50 MG/1
50 TABLET ORAL AT BEDTIME
Status: DISCONTINUED | OUTPATIENT
Start: 2025-03-20 | End: 2025-03-22 | Stop reason: HOSPADM

## 2025-03-20 RX ORDER — OXYCODONE HYDROCHLORIDE 5 MG/1
5 TABLET ORAL EVERY 4 HOURS PRN
Status: DISCONTINUED | OUTPATIENT
Start: 2025-03-20 | End: 2025-03-22 | Stop reason: HOSPADM

## 2025-03-20 RX ORDER — POTASSIUM CHLORIDE 1500 MG/1
40 TABLET, EXTENDED RELEASE ORAL ONCE
Status: COMPLETED | OUTPATIENT
Start: 2025-03-20 | End: 2025-03-20

## 2025-03-20 RX ADMIN — AMITRIPTYLINE HYDROCHLORIDE 50 MG: 50 TABLET, FILM COATED ORAL at 23:45

## 2025-03-20 RX ADMIN — ACETAMINOPHEN 650 MG: 325 TABLET, FILM COATED ORAL at 19:52

## 2025-03-20 RX ADMIN — GABAPENTIN 900 MG: 300 CAPSULE ORAL at 12:40

## 2025-03-20 RX ADMIN — FAMOTIDINE 20 MG: 20 TABLET, FILM COATED ORAL at 19:53

## 2025-03-20 RX ADMIN — TRAZODONE HYDROCHLORIDE 200 MG: 100 TABLET ORAL at 23:45

## 2025-03-20 RX ADMIN — OXYCODONE HYDROCHLORIDE 2.5 MG: 5 TABLET ORAL at 00:34

## 2025-03-20 RX ADMIN — DIAZEPAM 10 MG: 10 TABLET ORAL at 02:06

## 2025-03-20 RX ADMIN — OXYCODONE HYDROCHLORIDE 5 MG: 5 TABLET ORAL at 23:45

## 2025-03-20 RX ADMIN — FAMOTIDINE 20 MG: 20 TABLET, FILM COATED ORAL at 09:29

## 2025-03-20 RX ADMIN — CLONIDINE HYDROCHLORIDE 0.1 MG: 0.1 TABLET ORAL at 19:53

## 2025-03-20 RX ADMIN — DIAZEPAM 10 MG: 10 TABLET ORAL at 00:04

## 2025-03-20 RX ADMIN — THIAMINE HCL TAB 100 MG 100 MG: 100 TAB at 09:29

## 2025-03-20 RX ADMIN — POTASSIUM & SODIUM PHOSPHATES POWDER PACK 280-160-250 MG 1 PACKET: 280-160-250 PACK at 15:46

## 2025-03-20 RX ADMIN — DIAZEPAM 10 MG: 10 TABLET ORAL at 19:57

## 2025-03-20 RX ADMIN — CLONIDINE HYDROCHLORIDE 0.1 MG: 0.1 TABLET ORAL at 12:41

## 2025-03-20 RX ADMIN — FOLIC ACID 1 MG: 1 TABLET ORAL at 09:28

## 2025-03-20 RX ADMIN — Medication 5 MG: at 23:45

## 2025-03-20 RX ADMIN — SODIUM CHLORIDE: 0.9 INJECTION, SOLUTION INTRAVENOUS at 04:36

## 2025-03-20 RX ADMIN — OXYCODONE HYDROCHLORIDE 5 MG: 5 TABLET ORAL at 04:48

## 2025-03-20 RX ADMIN — GABAPENTIN 900 MG: 300 CAPSULE ORAL at 19:52

## 2025-03-20 RX ADMIN — OXYCODONE HYDROCHLORIDE 5 MG: 5 TABLET ORAL at 19:53

## 2025-03-20 RX ADMIN — POTASSIUM & SODIUM PHOSPHATES POWDER PACK 280-160-250 MG 1 PACKET: 280-160-250 PACK at 12:40

## 2025-03-20 RX ADMIN — POTASSIUM & SODIUM PHOSPHATES POWDER PACK 280-160-250 MG 1 PACKET: 280-160-250 PACK at 19:53

## 2025-03-20 RX ADMIN — POTASSIUM CHLORIDE 40 MEQ: 1500 TABLET, EXTENDED RELEASE ORAL at 12:41

## 2025-03-20 RX ADMIN — CLONIDINE HYDROCHLORIDE 0.1 MG: 0.1 TABLET ORAL at 04:48

## 2025-03-20 RX ADMIN — GABAPENTIN 900 MG: 300 CAPSULE ORAL at 04:47

## 2025-03-20 RX ADMIN — OXYCODONE HYDROCHLORIDE 5 MG: 5 TABLET ORAL at 09:28

## 2025-03-20 RX ADMIN — Medication 1 TABLET: at 09:28

## 2025-03-20 RX ADMIN — OXYCODONE HYDROCHLORIDE 5 MG: 5 TABLET ORAL at 13:25

## 2025-03-20 RX ADMIN — ACETAMINOPHEN 650 MG: 325 TABLET, FILM COATED ORAL at 12:42

## 2025-03-20 ASSESSMENT — ACTIVITIES OF DAILY LIVING (ADL)
ADLS_ACUITY_SCORE: 25
ADLS_ACUITY_SCORE: 23
ADLS_ACUITY_SCORE: 25
ADLS_ACUITY_SCORE: 23
ADLS_ACUITY_SCORE: 25

## 2025-03-20 NOTE — PROGRESS NOTES
Cross Cover    Called for 9/10 shoulder pain.  Here with etoh w/d complicated by seizure further complicated by shoulder fx and dislocation which was reduced in the ER    Oxy 2.5/5mg ordered

## 2025-03-20 NOTE — CONSULTS
[facility]    Orthopedic Consultation    Tello Jones MRN# [id number]   Age: 39 year old YOB: 1985     Date of Admission:  3/19/2025    Reason for consult: Right shoulder dislocation/proximal humerus fracture        Requesting provider: ISAAC Muniz       Level of consult: One-time consult to assist in determining a diagnosis, recommend an appropriate treatment plan and place orders           Assessment and Plan:   Assessment:   Right shoulder dislocation/proximal humerus fracture       Plan:   Images reviewed by Dr Magallanes  Non-operative management recommended  Continue use of immobilizer for pain control. Ok to remove with arm at side for hygiene.   Pain medication and ice prn  NWB right UE, max lift coffee cup   Ok to range elbow when pain controlled in shoulder  Follow-up at [facility] either with Dr Bonner in [place] or shoulder provider in [place] 2 weeks following your injury for follow-up xrays.  Call [phone] for appointment/questions.              Chief Complaint:   Right shoulder pain          History of Present Illness:   Tello Jones is a 39 year old male who has a past medical history of ongoing active heavy alcohol abuse, recurrent alcohol withdrawal seizures, history of splenic laceration in December 2024, fracture of thoracic and lumbar's vertebrae in December 2024 following alcohol withdrawal seizure, acid reflux, failure to maintain sobriety and recurrent falls.    Per medical records, patient's last drink was on 03/18/2025 and he started taking his Antabuse.  He had an alcohol withdrawal seizure which was witnessed by his significant other.    Workup in the ER revealed him to have evidence of anterior dislocation of the right shoulder.  Shoulder was reduced in the ED and immobilizer applied.      CT of shoulder:  1. Comminuted and mildly displaced fracture of the proximal right humerus.  2. Comminuted and displaced fracture fragment along the inferior rim  of the right glenoid.          Past Medical History:   No past medical history on file.          Past Surgical History:   No past surgical history on file.          Social History:     Social History     Tobacco Use    Smoking status: Not on file    Smokeless tobacco: Not on file   Substance Use Topics    Alcohol use: Not on file             Family History:   No family history on file.          Immunizations:     VACCINE/DOSE   Diptheria   DPT   DTAP   HBIG   Hepatitis A   Hepatitis B   HIB   Influenza   Measles   Meningococcal   MMR   Mumps   Pneumococcal   Polio   Rubella   Small Pox   TDAP   Varicella   Zoster             Allergies:     Allergies   Allergen Reactions    Cefaclor Rash             Medications:     Current Facility-Administered Medications   Medication Dose Route Frequency Provider Last Rate Last Admin    acetaminophen (TYLENOL) tablet 650 mg  650 mg Oral Q4H PRN Gabriela Muniz MD   650 mg at 03/19/25 2345    Or    acetaminophen (TYLENOL) Suppository 650 mg  650 mg Rectal Q4H PRN Gabriela Muniz MD        amitriptyline (ELAVIL) tablet 50 mg  50 mg Oral At Bedtime Gabriela Muniz MD        benzocaine-menthol (CHLORASEPTIC) 6-10 MG lozenge 1 lozenge  1 lozenge Buccal Q1H PRN Gabriela Muniz MD        calcium carbonate (TUMS) chewable tablet 1,000 mg  1,000 mg Oral 4x Daily PRN Gabriela Muniz MD        cloNIDine (CATAPRES) tablet 0.1 mg  0.1 mg Oral Q8H Gabriela Muniz MD   0.1 mg at 03/20/25 0448    diazepam (VALIUM) tablet 10 mg  10 mg Oral Q30 Min PRN Gabriela Muniz MD   10 mg at 03/20/25 0206    Or    diazepam (VALIUM) injection 5-10 mg  5-10 mg Intravenous Q30 Min PRN Gabriela Muniz MD        famotidine (PEPCID) tablet 20 mg  20 mg Oral BID Gabriela Muniz MD   20 mg at 03/20/25 0929    flumazenil (ROMAZICON) injection 0.2 mg  0.2 mg Intravenous q1 min prn Gabriela Muniz MD        folic acid (FOLVITE) tablet 1 mg  1 mg Oral Daily Gabriela Muniz MD   1 mg at 03/20/25 0928    [START ON  3/27/2025] gabapentin (NEURONTIN) capsule 100 mg  100 mg Oral Q8H Gabriela Muniz MD        [START ON 3/25/2025] gabapentin (NEURONTIN) capsule 300 mg  300 mg Oral Q8H Gabriela Muniz MD        [START ON 3/23/2025] gabapentin (NEURONTIN) capsule 600 mg  600 mg Oral Q8H Gabriela Muniz MD        gabapentin (NEURONTIN) capsule 900 mg  900 mg Oral Q8H Gabriela Muniz MD   900 mg at 03/20/25 0447    OLANZapine zydis (zyPREXA) ODT tab 5-10 mg  5-10 mg Oral Q6H PRN Gabriela Muniz MD        Or    haloperidol lactate (HALDOL) injection 2.5-5 mg  2.5-5 mg Intravenous Q6H PRN Gabriela Muniz MD        lidocaine (LMX4) cream   Topical Q1H PRN Gabriela Muniz MD        lidocaine 1 % 0.1-1 mL  0.1-1 mL Other Q1H PRN Gabriela Muniz MD        melatonin tablet 5 mg  5 mg Oral At Bedtime Gabriela Muniz MD   5 mg at 03/19/25 2345    miconazole (MICATIN) 2 % powder   Topical BID PRN Gabriela Muniz MD        multivitamin w/minerals (THERA-VIT-M) tablet 1 tablet  1 tablet Oral Daily Gabriela Muniz MD   1 tablet at 03/20/25 0953    naloxone (NARCAN) injection 0.2 mg  0.2 mg Intravenous Q2 Min PRN Gabriela Muniz MD        Or    naloxone (NARCAN) injection 0.4 mg  0.4 mg Intravenous Q2 Min PRN Gabriela Muniz MD        Or    naloxone (NARCAN) injection 0.2 mg  0.2 mg Intramuscular Q2 Min PRN Gabriela Muniz MD        Or    naloxone (NARCAN) injection 0.4 mg  0.4 mg Intramuscular Q2 Min PRN Gabriela Muniz MD        ondansetron (ZOFRAN ODT) ODT tab 4 mg  4 mg Oral Q6H PRN Gabriela Muniz MD        Or    ondansetron (ZOFRAN) injection 4 mg  4 mg Intravenous Q6H PRN Gabriela Muniz MD        oxyCODONE (ROXICODONE) tablet 5 mg  5 mg Oral Q4H PRN Indiana Pierre MD   5 mg at 03/20/25 0928    oxyCODONE IR (ROXICODONE) half-tab 2.5 mg  2.5 mg Oral Q4H PRN Indiana Pierre MD   2.5 mg at 03/20/25 0034    Patient is already receiving mechanical prophylaxis   Does not apply Continuous PRN Gabriela Muniz MD        polyethylene glycol (MIRALAX)  Packet 17 g  17 g Oral Daily Gabriela Muniz MD        prochlorperazine (COMPAZINE) injection 10 mg  10 mg Intravenous Q6H PRN Gabriela Muniz MD        Or    prochlorperazine (COMPAZINE) tablet 10 mg  10 mg Oral Q6H PRN Gabriela Muniz MD        propofol (DIPRIVAN) injection 10 mg/mL vial  30 mg Intravenous Q2 Min PRN Gabriela Muniz MD   20 mg at 03/19/25 2048    senna-docusate (SENOKOT-S/PERICOLACE) 8.6-50 MG per tablet 1 tablet  1 tablet Oral BID PRN Gabriela Muniz MD        Or    senna-docusate (SENOKOT-S/PERICOLACE) 8.6-50 MG per tablet 2 tablet  2 tablet Oral BID PRN Gabriela Muniz MD        sodium chloride (PF) 0.9% PF flush 3 mL  3 mL Intracatheter Q8H Gabriela Muniz MD        sodium chloride (PF) 0.9% PF flush 3 mL  3 mL Intracatheter q1 min prn Gabriela Muniz MD        thiamine (B-1) tablet 100 mg  100 mg Oral Daily Gabriela Muniz MD   100 mg at 03/20/25 0929    traZODone (DESYREL) tablet 200 mg  200 mg Oral At Bedtime Gabriela Muniz MD                 Review of Systems:   ROS:  10 point ROS neg other than the symptoms noted above in the HPI.            Physical Exam:   All vitals have been reviewed  Patient Vitals for the past 24 hrs:   BP Temp Temp src Pulse Resp SpO2 Height Weight   03/20/25 0756 116/80 98.1  F (36.7  C) Oral 85 16 98 % -- --   03/20/25 0448 130/84 -- -- -- -- -- -- --   03/19/25 2257 (!) 158/94 98  F (36.7  C) Oral 71 16 98 % 1.829 m (6') 86.2 kg (190 lb)   03/19/25 2200 (!) 154/101 -- -- 80 -- 100 % -- --   03/19/25 2145 (!) 147/102 -- -- 80 -- 99 % -- --   03/19/25 2130 (!) 159/104 -- -- 81 -- 100 % -- --   03/19/25 2115 (!) 154/99 -- -- 80 -- 99 % -- --   03/19/25 2109 (!) 141/105 -- -- 85 -- 100 % -- --   03/19/25 2106 (!) 151/101 -- -- 80 -- 99 % -- --   03/19/25 2104 -- -- -- 84 -- 99 % -- --   03/19/25 2103 (!) 147/97 -- -- 82 -- 98 % -- --   03/19/25 2102 (!) 147/98 -- -- 82 -- 98 % -- --   03/19/25 2100 (!) 147/98 -- -- 88 -- 97 % -- --   03/19/25 2058 (!) 147/98 -- --  84 -- -- -- --   03/19/25 2057 (!) 149/90 -- -- 85 -- 98 % -- --   03/19/25 2055 (!) 146/101 -- -- 85 -- -- -- --   03/19/25 2054 (!) 146/101 -- -- 81 -- 96 % -- --   03/19/25 2051 (!) 127/96 -- -- 88 -- -- -- --   03/19/25 2050 (!) 136/97 -- -- 90 -- -- -- --   03/19/25 2049 -- -- -- 93 -- -- -- --   03/19/25 2048 (!) 136/97 -- -- 88 -- -- -- --   03/19/25 2045 (!) 148/104 -- -- 89 -- -- -- --   03/19/25 2042 (!) 140/92 -- -- 96 -- -- -- --   03/19/25 2041 -- -- -- 90 -- -- -- --   03/19/25 2040 -- -- -- 98 -- -- -- --   03/19/25 2039 -- -- -- 98 -- -- -- --   03/19/25 2038 -- -- -- 99 -- -- -- --   03/19/25 2037 -- -- -- 98 -- -- -- --   03/19/25 2036 (!) 136/121 -- -- 98 -- -- -- --   03/19/25 2035 -- -- -- 102 -- -- -- --   03/19/25 2034 -- -- -- 105 -- -- -- --   03/19/25 2030 -- -- -- -- -- 100 % -- --   03/19/25 2030 (!) 154/102 -- -- 101 -- -- -- --   03/19/25 2027 (!) 151/105 -- -- 100 -- -- -- --   03/19/25 2024 (!) 150/108 -- -- 101 -- -- -- --   03/19/25 2023 -- -- -- 98 -- -- -- --   03/19/25 2022 -- -- -- 100 -- -- -- --   03/19/25 2021 (!) 150/104 -- -- 100 -- -- -- --   03/19/25 1821 (!) 141/86 -- -- 98 -- -- -- --   03/19/25 1758 (!) 141/86 97.9  F (36.6  C) Oral 101 18 100 % -- --       Intake/Output Summary (Last 24 hours) at 3/20/2025 1055  Last data filed at 3/19/2025 2058  Gross per 24 hour   Intake 0 ml   Output 0 ml   Net 0 ml         Physical Exam   Temp: 98.1  F (36.7  C) Temp src: Oral BP: 116/80 Pulse: 85   Resp: 16 SpO2: 98 % O2 Device: None (Room air)    Vital Signs with Ranges  Temp:  [97.9  F (36.6  C)-98.1  F (36.7  C)] 98.1  F (36.7  C)  Pulse:  [] 85  Resp:  [16-18] 16  BP: (116-159)/() 116/80  SpO2:  [96 %-100 %] 98 %  190 lbs 0 oz    Constitutional: Alert, appropriate, following commands.  HEENT: Head atraumatic normocephalic.   Respiratory: Unlabored breathing no audible wheeze  Cardiovascular: Regular rate and rhythm per pulses  GI: Abdomen  non-distended.  Lymph/Hematologic: No lymphadenopathy in areas examined  Genitourinary:  No valerio  Skin: No rashes, no cyanosis, no edema.  Musculoskeletal: On physical exam of the right humerus: The skin is intact.  There is swelling along the right proximal humerus. Right shoulder effusion present.  Tender to palpation along the shoulder joint and proximal humerus. Patient is able to flex and extend her wrist and fingers and abduct the right thumb without difficulty.  Sensation and pulses are intact and equal bilaterally.  Brisk cap refill.  SILT.    Neurologic: normal without focal findings  Neuropsychiatric: stable            Data:   All laboratory data reviewed  Results for orders placed or performed during the hospital encounter of 03/19/25   -Dislocation - Upper Extremity     Status: None    Narrative    Jean Prather MD     3/19/2025  9:08 PM  [facility]    -Dislocation - Upper Extremity    Date/Time: 3/19/2025 9:03 PM    Performed by: Jean Prather MD  Authorized by: Jean Prather MD    Risks, benefits and alternatives discussed.    ED EVALUATION:      Assessment Time: 3/19/2025 8:33 PM      I have performed an Emergency Department Evaluation including taking a   history and physical examination, this evaluation will be documented in   the electronic medical record for this ED encounter.     Indication: Right shoulder dislocation with associated fracture    ASA Class: Class 2- mild systemic disease, no acute problems, no   functional limitations    Mallampati: Grade 1- soft palate, uvula, tonsillar pillars, and   posterior pharyngeal wall visible    NPO Status: not NPO, emergent situation    UNIVERSAL PROTOCOL   Site Marked: NA  Prior Images Obtained and Reviewed:  Yes  Required items: Required blood products, implants, devices and special   equipment available    Patient identity confirmed:  Provided demographic data, arm band and   verbally with  patient  Patient was reevaluated immediately before administering moderate or deep   sedation or anesthesia  Confirmation Checklist:  Patient's identity using two indicators, relevant   allergies, procedure was appropriate and matched the consent or emergent   situation and correct equipment/implants were available  Time out: Immediately prior to the procedure a time out was called    Universal Protocol: the Joint Commission Universal Protocol was followed      LOCATION     Location:  Shoulder    Shoulder location:  R shoulder    Shoulder dislocation type: anterior      Chronicity:  New    PRE PROCEDURE ASSESSMENT      Pre-procedure imaging:  X-ray    Imaging findings: dislocation present and fracture present      Fracture of greater humeral tuberosity: yes      Hill-Sachs deformity: yes      Distal perfusion: normal      SEDATION  Patient Sedated: Yes    Sedation Type:  Moderate (conscious) sedation  Sedation:  Propofol  Vital signs: Vital signs monitored during sedation      ANESTHESIA (see MAR for exact dosages)      Anesthesia method:  None    PROCEDURE DETAILS      Manipulation performed: yes      Shoulder reduction method:  Traction and counter traction    Reduction successful: yes      Reduction confirmed with imaging: yes      Immobilization:  Brace    Supplies used: Shoulder immobilizer.    POST PROCEDURE DETAILS     Neurological function: normal      Distal perfusion: normal      Range of motion: improved        PROCEDURE    Patient Tolerance:  Patient tolerated the procedure well with no immediate   complications  Length of time physician/provider present for 1:1 monitoring during   sedation: 25   -Laceration Repair     Status: None    Narrative    Jean Prather MD     3/19/2025  9:08 PM  [facility]    -Laceration Repair    Date/Time: 3/19/2025 9:06 PM    Performed by: Jean Prather MD  Authorized by: Jean Prather MD    Emergent condition/consent  implied      ANESTHESIA (see MAR for exact dosages):     Anesthesia method:  Local infiltration    Local anesthetic:  Lidocaine 1% WITH epi  LACERATION DETAILS     Location:  Scalp    Scalp location:  R parietal    Length (cm):  5    REPAIR TYPE:     Repair type:  Simple    EXPLORATION:     Hemostasis achieved with:  Epinephrine    Wound exploration: wound explored through full range of motion and   entire depth of wound probed and visualized      Wound extent: no tendon damage, no underlying fracture and no vascular   damage      Wound extent comment:  Involves a very superior posterior aspect of the   pinna    Contaminated: no      TREATMENT:     Amount of cleaning:  Standard    Irrigation solution:  Tap water    Irrigation method:  Tap    Visualized foreign bodies/material removed: no      SKIN REPAIR     Repair method:  Sutures    Suture size:  5-0    Wound skin closure material used: Rapid dissolving Vicryl.    Suture technique:  Running    APPROXIMATION     Approximation:  Close    POST-PROCEDURE DETAILS     Dressing:  Open (no dressing)      PROCEDURE    Patient Tolerance:  Patient tolerated the procedure well with no immediate   complications   Head CT w/o contrast     Status: None    Narrative    EXAM: CT HEAD W/O CONTRAST  LOCATION: [facility]  DATE: 3/19/2025    INDICATION: fall, Sz, etoh w d?  COMPARISON: None.  TECHNIQUE: Routine CT Head without IV contrast. Multiplanar reformats. Dose reduction techniques were used.    FINDINGS:  INTRACRANIAL CONTENTS: No evidence of acute intracranial hemorrhage or mass effect. Brain attenuation and morphology are normal. The ventricles and sulci are normal for age. Normal gray-white matter differentiation. The basilar cisterns are patent.    VISUALIZED ORBITS/SINUSES/MASTOIDS: The globes are unremarkable. The partially imaged paranasal sinuses, mastoid air cells and middle ear cavities are unremarkable.     BONES/SOFT TISSUES: The visualized  skull base and calvarium are unremarkable.      Impression    IMPRESSION:    1.  No evidence of acute intracranial hemorrhage or mass effect.   Humerus XR, G/E 2 views, right     Status: None    Narrative    EXAM: XR HUMERUS RIGHT G/E 2 VIEWS, XR SHOULDER RIGHT 2 VIEWS  LOCATION: [facility]  DATE: 3/19/2025    INDICATION: fall, pain  COMPARISON: None.      Impression    IMPRESSION:     There is anterior dislocation of the right humeral head relative to the glenoid, with an associated acute, displaced fracture of the posterosuperior humeral head involving the greater tuberosity and extending vertically into the humeral neck. Question a   small acute fracture of the anterior glenoid as well. Postreduction radiographs would be helpful.   CT Cervical Spine w/o Contrast     Status: None    Narrative    EXAM: CT CERVICAL SPINE W/O CONTRAST  LOCATION: [facility]  DATE: 3/19/2025    INDICATION: etoh withdrawal, seizure  COMPARISON: None.  TECHNIQUE: Routine CT Cervical Spine without IV contrast. Multiplanar reformats. Dose reduction techniques were used.    FINDINGS:  No evidence of acute fracture or subluxation of the cervical spine by CT imaging. The vertebral bodies of the cervical spine have normal stature and alignment. The disc spaces are well-maintained. No significant degenerative changes. Soft tissues   unremarkable. No extraspinal abnormality.     Craniovertebral junction and C1-C2: Normal.    C2-C3: Normal disc height. No herniation. Normal facets. No spinal canal or neural foraminal stenosis.     C3-C4: Normal disc height. No herniation. Normal facets. No spinal canal or neural foraminal stenosis.     C4-C5: Normal disc height. No herniation. Normal facets. No spinal canal or neural foraminal stenosis.     C5-C6: Normal disc height. No herniation. Normal facets. No spinal canal or neural foraminal stenosis.     C6-C7: Normal disc height. No herniation. Normal facets.  No spinal canal or neural foraminal stenosis.     C7-T1: Normal disc height. No herniation. Normal facets. No spinal canal or neural foraminal stenosis.       Impression    IMPRESSION:  1.  No evidence of acute fracture or subluxation of the cervical spine by CT imaging.   XR Shoulder Right 2 Views     Status: None    Narrative    EXAM: XR HUMERUS RIGHT G/E 2 VIEWS, XR SHOULDER RIGHT 2 VIEWS  LOCATION: [facility]  DATE: 3/19/2025    INDICATION: fall, pain  COMPARISON: None.      Impression    IMPRESSION:     There is anterior dislocation of the right humeral head relative to the glenoid, with an associated acute, displaced fracture of the posterosuperior humeral head involving the greater tuberosity and extending vertically into the humeral neck. Question a   small acute fracture of the anterior glenoid as well. Postreduction radiographs would be helpful.   Shoulder XR, right, 2-3 vw PORTABLE     Status: None    Narrative    EXAM: XR SHOULDER RIGHT PORT G/E 2 VIEWS  LOCATION: [facility]  DATE: 3/19/2025    INDICATION: reduction  COMPARISON: None.      Impression    IMPRESSION:     The previously seen right shoulder dislocation has been successfully reduced. There is improved alignment of the proximal right humerus fracture which involves the greater tuberosity and extends vertically into the neck. There is also a small   nondisplaced fracture off of the anteroinferior glenoid.   CT Shoulder Right w/o Contrast     Status: None    Narrative    EXAM: CT SHOULDER RIGHT W/O CONTRAST  LOCATION: [facility]  DATE: 3/19/2025    INDICATION: Right shoulder dislocation with fracture.  COMPARISON: 3/19/2025.  TECHNIQUE: Noncontrast. Axial, sagittal and coronal thin-section reconstruction. Dose reduction techniques were used.     FINDINGS:     There is a comminuted and mildly displaced fracture of the proximal right humerus, with mild impaction of the  posterolateral humeral head, consistent with a superimposed Hill-Sachs morphology. Fracture components extend to involve the greater and lesser   tuberosities, as well as the posterior anterior aspect of the proximal humeral metadiaphysis. Humeral articular surface appears spared.     There are associated comminuted and mild to moderately displaced fracture fragment along the inferior rim of the glenoid.    Associated soft tissue swelling within the deep right shoulder soft tissues. Small elbow joint effusion.    Acromioclavicular and glenohumeral joints appear normal. No suspicious lytic or blastic osseous lesions.    Rotator cuff musculature is normal in bulk and signal attenuation.      Impression    IMPRESSION:    1. Comminuted and mildly displaced fracture of the proximal right humerus.  2. Comminuted and displaced fracture fragment along the inferior rim of the right glenoid.     Basic metabolic panel     Status: Abnormal   Result Value Ref Range    Sodium 135 135 - 145 mmol/L    Potassium 3.9 3.4 - 5.3 mmol/L    Chloride 92 (L) 98 - 107 mmol/L    Carbon Dioxide (CO2) 19 (L) 22 - 29 mmol/L    Anion Gap 24 (H) 7 - 15 mmol/L    Urea Nitrogen 6.0 6.0 - 20.0 mg/dL    Creatinine 0.88 0.67 - 1.17 mg/dL    GFR Estimate >90 >60 mL/min/1.73m2    Calcium 10.3 8.8 - 10.4 mg/dL    Glucose 192 (H) 70 - 99 mg/dL   Hepatic function panel     Status: Abnormal   Result Value Ref Range    Protein Total 8.7 (H) 6.4 - 8.3 g/dL    Albumin 4.9 3.5 - 5.2 g/dL    Bilirubin Total 1.2 <=1.2 mg/dL    Alkaline Phosphatase 88 40 - 150 U/L     (H) 0 - 45 U/L     (H) 0 - 70 U/L    Bilirubin Direct 0.44 (H) 0.00 - 0.30 mg/dL   Lipase     Status: Abnormal   Result Value Ref Range    Lipase 10 (L) 13 - 60 U/L   Ethyl Alcohol Level     Status: Normal   Result Value Ref Range    Alcohol ethyl <0.01 <=0.01 g/dL   Magnesium     Status: Normal   Result Value Ref Range    Magnesium 2.1 1.7 - 2.3 mg/dL   CBC with platelets and  differential     Status: Abnormal   Result Value Ref Range    WBC Count 7.6 4.0 - 11.0 10e3/uL    RBC Count 4.62 4.40 - 5.90 10e6/uL    Hemoglobin 15.6 13.3 - 17.7 g/dL    Hematocrit 44.7 40.0 - 53.0 %    MCV 97 78 - 100 fL    MCH 33.8 (H) 26.5 - 33.0 pg    MCHC 34.9 31.5 - 36.5 g/dL    RDW 12.1 10.0 - 15.0 %    Platelet Count 159 150 - 450 10e3/uL    % Neutrophils 78 %    % Lymphocytes 14 %    % Monocytes 7 %    % Eosinophils 0 %    % Basophils 0 %    % Immature Granulocytes 0 %    NRBCs per 100 WBC 0 <1 /100    Absolute Neutrophils 5.9 1.6 - 8.3 10e3/uL    Absolute Lymphocytes 1.1 0.8 - 5.3 10e3/uL    Absolute Monocytes 0.5 0.0 - 1.3 10e3/uL    Absolute Eosinophils 0.0 0.0 - 0.7 10e3/uL    Absolute Basophils 0.0 0.0 - 0.2 10e3/uL    Absolute Immature Granulocytes 0.0 <=0.4 10e3/uL    Absolute NRBCs 0.0 10e3/uL   [facility] Draw     Status: None    Narrative    The following orders were created for panel order [facility] Draw.  Procedure                               Abnormality         Status                     ---------                               -----------         ------                     Extra Blue Top Tube[[id number]]                             Final result               Extra Red Top Tube[[id number]]                              Final result                 Please view results for these tests on the individual orders.   Extra Blue Top Tube     Status: None   Result Value Ref Range    Hold Specimen JIC    Extra Red Top Tube     Status: None   Result Value Ref Range    Hold Specimen JIC    Phosphorus     Status: Abnormal   Result Value Ref Range    Phosphorus 2.3 (L) 2.5 - 4.5 mg/dL   Comprehensive metabolic panel     Status: Abnormal   Result Value Ref Range    Sodium 135 135 - 145 mmol/L    Potassium 3.2 (L) 3.4 - 5.3 mmol/L    Carbon Dioxide (CO2) 24 22 - 29 mmol/L    Anion Gap 10 7 - 15 mmol/L    Urea Nitrogen 5.8 (L) 6.0 - 20.0 mg/dL    Creatinine 0.68 0.67 - 1.17 mg/dL    GFR Estimate >90 >60  mL/min/1.73m2    Calcium 8.1 (L) 8.8 - 10.4 mg/dL    Chloride 101 98 - 107 mmol/L    Glucose 85 70 - 99 mg/dL    Alkaline Phosphatase 60 40 - 150 U/L    AST 68 (H) 0 - 45 U/L    ALT 64 0 - 70 U/L    Protein Total 6.2 (L) 6.4 - 8.3 g/dL    Albumin 3.5 3.5 - 5.2 g/dL    Bilirubin Total 1.2 <=1.2 mg/dL   CBC with platelets and differential     Status: Abnormal   Result Value Ref Range    WBC Count 5.3 4.0 - 11.0 10e3/uL    RBC Count 3.32 (L) 4.40 - 5.90 10e6/uL    Hemoglobin 11.3 (L) 13.3 - 17.7 g/dL    Hematocrit 32.9 (L) 40.0 - 53.0 %    MCV 99 78 - 100 fL    MCH 34.0 (H) 26.5 - 33.0 pg    MCHC 34.3 31.5 - 36.5 g/dL    RDW 12.2 10.0 - 15.0 %    Platelet Count 95 (L) 150 - 450 10e3/uL    % Neutrophils 76 %    % Lymphocytes 15 %    % Monocytes 8 %    % Eosinophils 1 %    % Basophils 0 %    % Immature Granulocytes 1 %    NRBCs per 100 WBC 0 <1 /100    Absolute Neutrophils 4.0 1.6 - 8.3 10e3/uL    Absolute Lymphocytes 0.8 0.8 - 5.3 10e3/uL    Absolute Monocytes 0.4 0.0 - 1.3 10e3/uL    Absolute Eosinophils 0.0 0.0 - 0.7 10e3/uL    Absolute Basophils 0.0 0.0 - 0.2 10e3/uL    Absolute Immature Granulocytes 0.0 <=0.4 10e3/uL    Absolute NRBCs 0.0 10e3/uL   EKG 12-lead, tracing only     Status: None   Result Value Ref Range    Systolic Blood Pressure  mmHg    Diastolic Blood Pressure  mmHg    Ventricular Rate 101 BPM    Atrial Rate 101 BPM    MS Interval 174 ms    QRS Duration 86 ms     ms    QTc 459 ms    P Axis 32 degrees    R AXIS 50 degrees    T Axis 28 degrees    Interpretation ECG       Sinus tachycardia  Possible Anterior infarct , age undetermined  Abnormal ECG  When compared with ECG of 11-Sep-2024 17:45,  Borderline criteria for Anterior infarct are now Present  Unconfirmed report - interpretation of this ECG is computer generated - see medical record for final interpretation  Confirmed by - EMERGENCY ROOM, PHYSICIAN ([id number]),  Adan Yuan ([id number]) on 3/20/2025 8:08:38 AM     CBC with platelets  differential     Status: Abnormal    Narrative    The following orders were created for panel order CBC with platelets differential.  Procedure                               Abnormality         Status                     ---------                               -----------         ------                     CBC with platelets and ...[[id number]]  Abnormal            Final result                 Please view results for these tests on the individual orders.   CBC with platelets differential     Status: Abnormal    Narrative    The following orders were created for panel order CBC with platelets differential.  Procedure                               Abnormality         Status                     ---------                               -----------         ------                     CBC with platelets and ...[[id number]]  Abnormal            Final result                 Please view results for these tests on the individual orders.          Attestation:  I have reviewed today's vital signs, notes, medications, labs and imaging with Dr. Magallanes.  Amount of time performed on this consult: 45 minutes.    Keshia Payne PA-C

## 2025-03-20 NOTE — PLAN OF CARE
"Pt admitted following seizure at home with ETOH withdrawal. Per patient last drink was 3/18. CIWA 8, 10, 6. Given Valium per order. Has right arm fracture. Ortho following. Immobilizer on. CMS intact. Given Oxy and Tylenol. NS infusing. LPIV. Full Liquid diet. Tele SR.     Goal Outcome Evaluation:      Plan of Care Reviewed With: patient    Overall Patient Progress: no changeOverall Patient Progress: no change    Outcome Evaluation: Continues on CIWA. Pain control      Problem: Adult Inpatient Plan of Care  Goal: Plan of Care Review  Description: The Plan of Care Review/Shift note should be completed every shift.  The Outcome Evaluation is a brief statement about your assessment that the patient is improving, declining, or no change.  This information will be displayed automatically on your shiftnote.  Outcome: Progressing  Flowsheets (Taken 3/20/2025 0507)  Outcome Evaluation: Continues on CIWA. Pain control  Plan of Care Reviewed With: patient  Overall Patient Progress: no change  Goal: Patient-Specific Goal (Individualized)  Description: You can add care plan individualizations to a care plan. Examples of Individualization might be:  \"Parent requests to be called daily at 9am for status\", \"I have a hard time hearing out of my right ear\", or \"Do not touch me to wake me up as it startlesme\".  Outcome: Progressing  Goal: Absence of Hospital-Acquired Illness or Injury  Outcome: Progressing  Intervention: Identify and Manage Fall Risk  Recent Flowsheet Documentation  Taken 3/19/2025 2300 by Donya Rebolledo, RN  Safety Promotion/Fall Prevention:   activity supervised   supervised activity  Intervention: Prevent Skin Injury  Recent Flowsheet Documentation  Taken 3/19/2025 2300 by Donya Rebolledo RN  Body Position: position changed independently  Goal: Optimal Comfort and Wellbeing  Outcome: Progressing  Intervention: Monitor Pain and Promote Comfort  Recent Flowsheet Documentation  Taken 3/19/2025 2345 by " Donya Rebolledo, RN  Pain Management Interventions:   medication (see MAR)   cold applied   pillow support provided  Goal: Readiness for Transition of Care  Outcome: Progressing  Intervention: Mutually Develop Transition Plan  Recent Flowsheet Documentation  Taken 3/19/2025 2300 by Donya Rebolledo, RN  Equipment Currently Used at Home: none     Problem: Fall Injury Risk  Goal: Absence of Fall and Fall-Related Injury  Outcome: Progressing  Intervention: Promote Injury-Free Environment  Recent Flowsheet Documentation  Taken 3/19/2025 2300 by Donya Rebolledo RN  Safety Promotion/Fall Prevention:   activity supervised   supervised activity     Problem: Alcohol Withdrawal  Goal: Alcohol Withdrawal Symptom Control  Outcome: Progressing  Goal: Optimal Neurologic Function  Outcome: Progressing  Goal: Readiness for Change Identified  Outcome: Progressing     Problem: Comorbidity Management  Goal: Maintenance of Seizure Control  Outcome: Progressing

## 2025-03-20 NOTE — PHARMACY-ADMISSION MEDICATION HISTORY
Pharmacy Intern Admission Medication History    Admission medication history is complete. The information provided in this note is only as accurate as the sources available at the time of the update.    Information Source(s): Patient, Family member, and CareEverywhere/SureScripts via in-person    Pertinent Information: Acamprosate was filled on 12/27/24 for 30 days. Pt confirmed still taking it. Pt said he is not consistent with it    Changes made to PTA medication list:  Added: Acamprosate  Deleted: gabapentin and duplicated multivitamin  Changed: None    Allergies reviewed with patient and updates made in EHR: yes    Medication History Completed By: David Isaacs 3/19/2025 8:14 PM    PTA Med List   Medication Sig Last Dose/Taking    acamprosate (CAMPRAL) 333 MG EC tablet Take 666 mg by mouth 3 times daily. 3/19/2025 Morning    amitriptyline (ELAVIL) 50 MG tablet Take 50 mg by mouth at bedtime. 3/18/2025 Bedtime    multivitamin (CENTRUM SILVER) tablet Take 1 tablet by mouth daily 3/19/2025 Morning    pantoprazole (PROTONIX) 40 MG EC tablet Take 40 mg by mouth daily. 3/19/2025 Morning    sertraline (ZOLOFT) 100 MG tablet Take 150 mg by mouth daily. 3/19/2025 Morning    traZODone (DESYREL) 100 MG tablet Take 200 mg by mouth at bedtime. 3/18/2025 Bedtime

## 2025-03-20 NOTE — PROGRESS NOTES
[facility]  Hospitalist Progress Note  Wil Mckeon MD 03/20/2025    Reason for Stay (Diagnosis): ETOH withdrawal, seizure, R shoulder dislocation/fx         Assessment and Plan:      Summary of Stay: Tello Jones is a 39 year old male admitted on 3/19/2025. He has a past medical history of ongoing active heavy alcohol abuse, recurrent alcohol withdrawal seizures, alcohol-related pancreatitis, history of splenic laceration in December 2024, fracture of thoracic and lumbar's vertebrae in December 2024 following alcohol withdrawal seizure, acid reflux disease, failure to maintain sobriety and recurrent falls.     Patient was admitted after an alcohol withdrawal seizure, resulting in right shoulder dislocation and fracture.    Plans today:  -Continue CIWA protocol for alcohol withdrawl.  Receiving as needed Valium  -Continue thiamine and folate  -Continue gabapentin per withdrawal protocol for seizure ppx  -Orthopedics consultation to address shoulder dislocation and fracture  -Order potassium replacement protocol  -Order phosphorus replacement protocol    1/.  Alcohol withdrawal c/b seizure   -Continue CIWA protocol  -Continue gabapentin  -Advised complete alcohol cessation    2/.  Shoulder dislocation   - reduced in the emergency room  -Continue immobilizer    3/.  Right proximal humerus and R glenoid shoulder fracture:   -Await orthopedics input  - [facility] RUE  - RUE immobilizer    4/. Hypokalemia and hypophosphatemia  -Related to alcohol use  -supplement protocols ordered        DVT Prophylaxis: Pneumatic Compression Devices  Code Status: Full Code  Discharge Dispo: home  Medically Ready for Discharge: Anticipated in 2-4 Days after withdrawal sx resolved           Interval History (Subjective):      Patient presented with seizure in the setting of alcohol use.  Has a history of alcohol withdrawal seizure.  Injured his right shoulder.  Workup revealed shoulder dislocation (reduced in the ER)  complicated by shoulder fracture                   Physical Exam:      Last Vital Signs:  /80 (BP Location: Left arm)   Pulse 85   Temp 98.1  F (36.7  C) (Oral)   Resp 16   Ht 1.829 m (6')   Wt 86.2 kg (190 lb)   SpO2 98%   BMI 25.77 kg/m        Intake/Output Summary (Last 24 hours) at 3/20/2025 1001  Last data filed at 3/19/2025 2058  Gross per 24 hour   Intake 0 ml   Output 0 ml   Net 0 ml       Constitutional: Awake, alert, cooperative, no apparent distress.  Mild tremors noted     Respiratory: Clear to auscultation bilaterally, no crackles or wheezing   Cardiovascular: Regular rate and rhythm, normal S1 and S2, and no murmur noted   Abdomen: Normal bowel sounds, soft, non-distended, non-tender   Skin: No rashes, no cyanosis, dry to touch   Neuro: Alert and oriented x3, no weakness, numbness, memory loss   Extremities: No edema, normal range of motion.  RUE in immobilizer.  R hand warm, fingers mobile and sensistive to touch   Other(s):        All other systems: Negative          Medications:      All current medications were reviewed with changes reflected in problem list.         Data:      All new lab and imaging data was reviewed.   Labs:       Lab Results   Component Value Date     03/20/2025     03/19/2025     12/06/2024    Lab Results   Component Value Date    CHLORIDE 101 03/20/2025    CHLORIDE 92 03/19/2025    CHLORIDE 102 12/06/2024    CHLORIDE 94 10/21/2021    CHLORIDE 97 10/20/2021    CHLORIDE 96 10/19/2021    Lab Results   Component Value Date    BUN 5.8 03/20/2025    BUN 6.0 03/19/2025    BUN 5.1 12/06/2024    BUN 9 10/21/2021    BUN 13 10/20/2021    BUN 12 10/19/2021      Lab Results   Component Value Date    POTASSIUM 3.2 03/20/2025    POTASSIUM 3.9 03/19/2025    POTASSIUM 4.0 12/07/2024    POTASSIUM 4.0 10/21/2021    POTASSIUM 4.2 10/20/2021    POTASSIUM 3.8 10/19/2021    Lab Results   Component Value Date    CO2 24 03/20/2025    CO2 19 03/19/2025    CO2 23  12/06/2024    CO2 26 10/21/2021    CO2 25 10/20/2021    CO2 25 10/19/2021    Lab Results   Component Value Date    CR 0.68 03/20/2025    CR 0.88 03/19/2025    CR 0.64 12/06/2024        Recent Labs   Lab 03/20/25  0621   WBC 5.3   HGB 11.3*   HCT 32.9*   MCV 99   PLT 95*      Imaging:   Recent Results (from the past 24 hours)   Head CT w/o contrast    Narrative    EXAM: CT HEAD W/O CONTRAST  LOCATION: [facility]  DATE: 3/19/2025    INDICATION: fall, Sz, etoh w d?  COMPARISON: None.  TECHNIQUE: Routine CT Head without IV contrast. Multiplanar reformats. Dose reduction techniques were used.    FINDINGS:  INTRACRANIAL CONTENTS: No evidence of acute intracranial hemorrhage or mass effect. Brain attenuation and morphology are normal. The ventricles and sulci are normal for age. Normal gray-white matter differentiation. The basilar cisterns are patent.    VISUALIZED ORBITS/SINUSES/MASTOIDS: The globes are unremarkable. The partially imaged paranasal sinuses, mastoid air cells and middle ear cavities are unremarkable.     BONES/SOFT TISSUES: The visualized skull base and calvarium are unremarkable.      Impression    IMPRESSION:    1.  No evidence of acute intracranial hemorrhage or mass effect.   CT Cervical Spine w/o Contrast    Narrative    EXAM: CT CERVICAL SPINE W/O CONTRAST  LOCATION: [facility]  DATE: 3/19/2025    INDICATION: etoh withdrawal, seizure  COMPARISON: None.  TECHNIQUE: Routine CT Cervical Spine without IV contrast. Multiplanar reformats. Dose reduction techniques were used.    FINDINGS:  No evidence of acute fracture or subluxation of the cervical spine by CT imaging. The vertebral bodies of the cervical spine have normal stature and alignment. The disc spaces are well-maintained. No significant degenerative changes. Soft tissues   unremarkable. No extraspinal abnormality.     Craniovertebral junction and C1-C2: Normal.    C2-C3: Normal disc height. No  herniation. Normal facets. No spinal canal or neural foraminal stenosis.     C3-C4: Normal disc height. No herniation. Normal facets. No spinal canal or neural foraminal stenosis.     C4-C5: Normal disc height. No herniation. Normal facets. No spinal canal or neural foraminal stenosis.     C5-C6: Normal disc height. No herniation. Normal facets. No spinal canal or neural foraminal stenosis.     C6-C7: Normal disc height. No herniation. Normal facets. No spinal canal or neural foraminal stenosis.     C7-T1: Normal disc height. No herniation. Normal facets. No spinal canal or neural foraminal stenosis.       Impression    IMPRESSION:  1.  No evidence of acute fracture or subluxation of the cervical spine by CT imaging.   XR Shoulder Right 2 Views    Narrative    EXAM: XR HUMERUS RIGHT G/E 2 VIEWS, XR SHOULDER RIGHT 2 VIEWS  LOCATION: [facility]  DATE: 3/19/2025    INDICATION: fall, pain  COMPARISON: None.      Impression    IMPRESSION:     There is anterior dislocation of the right humeral head relative to the glenoid, with an associated acute, displaced fracture of the posterosuperior humeral head involving the greater tuberosity and extending vertically into the humeral neck. Question a   small acute fracture of the anterior glenoid as well. Postreduction radiographs would be helpful.   Humerus XR, G/E 2 views, right    Narrative    EXAM: XR HUMERUS RIGHT G/E 2 VIEWS, XR SHOULDER RIGHT 2 VIEWS  LOCATION: [facility]  DATE: 3/19/2025    INDICATION: fall, pain  COMPARISON: None.      Impression    IMPRESSION:     There is anterior dislocation of the right humeral head relative to the glenoid, with an associated acute, displaced fracture of the posterosuperior humeral head involving the greater tuberosity and extending vertically into the humeral neck. Question a   small acute fracture of the anterior glenoid as well. Postreduction radiographs would be helpful.   Shoulder  XR, right, 2-3 vw PORTABLE    Narrative    EXAM: XR SHOULDER RIGHT PORT G/E 2 VIEWS  LOCATION: [facility]  DATE: 3/19/2025    INDICATION: reduction  COMPARISON: None.      Impression    IMPRESSION:     The previously seen right shoulder dislocation has been successfully reduced. There is improved alignment of the proximal right humerus fracture which involves the greater tuberosity and extends vertically into the neck. There is also a small   nondisplaced fracture off of the anteroinferior glenoid.   CT Shoulder Right w/o Contrast    Narrative    EXAM: CT SHOULDER RIGHT W/O CONTRAST  LOCATION: [facility]  DATE: 3/19/2025    INDICATION: Right shoulder dislocation with fracture.  COMPARISON: 3/19/2025.  TECHNIQUE: Noncontrast. Axial, sagittal and coronal thin-section reconstruction. Dose reduction techniques were used.     FINDINGS:     There is a comminuted and mildly displaced fracture of the proximal right humerus, with mild impaction of the posterolateral humeral head, consistent with a superimposed Hill-Sachs morphology. Fracture components extend to involve the greater and lesser   tuberosities, as well as the posterior anterior aspect of the proximal humeral metadiaphysis. Humeral articular surface appears spared.     There are associated comminuted and mild to moderately displaced fracture fragment along the inferior rim of the glenoid.    Associated soft tissue swelling within the deep right shoulder soft tissues. Small elbow joint effusion.    Acromioclavicular and glenohumeral joints appear normal. No suspicious lytic or blastic osseous lesions.    Rotator cuff musculature is normal in bulk and signal attenuation.      Impression    IMPRESSION:    1. Comminuted and mildly displaced fracture of the proximal right humerus.  2. Comminuted and displaced fracture fragment along the inferior rim of the right glenoid.

## 2025-03-20 NOTE — PLAN OF CARE
/64 (BP Location: Left arm)   Pulse 82   Temp 98.2  F (36.8  C) (Oral)   Resp 19   Ht 1.829 m (6')   Wt 86.2 kg (190 lb)   SpO2 98%   BMI 25.77 kg/m      VSS, PT c/o pain to R shoulder with relief from PRN oxy. Ortho consult. CIWA negative. Tolerating regular diet.

## 2025-03-20 NOTE — H&P
[facility]    History and Physical - Hospitalist Service       Date of Admission:  3/19/2025    Assessment & Plan      Tello Jones is a 39 year old male admitted on 3/19/2025. He has a past medical history of ongoing active heavy alcohol abuse, recurrent alcohol withdrawal seizures, alcohol-related pancreatitis, history of splenic laceration in December 2024, fracture of thoracic and lumbar's vertebrae in December 2024 following alcohol withdrawal seizure, acid reflux disease, failure to maintain sobriety and recurrent falls.    Patient is being admitted after an alcohol withdrawal seizure, right shoulder dislocation and fracture.  1/.  Alcohol withdrawal seizure this seems to be a recurrent situation with this patient previous hospitalization in December was related to similar issue.  Had an EEG on that occasion which was unremarkable.  He was supposed to follow-up with outpatient neurology which she has unfortunately not done.  Will place seizure precautions, CIWA protocol.  Full liquid diet.  Telemetry monitoring  Electrolyte monitoring.  2/.  Shoulder dislocation reduced in the emergency room  3/.  Shoulder fracture: CT scan being obtained, orthopedic consult.  Immobilizer/sling.  Pain medications.  /.  Supportive cares: IV fluids, folic acid thiamine, Pepcid.  Care management consults.  Clearly patient will have to make choices which are important given the fact that he would like to continue to work in his current profession and this medical ailment secondary to alcohol abuse is clearly likely going to cause ongoing troubles and further injuries.        Diet:  Full liquid diet  DVT Prophylaxis: Pneumatic Compression Devices  Theodore Catheter: Not present  Lines: None     Cardiac Monitoring: ACTIVE order. Indication: ETOH  Code Status:  Full code    Clinically Significant Risk Factors Present on Admission          # Hypochloremia: Lowest Cl = 92 mmol/L in last 2 days, will monitor as  appropriate     # Anion Gap Metabolic Acidosis: Highest Anion Gap = 24 mmol/L in last 2 days, will monitor and treat as appropriate                           Disposition Plan     Medically Ready for Discharge: Anticipated in 2-4 Days           Gabriela Muniz MD  Hospitalist Service  [facility]  Securely message with [employer] (more info)  Text page via [facility] Paging/Directory     ______________________________________________________________________    Chief Complaint   Alcohol withdrawal seizure and shoulder pain    History is obtained from the electronic health record, emergency department physician, and patient's spouse    History of Present Illness   Tello Jones is a 39 year old male who has a past medical history of ongoing active heavy alcohol abuse, recurrent alcohol withdrawal seizures, history of splenic laceration in December 2024, fracture of thoracic and lumbar's vertebrae in December 2024 following alcohol withdrawal seizure, acid reflux disease, failure to maintain sobriety and recurrent falls.  Patient is being admitted through the emergency department where he presented with above issue.  Apparently last drink was yesterday started taking his Antabuse again and then had an alcohol withdrawal seizure which was witnessed by his significant other.  Patient was on the ground with a full-blown seizure, paramedics brought the patient to the emergency room was complaining of right shoulder pain in the postictal stage.  Workup in the ER revealed him to have evidence of anterior dislocation of the right shoulder,, concern for fracture of the shoulder as well.  Patient underwent reduction of the shoulder by the ER provider.  CT scan has been requested by orthopedics.  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  Most recent hospitalization was in December of this past year when he was admitted following a fall and  developed a splenic laceration as well as multiple vertebral fractures.  He did have a seizure at that time as well  Patient has not undergone any formal chemical dependency treatment.        Past Medical History    No past medical history on file.    Past Surgical History   No past surgical history on file.    Prior to Admission Medications   Prior to Admission Medications   Prescriptions Last Dose Informant Patient Reported? Taking?   acamprosate (CAMPRAL) 333 MG EC tablet 3/19/2025 Morning  Yes Yes   Sig: Take 666 mg by mouth 3 times daily.   amitriptyline (ELAVIL) 50 MG tablet 3/18/2025 Bedtime  Yes Yes   Sig: Take 50 mg by mouth at bedtime.   gabapentin (NEURONTIN) 300 MG capsule   No No   Sig: Take 1 capsule (300 mg) by mouth 3 times daily.   multivitamin (CENTRUM SILVER) tablet 3/19/2025 Morning  No Yes   Sig: Take 1 tablet by mouth daily   pantoprazole (PROTONIX) 40 MG EC tablet 3/19/2025 Morning  Yes Yes   Sig: Take 40 mg by mouth daily.   sertraline (ZOLOFT) 100 MG tablet 3/19/2025 Morning  Yes Yes   Sig: Take 150 mg by mouth daily.   traZODone (DESYREL) 100 MG tablet 3/18/2025 Bedtime  Yes Yes   Sig: Take 200 mg by mouth at bedtime.      Facility-Administered Medications: None        Review of Systems    The 10 point Review of Systems is negative other than noted in the HPI or here.     Social History   I have reviewed this patient's social history and updated it with pertinent information if needed.         Family History     Not avail      Allergies   Allergies   Allergen Reactions    Cefaclor Rash        Physical Exam   Vital Signs: Temp: 97.9  F (36.6  C) Temp src: Oral BP: (!) 141/86 Pulse: 98   Resp: 18 SpO2: 100 % O2 Device: None (Room air)    Weight: 0 lbs 0 oz    General Appearance: Awake oriented x3, drowsy, just received sedation for shoulder reduction  Respiratory: CTA  Cardiovascular: S1S2 RRR  GI: soft NT BS+   Skin: no rash or lesions  Other: R shoulder in sling  1+ LE edema     Medical  Decision Making       75 MINUTES SPENT BY ME on the date of service doing chart review, history, exam, documentation & further activities per the note.      Data   ------------------------- PAST 24 HR DATA REVIEWED -----------------------------------------------    I have personally reviewed the following data over the past 24 hrs:    7.6  \   15.6   / 159     135 92 (L) 6.0 /  192 (H)   3.9 19 (L) 0.88 \     ALT: 102 (H) AST: 132 (H) AP: 88 TBILI: 1.2   ALB: 4.9 TOT PROTEIN: 8.7 (H) LIPASE: 10 (L)       Imaging results reviewed over the past 24 hrs:   Recent Results (from the past 24 hours)   Head CT w/o contrast    Narrative    EXAM: CT HEAD W/O CONTRAST  LOCATION: [facility]  DATE: 3/19/2025    INDICATION: fall, Sz, etoh w d?  COMPARISON: None.  TECHNIQUE: Routine CT Head without IV contrast. Multiplanar reformats. Dose reduction techniques were used.    FINDINGS:  INTRACRANIAL CONTENTS: No evidence of acute intracranial hemorrhage or mass effect. Brain attenuation and morphology are normal. The ventricles and sulci are normal for age. Normal gray-white matter differentiation. The basilar cisterns are patent.    VISUALIZED ORBITS/SINUSES/MASTOIDS: The globes are unremarkable. The partially imaged paranasal sinuses, mastoid air cells and middle ear cavities are unremarkable.     BONES/SOFT TISSUES: The visualized skull base and calvarium are unremarkable.      Impression    IMPRESSION:    1.  No evidence of acute intracranial hemorrhage or mass effect.   CT Cervical Spine w/o Contrast    Narrative    EXAM: CT CERVICAL SPINE W/O CONTRAST  LOCATION: [facility]  DATE: 3/19/2025    INDICATION: etoh withdrawal, seizure  COMPARISON: None.  TECHNIQUE: Routine CT Cervical Spine without IV contrast. Multiplanar reformats. Dose reduction techniques were used.    FINDINGS:  No evidence of acute fracture or subluxation of the cervical spine by CT imaging. The vertebral bodies of  the cervical spine have normal stature and alignment. The disc spaces are well-maintained. No significant degenerative changes. Soft tissues   unremarkable. No extraspinal abnormality.     Craniovertebral junction and C1-C2: Normal.    C2-C3: Normal disc height. No herniation. Normal facets. No spinal canal or neural foraminal stenosis.     C3-C4: Normal disc height. No herniation. Normal facets. No spinal canal or neural foraminal stenosis.     C4-C5: Normal disc height. No herniation. Normal facets. No spinal canal or neural foraminal stenosis.     C5-C6: Normal disc height. No herniation. Normal facets. No spinal canal or neural foraminal stenosis.     C6-C7: Normal disc height. No herniation. Normal facets. No spinal canal or neural foraminal stenosis.     C7-T1: Normal disc height. No herniation. Normal facets. No spinal canal or neural foraminal stenosis.       Impression    IMPRESSION:  1.  No evidence of acute fracture or subluxation of the cervical spine by CT imaging.   XR Shoulder Right 2 Views    Narrative    EXAM: XR HUMERUS RIGHT G/E 2 VIEWS, XR SHOULDER RIGHT 2 VIEWS  LOCATION: [facility]  DATE: 3/19/2025    INDICATION: fall, pain  COMPARISON: None.      Impression    IMPRESSION:     There is anterior dislocation of the right humeral head relative to the glenoid, with an associated acute, displaced fracture of the posterosuperior humeral head involving the greater tuberosity and extending vertically into the humeral neck. Question a   small acute fracture of the anterior glenoid as well. Postreduction radiographs would be helpful.   Humerus XR, G/E 2 views, right    Narrative    EXAM: XR HUMERUS RIGHT G/E 2 VIEWS, XR SHOULDER RIGHT 2 VIEWS  LOCATION: [facility]  DATE: 3/19/2025    INDICATION: fall, pain  COMPARISON: None.      Impression    IMPRESSION:     There is anterior dislocation of the right humeral head relative to the glenoid, with an associated  acute, displaced fracture of the posterosuperior humeral head involving the greater tuberosity and extending vertically into the humeral neck. Question a   small acute fracture of the anterior glenoid as well. Postreduction radiographs would be helpful.

## 2025-03-20 NOTE — ED PROVIDER NOTES
History     Chief Complaint:  Seizures       HPI   Tello Jones is a 39 year old male     History of alcohol use disorder and a history of alcohol withdrawal seizures.  Admitted in December 2020 for for withdrawal and seizure at that time suffered injuries including splenic laceration and multiple transverse process fractures.    Daily heavy drinking, last drink yesterday.  Found on the floor having active seizure activity by girlfriend.  Arrives postictal, complaining of significant right shoulder pain.  Denies other coingestions or substance use.    Denies chest pain, shortness of breath, abdominal or flank pain.  Denies extremity injuries or pain other than the right shoulder.      Independent Historian:       Review of External Notes:   I reviewed discharge summary from 2024 admission in December for alcohol withdrawal and related injuries      Medications:    acamprosate (CAMPRAL) 333 MG EC tablet  amitriptyline (ELAVIL) 50 MG tablet  multivitamin (CENTRUM SILVER) tablet  pantoprazole (PROTONIX) 40 MG EC tablet  sertraline (ZOLOFT) 100 MG tablet  traZODone (DESYREL) 100 MG tablet            Physical Exam   Patient Vitals for the past 24 hrs:   BP Temp Temp src Pulse Resp SpO2   03/19/25 2103 -- -- -- 82 -- 98 %   03/19/25 2102 (!) 147/98 -- -- 82 -- 98 %   03/19/25 2100 (!) 147/98 -- -- 88 -- 97 %   03/19/25 2058 (!) 147/98 -- -- 84 -- --   03/19/25 2057 (!) 149/90 -- -- 85 -- 98 %   03/19/25 2055 (!) 146/101 -- -- 85 -- --   03/19/25 2054 (!) 146/101 -- -- 81 -- 96 %   03/19/25 2051 (!) 127/96 -- -- 88 -- --   03/19/25 2050 (!) 136/97 -- -- 90 -- --   03/19/25 2049 -- -- -- 93 -- --   03/19/25 2048 (!) 136/97 -- -- 88 -- --   03/19/25 2045 (!) 148/104 -- -- 89 -- --   03/19/25 2042 (!) 140/92 -- -- 96 -- --   03/19/25 2041 -- -- -- 90 -- --   03/19/25 2040 -- -- -- 98 -- --   03/19/25 2039 -- -- -- 98 -- --   03/19/25 2038 -- -- -- 99 -- --   03/19/25 2037 -- -- -- 98 -- --   03/19/25 2036 (!) 136/121 -- --  98 -- --   03/19/25 2035 -- -- -- 102 -- --   03/19/25 2034 -- -- -- 105 -- --   03/19/25 2030 -- -- -- -- -- 100 %   03/19/25 2030 (!) 154/102 -- -- 101 -- --   03/19/25 2027 (!) 151/105 -- -- 100 -- --   03/19/25 2024 (!) 150/108 -- -- 101 -- --   03/19/25 2023 -- -- -- 98 -- --   03/19/25 2022 -- -- -- 100 -- --   03/19/25 2021 (!) 150/104 -- -- 100 -- --   03/19/25 1821 (!) 141/86 -- -- 98 -- --   03/19/25 1758 (!) 141/86 97.9  F (36.6  C) Oral 101 18 100 %          HENT:  mmm, no rhinorrhea, midface stable, no trismus, no dental injury, horizontally oriented 5 cm wound of the right parietal occipital scalp abutting the superior aspect of the pinna  Eyes: periorbital tissues and sclera normal 4 mm bilaterally, no active nystagmus  Neck: supple, no abnormal swelling, no tenderness palpation, painless range of motion  Lungs:  CTAB,  no resp distress  CV: Tachycardic, no m/r/g, ppi  Abd: soft, nontender, nondistended, no rebound/masses/guarding/hsm  Ext: Skeletal survey unremarkable with exception of the right upper extremity which is in a sling.  Swelling and tenderness of the right shoulder with limited range of motion secondary to pain. Closed injury,, but soft, radial pulse 2 out of 4, cap refill less than 3 seconds, sensation tact throughout the hand, able to flex and extend all 5 digits without abnormality.  Able to flex and extend at the wrist without abnormality.  Skin: warm, dry, well perfused,   Neuro: Awake, currently alert, face symmetric, speech clear, no focal deficits in the extremities.          Emergency Department Course   ECG  ECG results from 03/19/25   EKG 12-lead, tracing only     Value    Systolic Blood Pressure     Diastolic Blood Pressure     Ventricular Rate 101    Atrial Rate 101    KS Interval 174    QRS Duration 86        QTc 459    P Axis 32    R AXIS 50    T Axis 28    Interpretation ECG      Sinus tachycardia  Possible Anterior infarct , age undetermined  Abnormal ECG  When  compared with ECG of 11-Sep-2024 17:45,  Borderline criteria for Anterior infarct are now Present             Imaging:  Shoulder XR, right, 2-3 vw PORTABLE   Final Result   IMPRESSION:       The previously seen right shoulder dislocation has been successfully reduced. There is improved alignment of the proximal right humerus fracture which involves the greater tuberosity and extends vertically into the neck. There is also a small    nondisplaced fracture off of the anteroinferior glenoid.      Humerus XR, G/E 2 views, right   Final Result   IMPRESSION:       There is anterior dislocation of the right humeral head relative to the glenoid, with an associated acute, displaced fracture of the posterosuperior humeral head involving the greater tuberosity and extending vertically into the humeral neck. Question a    small acute fracture of the anterior glenoid as well. Postreduction radiographs would be helpful.      XR Shoulder Right 2 Views   Final Result   IMPRESSION:       There is anterior dislocation of the right humeral head relative to the glenoid, with an associated acute, displaced fracture of the posterosuperior humeral head involving the greater tuberosity and extending vertically into the humeral neck. Question a    small acute fracture of the anterior glenoid as well. Postreduction radiographs would be helpful.      CT Cervical Spine w/o Contrast   Final Result   IMPRESSION:   1.  No evidence of acute fracture or subluxation of the cervical spine by CT imaging.      Head CT w/o contrast   Final Result   IMPRESSION:     1.  No evidence of acute intracranial hemorrhage or mass effect.      CT Shoulder Right w/o Contrast    (Results Pending)          Laboratory:  Labs Ordered and Resulted from Time of ED Arrival to Time of ED Departure   BASIC METABOLIC PANEL - Abnormal       Result Value    Sodium 135      Potassium 3.9      Chloride 92 (*)     Carbon Dioxide (CO2) 19 (*)     Anion Gap 24 (*)     Urea Nitrogen  6.0      Creatinine 0.88      GFR Estimate >90      Calcium 10.3      Glucose 192 (*)    HEPATIC FUNCTION PANEL - Abnormal    Protein Total 8.7 (*)     Albumin 4.9      Bilirubin Total 1.2      Alkaline Phosphatase 88       (*)      (*)     Bilirubin Direct 0.44 (*)    LIPASE - Abnormal    Lipase 10 (*)    CBC WITH PLATELETS AND DIFFERENTIAL - Abnormal    WBC Count 7.6      RBC Count 4.62      Hemoglobin 15.6      Hematocrit 44.7      MCV 97      MCH 33.8 (*)     MCHC 34.9      RDW 12.1      Platelet Count 159      % Neutrophils 78      % Lymphocytes 14      % Monocytes 7      % Eosinophils 0      % Basophils 0      % Immature Granulocytes 0      NRBCs per 100 WBC 0      Absolute Neutrophils 5.9      Absolute Lymphocytes 1.1      Absolute Monocytes 0.5      Absolute Eosinophils 0.0      Absolute Basophils 0.0      Absolute Immature Granulocytes 0.0      Absolute NRBCs 0.0     PHOSPHORUS - Abnormal    Phosphorus 2.3 (*)    ETHYL ALCOHOL LEVEL - Normal    Alcohol ethyl <0.01     MAGNESIUM - Normal    Magnesium 2.1          [facility]    -Dislocation - Upper Extremity    Date/Time: 3/19/2025 9:03 PM    Performed by: Jean Prather MD  Authorized by: Jean Prather MD    Risks, benefits and alternatives discussed.    ED EVALUATION:      Assessment Time: 3/19/2025 8:33 PM      I have performed an Emergency Department Evaluation including taking a history and physical examination, this evaluation will be documented in the electronic medical record for this ED encounter.     Indication: Right shoulder dislocation with associated fracture    ASA Class: Class 2- mild systemic disease, no acute problems, no functional limitations    Mallampati: Grade 1- soft palate, uvula, tonsillar pillars, and posterior pharyngeal wall visible    NPO Status: not NPO, emergent situation    UNIVERSAL PROTOCOL   Site Marked: NA  Prior Images Obtained and Reviewed:  Yes  Required items:  Required blood products, implants, devices and special equipment available    Patient identity confirmed:  Provided demographic data, arm band and verbally with patient  Patient was reevaluated immediately before administering moderate or deep sedation or anesthesia  Confirmation Checklist:  Patient's identity using two indicators, relevant allergies, procedure was appropriate and matched the consent or emergent situation and correct equipment/implants were available  Time out: Immediately prior to the procedure a time out was called    Universal Protocol: the Joint Commission Universal Protocol was followed      LOCATION     Location:  Shoulder    Shoulder location:  R shoulder    Shoulder dislocation type: anterior      Chronicity:  New    PRE PROCEDURE ASSESSMENT      Pre-procedure imaging:  X-ray    Imaging findings: dislocation present and fracture present      Fracture of greater humeral tuberosity: yes      Hill-Sachs deformity: yes      Distal perfusion: normal      SEDATION  Patient Sedated: Yes    Sedation Type:  Moderate (conscious) sedation  Sedation:  Propofol  Vital signs: Vital signs monitored during sedation      ANESTHESIA (see MAR for exact dosages)      Anesthesia method:  None    PROCEDURE DETAILS      Manipulation performed: yes      Shoulder reduction method:  Traction and counter traction    Reduction successful: yes      Reduction confirmed with imaging: yes      Immobilization:  Brace    Supplies used: Shoulder immobilizer.    POST PROCEDURE DETAILS     Neurological function: normal      Distal perfusion: normal      Range of motion: improved        PROCEDURE    Patient Tolerance:  Patient tolerated the procedure well with no immediate complications  Length of time physician/provider present for 1:1 monitoring during sedation: 25  M [facility]    -Laceration Repair    Date/Time: 3/19/2025 9:06 PM    Performed by: Jean Prather MD  Authorized by: Jean Prather  MD Mio    Emergent condition/consent implied      ANESTHESIA (see MAR for exact dosages):     Anesthesia method:  Local infiltration    Local anesthetic:  Lidocaine 1% WITH epi  LACERATION DETAILS     Location:  Scalp    Scalp location:  R parietal    Length (cm):  5    REPAIR TYPE:     Repair type:  Simple    EXPLORATION:     Hemostasis achieved with:  Epinephrine    Wound exploration: wound explored through full range of motion and entire depth of wound probed and visualized      Wound extent: no tendon damage, no underlying fracture and no vascular damage      Wound extent comment:  Involves a very superior posterior aspect of the pinna    Contaminated: no      TREATMENT:     Amount of cleaning:  Standard    Irrigation solution:  Tap water    Irrigation method:  Tap    Visualized foreign bodies/material removed: no      SKIN REPAIR     Repair method:  Sutures    Suture size:  5-0    Wound skin closure material used: Rapid dissolving Vicryl.    Suture technique:  Running    APPROXIMATION     Approximation:  Close    POST-PROCEDURE DETAILS     Dressing:  Open (no dressing)      PROCEDURE    Patient Tolerance:  Patient tolerated the procedure well with no immediate complications           Emergency Department Course & Assessments:    Interventions:  Medications   HYDROmorphone (PF) (DILAUDID) injection 0.3 mg (0.3 mg Intravenous $Given 3/19/25 2012)   diazepam (VALIUM) injection 5 mg (5 mg Intravenous $Given 3/19/25 2012)   propofol (DIPRIVAN) injection 10 mg/mL vial (20 mg Intravenous $Given 3/19/25 2048)   lidocaine 1 % 0.1-1 mL (has no administration in time range)   lidocaine (LMX4) cream (has no administration in time range)   sodium chloride (PF) 0.9% PF flush 3 mL (has no administration in time range)   sodium chloride (PF) 0.9% PF flush 3 mL (has no administration in time range)   acetaminophen (TYLENOL) tablet 650 mg (has no administration in time range)     Or   acetaminophen (TYLENOL) Suppository 650  mg (has no administration in time range)   polyethylene glycol (MIRALAX) Packet 17 g (has no administration in time range)   senna-docusate (SENOKOT-S/PERICOLACE) 8.6-50 MG per tablet 1 tablet (has no administration in time range)     Or   senna-docusate (SENOKOT-S/PERICOLACE) 8.6-50 MG per tablet 2 tablet (has no administration in time range)   ondansetron (ZOFRAN ODT) ODT tab 4 mg (has no administration in time range)     Or   ondansetron (ZOFRAN) injection 4 mg (has no administration in time range)   prochlorperazine (COMPAZINE) injection 10 mg (has no administration in time range)     Or   prochlorperazine (COMPAZINE) tablet 10 mg (has no administration in time range)   calcium carbonate (TUMS) chewable tablet 1,000 mg (has no administration in time range)   benzocaine-menthol (CHLORASEPTIC) 6-10 MG lozenge 1 lozenge (has no administration in time range)   miconazole (MICATIN) 2 % powder (has no administration in time range)   Patient is already receiving mechanical prophylaxis (has no administration in time range)   sodium chloride 0.9 % infusion (has no administration in time range)   melatonin tablet 5 mg (has no administration in time range)   famotidine (PEPCID) tablet 20 mg (has no administration in time range)   OLANZapine zydis (zyPREXA) ODT tab 5-10 mg (has no administration in time range)     Or   haloperidol lactate (HALDOL) injection 2.5-5 mg (has no administration in time range)   flumazenil (ROMAZICON) injection 0.2 mg (has no administration in time range)   melatonin tablet 5 mg (has no administration in time range)   cloNIDine (CATAPRES) tablet 0.1 mg (has no administration in time range)   flumazenil (ROMAZICON) injection 0.2 mg (has no administration in time range)   melatonin tablet 5 mg (has no administration in time range)   diazepam (VALIUM) tablet 10 mg (has no administration in time range)     Or   diazepam (VALIUM) injection 5-10 mg (has no administration in time range)   gabapentin  (NEURONTIN) tablet 1,200 mg (has no administration in time range)   gabapentin (NEURONTIN) capsule 900 mg (has no administration in time range)   gabapentin (NEURONTIN) capsule 600 mg (has no administration in time range)   gabapentin (NEURONTIN) capsule 300 mg (has no administration in time range)   gabapentin (NEURONTIN) capsule 100 mg (has no administration in time range)   multivitamin w/minerals (THERA-VIT-M) tablet 1 tablet (has no administration in time range)   thiamine (B-1) tablet 100 mg (has no administration in time range)   folic acid (FOLVITE) tablet 1 mg (has no administration in time range)   lidocaine 1% with EPINEPHrine 1:100,000 injection 5 mL (5 mLs Intradermal $Given 3/19/25 2006)   HYDROmorphone (PF) (DILAUDID) injection 0.5 mg (0.5 mg Intravenous $Given 3/19/25 1814)   diazepam (VALIUM) injection 5 mg (5 mg Intravenous $Given 3/19/25 1823)   lactated ringers BOLUS 1,000 mL (0 mLs Intravenous Stopped 3/19/25 2011)   folic acid injection 1 mg (1 mg Intravenous $Given 3/19/25 1945)   thiamine (B-1) injection 100 mg (100 mg Intravenous $Given 3/19/25 1946)   propofol (DIPRIVAN) injection 10 mg/mL vial (50 mg Intravenous $Given 3/19/25 2032)        Assessments:      Independent Interpretation (X-rays, CTs, rhythm strip):  To my read: Head CT negative for depressed skull fracture or intracranial hemorrhage  Right shoulder and humerus radiographs show a dislocation with associated fracture fragment of the humeral head  Postreduction radiographs show adequate reduction.  Consultations/Discussion of Management or Tests:  Admitting hospitalist  Orthopedics           Social Determinants of Health affecting care:       Disposition:  Admit     Impression & Plan    CMS Diagnoses:               MIPS (If applicable):             Medical Decision Making:    Alcohol use disorder presenting with alcohol withdrawal and related to seizure complicated by right shoulder dislocation and associated fracture.   Neurovascular structures are intact.  Right scalp/ear laceration repaired as above.  Remainder the skeletal survey was unremarkable.  Pre and post right upper extremity examination show adequate perfusion and sensation.  Able to perform thumbs up  and extend at the wrist without difficulty.    Admit to the hospital service for further management of withdrawal.  Placed in a shoulder immobilizer, orthopedics aware, requesting CT scan gout determine next steps.      Diagnosis:    ICD-10-CM    1. Alcohol withdrawal seizure with complication (H)  F10.939     R56.9       2. Shoulder dislocation, right, initial encounter  S43.004A       3. Closed displaced fracture of greater tuberosity of right humerus, initial encounter  S42.251A       4. Scalp laceration, initial encounter  S01.01XA            Discharge Medications:  New Prescriptions    No medications on file          Jean Prather MD  3/19/2025   Jean Prather, *        Jean Prather MD  03/19/25 [id number]

## 2025-03-20 NOTE — ED NOTES
"[facility]  ED Nurse Handoff Report    ED Chief complaint: Seizures  . ED Diagnosis:   Final diagnoses:   None       Allergies:   Allergies   Allergen Reactions    Cefaclor Rash       Code Status: Full Code    Activity level - Baseline/Home:  independent.  Activity Level - Current:   in bed.   Lift room needed: No.   Bariatric: No   Needed: No   Isolation: No.   Infection: Not Applicable.     Respiratory status: Room air    Vital Signs (within 30 minutes):   Vitals:    03/19/25 1758 03/19/25 1821   BP: (!) 141/86 (!) 141/86   Pulse: 101 98   Resp: 18    Temp: 97.9  F (36.6  C)    TempSrc: Oral    SpO2: 100%        Cardiac Rhythm:  ,   Cardiac  Cardiac Rhythm: Sinus tachycardia  Ectopy: None  Pain level:    Patient confused: No.   Patient Falls Risk: patient and family education, assistive device/personal items within reach, and activity supervised.   Elimination Status: Unable to void yet    Patient Report - Initial Complaint: Seizures.   Focused Assessment: Per MD note: \"Tello Jones is a 39 year old male      History of alcohol use disorder and a history of alcohol withdrawal seizures.  Admitted in December 2020 for for withdrawal and seizure at that time suffered injuries including splenic laceration and multiple transverse process fractures.     Daily heavy drinking, last drink yesterday.  Found on the floor having active seizure activity by girlfriend.  Arrives postictal, complaining of significant right shoulder pain.  Denies other coingestions or substance use.     Denies chest pain, shortness of breath, abdominal or flank pain.  Denies extremity injuries or pain other than the right shoulder.   \"    Patient is alert and oriented x3, able to make needs known. Valium effective for CIWA/withdrawal symptoms. Pain uncontrolled prior to re-location.      Abnormal Results:   Labs Ordered and Resulted from Time of ED Arrival to Time of ED Departure   BASIC METABOLIC PANEL - " Abnormal       Result Value    Sodium 135      Potassium 3.9      Chloride 92 (*)     Carbon Dioxide (CO2) 19 (*)     Anion Gap 24 (*)     Urea Nitrogen 6.0      Creatinine 0.88      GFR Estimate >90      Calcium 10.3      Glucose 192 (*)    HEPATIC FUNCTION PANEL - Abnormal    Protein Total 8.7 (*)     Albumin 4.9      Bilirubin Total 1.2      Alkaline Phosphatase 88       (*)      (*)     Bilirubin Direct 0.44 (*)    LIPASE - Abnormal    Lipase 10 (*)    CBC WITH PLATELETS AND DIFFERENTIAL - Abnormal    WBC Count 7.6      RBC Count 4.62      Hemoglobin 15.6      Hematocrit 44.7      MCV 97      MCH 33.8 (*)     MCHC 34.9      RDW 12.1      Platelet Count 159      % Neutrophils 78      % Lymphocytes 14      % Monocytes 7      % Eosinophils 0      % Basophils 0      % Immature Granulocytes 0      NRBCs per 100 WBC 0      Absolute Neutrophils 5.9      Absolute Lymphocytes 1.1      Absolute Monocytes 0.5      Absolute Eosinophils 0.0      Absolute Basophils 0.0      Absolute Immature Granulocytes 0.0      Absolute NRBCs 0.0     PHOSPHORUS - Abnormal    Phosphorus 2.3 (*)    ETHYL ALCOHOL LEVEL - Normal    Alcohol ethyl <0.01     MAGNESIUM - Normal    Magnesium 2.1          Humerus XR, G/E 2 views, right   Final Result   IMPRESSION:       There is anterior dislocation of the right humeral head relative to the glenoid, with an associated acute, displaced fracture of the posterosuperior humeral head involving the greater tuberosity and extending vertically into the humeral neck. Question a    small acute fracture of the anterior glenoid as well. Postreduction radiographs would be helpful.      XR Shoulder Right 2 Views   Final Result   IMPRESSION:       There is anterior dislocation of the right humeral head relative to the glenoid, with an associated acute, displaced fracture of the posterosuperior humeral head involving the greater tuberosity and extending vertically into the humeral neck. Question a     small acute fracture of the anterior glenoid as well. Postreduction radiographs would be helpful.      CT Cervical Spine w/o Contrast   Final Result   IMPRESSION:   1.  No evidence of acute fracture or subluxation of the cervical spine by CT imaging.      Head CT w/o contrast   Final Result   IMPRESSION:     1.  No evidence of acute intracranial hemorrhage or mass effect.      Shoulder XR, right, 2-3 vw PORTABLE    (Results Pending)       Treatments provided: See MAR, tele, seizure precautions  Family Comments: Mother and S/o at bedside  OBS brochure/video discussed/provided to patient:  No  ED Medications:   Medications   HYDROmorphone (PF) (DILAUDID) injection 0.3 mg (0.3 mg Intravenous $Given 3/19/25 2012)   diazepam (VALIUM) injection 5 mg (5 mg Intravenous $Given 3/19/25 2012)   propofol (DIPRIVAN) injection 10 mg/mL vial (20 mg Intravenous $Given 3/19/25 2045)   lidocaine 1 % 0.1-1 mL (has no administration in time range)   lidocaine (LMX4) cream (has no administration in time range)   sodium chloride (PF) 0.9% PF flush 3 mL (has no administration in time range)   sodium chloride (PF) 0.9% PF flush 3 mL (has no administration in time range)   acetaminophen (TYLENOL) tablet 650 mg (has no administration in time range)     Or   acetaminophen (TYLENOL) Suppository 650 mg (has no administration in time range)   polyethylene glycol (MIRALAX) Packet 17 g (has no administration in time range)   senna-docusate (SENOKOT-S/PERICOLACE) 8.6-50 MG per tablet 1 tablet (has no administration in time range)     Or   senna-docusate (SENOKOT-S/PERICOLACE) 8.6-50 MG per tablet 2 tablet (has no administration in time range)   ondansetron (ZOFRAN ODT) ODT tab 4 mg (has no administration in time range)     Or   ondansetron (ZOFRAN) injection 4 mg (has no administration in time range)   prochlorperazine (COMPAZINE) injection 10 mg (has no administration in time range)     Or   prochlorperazine (COMPAZINE) tablet 10 mg (has no  administration in time range)   calcium carbonate (TUMS) chewable tablet 1,000 mg (has no administration in time range)   benzocaine-menthol (CHLORASEPTIC) 6-10 MG lozenge 1 lozenge (has no administration in time range)   miconazole (MICATIN) 2 % powder (has no administration in time range)   Patient is already receiving mechanical prophylaxis (has no administration in time range)   sodium chloride 0.9 % infusion (has no administration in time range)   melatonin tablet 5 mg (has no administration in time range)   famotidine (PEPCID) tablet 20 mg (has no administration in time range)   OLANZapine zydis (zyPREXA) ODT tab 5-10 mg (has no administration in time range)     Or   haloperidol lactate (HALDOL) injection 2.5-5 mg (has no administration in time range)   flumazenil (ROMAZICON) injection 0.2 mg (has no administration in time range)   melatonin tablet 5 mg (has no administration in time range)   cloNIDine (CATAPRES) tablet 0.1 mg (has no administration in time range)   flumazenil (ROMAZICON) injection 0.2 mg (has no administration in time range)   melatonin tablet 5 mg (has no administration in time range)   diazepam (VALIUM) tablet 10 mg (has no administration in time range)     Or   diazepam (VALIUM) injection 5-10 mg (has no administration in time range)   gabapentin (NEURONTIN) tablet 1,200 mg (has no administration in time range)   gabapentin (NEURONTIN) capsule 900 mg (has no administration in time range)   gabapentin (NEURONTIN) capsule 600 mg (has no administration in time range)   gabapentin (NEURONTIN) capsule 300 mg (has no administration in time range)   gabapentin (NEURONTIN) capsule 100 mg (has no administration in time range)   multivitamin w/minerals (THERA-VIT-M) tablet 1 tablet (has no administration in time range)   thiamine (B-1) tablet 100 mg (has no administration in time range)   folic acid (FOLVITE) tablet 1 mg (has no administration in time range)   lidocaine 1% with EPINEPHrine 1:100,000  injection 5 mL (5 mLs Intradermal $Given 3/19/25 2006)   HYDROmorphone (PF) (DILAUDID) injection 0.5 mg (0.5 mg Intravenous $Given 3/19/25 1814)   diazepam (VALIUM) injection 5 mg (5 mg Intravenous $Given 3/19/25 1823)   lactated ringers BOLUS 1,000 mL (0 mLs Intravenous Stopped 3/19/25 2011)   folic acid injection 1 mg (1 mg Intravenous $Given 3/19/25 1945)   thiamine (B-1) injection 100 mg (100 mg Intravenous $Given 3/19/25 1946)   propofol (DIPRIVAN) injection 10 mg/mL vial (50 mg Intravenous $Given 3/19/25 2032)       Drips infusing:  No  For the majority of the shift this patient was Green.   Interventions performed were NA.    Sepsis treatment initiated: No    Cares/treatment/interventions/medications to be completed following ED care: See Orders, Surgery, CIWA    ED Nurse Name: Nikki Boone RN  8:48 PM    RECEIVING UNIT ED HANDOFF REVIEW    Above ED Nurse Handoff Report was reviewed: Yes  Reviewed by: Donya Rebolledo RN on March 19, 2025 at 9:18 PM   I Sherri called the ED to inform them the note was read: Yes

## 2025-03-20 NOTE — DISCHARGE INSTRUCTIONS
Right shoulder dislocation/proximal humerus fracture       Plan:    Non-operative management   Continue use of immobilizer for pain control. Ok to remove with arm at side for hygiene.   Pain medication and ice prn  NWB right UE, max lift coffee cup   Ok to range elbow when pain controlled in shoulder  Follow-up at [facility] either with Dr Bonner in [place] or shoulder provider in [place] 2 weeks following your injury for follow-up xrays.  Call [phone] for appointment/questions.

## 2025-03-21 LAB
HOLD SPECIMEN: NORMAL
PHOSPHATE SERPL-MCNC: 3.7 MG/DL (ref 2.5–4.5)
POTASSIUM SERPL-SCNC: 3.6 MMOL/L (ref 3.4–5.3)

## 2025-03-21 PROCEDURE — 99232 SBSQ HOSP IP/OBS MODERATE 35: CPT | Performed by: INTERNAL MEDICINE

## 2025-03-21 PROCEDURE — 250N000013 HC RX MED GY IP 250 OP 250 PS 637: Performed by: INTERNAL MEDICINE

## 2025-03-21 PROCEDURE — 250N000011 HC RX IP 250 OP 636: Mod: JW | Performed by: STUDENT IN AN ORGANIZED HEALTH CARE EDUCATION/TRAINING PROGRAM

## 2025-03-21 PROCEDURE — 84132 ASSAY OF SERUM POTASSIUM: CPT | Performed by: INTERNAL MEDICINE

## 2025-03-21 PROCEDURE — 84100 ASSAY OF PHOSPHORUS: CPT | Performed by: INTERNAL MEDICINE

## 2025-03-21 PROCEDURE — 120N000001 HC R&B MED SURG/OB

## 2025-03-21 PROCEDURE — 36415 COLL VENOUS BLD VENIPUNCTURE: CPT | Performed by: INTERNAL MEDICINE

## 2025-03-21 RX ORDER — HYDROMORPHONE HYDROCHLORIDE 1 MG/ML
0.3 INJECTION, SOLUTION INTRAMUSCULAR; INTRAVENOUS; SUBCUTANEOUS ONCE
Status: COMPLETED | OUTPATIENT
Start: 2025-03-21 | End: 2025-03-21

## 2025-03-21 RX ADMIN — DIAZEPAM 10 MG: 10 TABLET ORAL at 16:56

## 2025-03-21 RX ADMIN — CLONIDINE HYDROCHLORIDE 0.1 MG: 0.1 TABLET ORAL at 04:52

## 2025-03-21 RX ADMIN — OXYCODONE HYDROCHLORIDE 5 MG: 5 TABLET ORAL at 18:48

## 2025-03-21 RX ADMIN — ACETAMINOPHEN 650 MG: 325 TABLET, FILM COATED ORAL at 11:33

## 2025-03-21 RX ADMIN — ACETAMINOPHEN 650 MG: 325 TABLET, FILM COATED ORAL at 04:55

## 2025-03-21 RX ADMIN — Medication 1 TABLET: at 08:31

## 2025-03-21 RX ADMIN — ACETAMINOPHEN 650 MG: 325 TABLET, FILM COATED ORAL at 18:48

## 2025-03-21 RX ADMIN — FOLIC ACID 1 MG: 1 TABLET ORAL at 08:31

## 2025-03-21 RX ADMIN — DIAZEPAM 10 MG: 10 TABLET ORAL at 11:34

## 2025-03-21 RX ADMIN — CLONIDINE HYDROCHLORIDE 0.1 MG: 0.1 TABLET ORAL at 21:37

## 2025-03-21 RX ADMIN — OXYCODONE HYDROCHLORIDE 5 MG: 5 TABLET ORAL at 08:35

## 2025-03-21 RX ADMIN — CLONIDINE HYDROCHLORIDE 0.1 MG: 0.1 TABLET ORAL at 12:19

## 2025-03-21 RX ADMIN — DIAZEPAM 10 MG: 10 TABLET ORAL at 22:52

## 2025-03-21 RX ADMIN — GABAPENTIN 900 MG: 300 CAPSULE ORAL at 12:19

## 2025-03-21 RX ADMIN — HYDROMORPHONE HYDROCHLORIDE 0.3 MG: 1 INJECTION, SOLUTION INTRAMUSCULAR; INTRAVENOUS; SUBCUTANEOUS at 06:51

## 2025-03-21 RX ADMIN — ACETAMINOPHEN 650 MG: 325 TABLET, FILM COATED ORAL at 22:52

## 2025-03-21 RX ADMIN — GABAPENTIN 900 MG: 300 CAPSULE ORAL at 04:52

## 2025-03-21 RX ADMIN — THIAMINE HCL TAB 100 MG 100 MG: 100 TAB at 08:31

## 2025-03-21 RX ADMIN — Medication 5 MG: at 21:37

## 2025-03-21 RX ADMIN — GABAPENTIN 900 MG: 300 CAPSULE ORAL at 21:36

## 2025-03-21 RX ADMIN — OXYCODONE HYDROCHLORIDE 5 MG: 5 TABLET ORAL at 22:52

## 2025-03-21 RX ADMIN — AMITRIPTYLINE HYDROCHLORIDE 50 MG: 50 TABLET, FILM COATED ORAL at 21:37

## 2025-03-21 RX ADMIN — FAMOTIDINE 20 MG: 20 TABLET, FILM COATED ORAL at 08:31

## 2025-03-21 RX ADMIN — FAMOTIDINE 20 MG: 20 TABLET, FILM COATED ORAL at 21:36

## 2025-03-21 RX ADMIN — OXYCODONE HYDROCHLORIDE 5 MG: 5 TABLET ORAL at 04:52

## 2025-03-21 RX ADMIN — TRAZODONE HYDROCHLORIDE 200 MG: 100 TABLET ORAL at 21:36

## 2025-03-21 ASSESSMENT — ACTIVITIES OF DAILY LIVING (ADL)
DEPENDENT_IADLS:: INDEPENDENT
ADLS_ACUITY_SCORE: 30
ADLS_ACUITY_SCORE: 36
ADLS_ACUITY_SCORE: 25
ADLS_ACUITY_SCORE: 26
ADLS_ACUITY_SCORE: 36
ADLS_ACUITY_SCORE: 30
ADLS_ACUITY_SCORE: 36
ADLS_ACUITY_SCORE: 30
ADLS_ACUITY_SCORE: 25
ADLS_ACUITY_SCORE: 30
ADLS_ACUITY_SCORE: 30
ADLS_ACUITY_SCORE: 25
ADLS_ACUITY_SCORE: 26
ADLS_ACUITY_SCORE: 26
ADLS_ACUITY_SCORE: 25
ADLS_ACUITY_SCORE: 30

## 2025-03-21 NOTE — PROGRESS NOTES
[facility]  Hospitalist Progress Note  Wil Mckeon MD 03/21/2025    Reason for Stay (Diagnosis): ETOH withdrawal seizure         Assessment and Plan:      Summary of Stay: Tello Jones is a 39 year old male admitted on 3/19/2025. He has a past medical history of ongoing active heavy alcohol abuse, recurrent alcohol withdrawal seizures, alcohol-related pancreatitis, history of splenic laceration in December 2024, fracture of thoracic and lumbar's vertebrae in December 2024 following alcohol withdrawal seizure, acid reflux disease, failure to maintain sobriety and recurrent falls.     Patient was admitted after an alcohol withdrawal seizure, resulting in right shoulder dislocation and fracture.  Orthopedics have been consulted and recommend non-surgical management    Withdrawal sx are resolving     Plans today:  -Continue CIWA protocol for alcohol withdrawl.  Receiving as needed Valium  -Continue thiamine and folate  -Continue gabapentin per withdrawal protocol for seizure ppx  -CD consult     1/.  Alcohol withdrawal c/b seizure   -Continue CIWA protocol  -Continue gabapentin  -Advised complete alcohol cessation     2/.  Shoulder dislocation   - reduced in the emergency room  -Continue immobilizer/sling     3/.  Right proximal humerus and R glenoid shoulder fracture:   -Await orthopedics input  - NWB RUE  - RUE immobilizer/sling     4/. Hypokalemia and hypophosphatemia  -Related to alcohol use  -supplement protocols ordered           DVT Prophylaxis: Pneumatic Compression Devices  Code Status: Full Code  Discharge Dispo: home  Medically Ready for Discharge: Anticipated tomorrow after withdrawal sx resolved             Interval History (Subjective):      Patient feels better today.  Still has some right upper extremity pain as expected.  Orthopedics has recommended nonoperative management.  Still having some withdrawal symptoms but they have improved.                  Physical Exam:      Last  Vital Signs:  BP 99/53 (BP Location: Left arm)   Pulse 76   Temp 98.5  F (36.9  C) (Oral)   Resp 18   Ht 1.829 m (6')   Wt 86.2 kg (190 lb)   SpO2 96%   BMI 25.77 kg/m        Intake/Output Summary (Last 24 hours) at 3/21/2025 1016  Last data filed at 3/20/2025 2000  Gross per 24 hour   Intake 240 ml   Output --   Net 240 ml       Constitutional: Awake, alert, cooperative, no apparent distress.  + tremor     Respiratory: Clear to auscultation bilaterally, no crackles or wheezing   Cardiovascular: Regular rate and rhythm, normal S1 and S2, and no murmur noted   Abdomen: Normal bowel sounds, soft, non-distended, non-tender   Skin: No rashes, no cyanosis, dry to touch   Neuro: Alert and oriented x3, no weakness, numbness, memory loss   Extremities: No edema, normal range of motion   Other(s): R hand warm, fingers mobile, easily palpable R radial pulse       All other systems: Negative          Medications:      All current medications were reviewed with changes reflected in problem list.         Data:      All new lab and imaging data was reviewed.   Labs:       Lab Results   Component Value Date     03/20/2025     03/19/2025     12/06/2024    Lab Results   Component Value Date    CHLORIDE 101 03/20/2025    CHLORIDE 92 03/19/2025    CHLORIDE 102 12/06/2024    CHLORIDE 94 10/21/2021    CHLORIDE 97 10/20/2021    CHLORIDE 96 10/19/2021    Lab Results   Component Value Date    BUN 5.8 03/20/2025    BUN 6.0 03/19/2025    BUN 5.1 12/06/2024    BUN 9 10/21/2021    BUN 13 10/20/2021    BUN 12 10/19/2021      Lab Results   Component Value Date    POTASSIUM 3.6 03/21/2025    POTASSIUM 4.2 03/20/2025    POTASSIUM 3.2 03/20/2025    POTASSIUM 4.0 10/21/2021    POTASSIUM 4.2 10/20/2021    POTASSIUM 3.8 10/19/2021    Lab Results   Component Value Date    CO2 24 03/20/2025    CO2 19 03/19/2025    CO2 23 12/06/2024    CO2 26 10/21/2021    CO2 25 10/20/2021    CO2 25 10/19/2021    Lab Results   Component Value  Date    CR 0.68 03/20/2025    CR 0.88 03/19/2025    CR 0.64 12/06/2024        Recent Labs   Lab 03/20/25  0621   WBC 5.3   HGB 11.3*   HCT 32.9*   MCV 99   PLT 95*      Imaging:   No results found for this or any previous visit (from the past 24 hours).

## 2025-03-21 NOTE — CONSULTS
Care Management Initial Consult    General Information  Assessment completed with: Patient,    Type of CM/SW Visit: Initial Assessment    Primary Care Provider verified and updated as needed: Yes   Readmission within the last 30 days: no previous admission in last 30 days         Advance Care Planning:            Communication Assessment  Patient's communication style: spoken language (English or Bilingual)    Hearing Difficulty or Deaf: no   Wear Glasses or Blind: no    Cognitive  Cognitive/Neuro/Behavioral: WDL  Level of Consciousness: alert  Arousal Level: opens eyes spontaneously  Orientation: oriented x 4  Mood/Behavior: anxious     Speech: clear    Living Environment:   People in home: spouse     Current living Arrangements: house      Able to return to prior arrangements: yes       Family/Social Support:  Care provided by: self, spouse/significant other  Provides care for: no one     Support system: Significant Other          Description of Support System: Supportive, Involved    Support Assessment: Adequate family and caregiver support    Current Resources:   Patient receiving home care services: No        Community Resources: None  Equipment currently used at home: none  Supplies currently used at home:      Employment/Financial:  Employment Status: employed full-time        Financial Concerns:             Does the patient's insurance plan have a 3 day qualifying hospital stay waiver?  Yes     Which insurance plan 3 day waiver is available? Alternative insurance waiver    Will the waiver be used for post-acute placement? Undetermined at this time    Lifestyle & Psychosocial Needs:  Social Drivers of Health     Food Insecurity: Low Risk  (3/19/2025)    Food Insecurity     Within the past 12 months, did you worry that your food would run out before you got money to buy more?: No     Within the past 12 months, did the food you bought just not last and you didn t have money to get more?: No   Depression: Not at  risk (7/19/2024)    [facility]    PHQ-2     PHQ-2 TOTAL SCORE: 1   Housing Stability: Low Risk  (3/19/2025)    Housing Stability     Do you have housing? : Yes     Are you worried about losing your housing?: No   Tobacco Use: Low Risk  (12/30/2024)    Received from [facility]    Patient History     Smoking Tobacco Use: Never     Smokeless Tobacco Use: Never     Passive Exposure: Not on file   Financial Resource Strain: Low Risk  (3/19/2025)    Financial Resource Strain     Within the past 12 months, have you or your family members you live with been unable to get utilities (heat, electricity) when it was really needed?: No   Alcohol Use: Not on file   Transportation Needs: Low Risk  (3/19/2025)    Transportation Needs     Within the past 12 months, has lack of transportation kept you from medical appointments, getting your medicines, non-medical meetings or appointments, work, or from getting things that you need?: No   Physical Activity: Not on file   Interpersonal Safety: High Risk (3/19/2025)    Interpersonal Safety     Do you feel physically and emotionally safe where you currently live?: No     Within the past 12 months, have you been hit, slapped, kicked or otherwise physically hurt by someone?: No     Within the past 12 months, have you been humiliated or emotionally abused in other ways by your partner or ex-partner?: No   Stress: Not on file   Social Connections: Socially Integrated (8/19/2024)    Received from [facility]    Social Connections     Do you often feel lonely or isolated from those around you?: 0   Health Literacy: Not on file       Functional Status:  Prior to admission patient needed assistance:   Dependent ADLs:: Independent  Dependent IADLs:: Independent  Assesssment of Functional Status: Not at baseline with ADL Functioning    Mental Health Status:          Chemical  Dependency Status:                Values/Beliefs:  Spiritual, Cultural Beliefs, Adventism Practices, Values that affect care:                 Discussed  Partnership in Safe Discharge Planning  document with patient/family: No    Additional Information:    CELINE met with pt and significant other Unique at the bedside to introduce SW role. Pt shares that he works at [employer] and is on FMLA. He will not return to work for 2 months and hopes for IP Tx until he returns. SW explained that we can make a CD consult, pt may have to discharge home while on waitlist for CD Tx. He is understanding of this. Pt significant other can assist with transport. They deny any additional questions or needs from SW at this time.     CD consult placed.     Next Steps: follow for CD recs.     Addendum    SW provided materials to the pt/spouse at the bedside provided from CD counselors. Pt/spouse aware to reach out to CD once they know where they want referrals.     Jennifer Marinelli/FAN [place], [facility]  Inpatient Care Coordination  Emergency Room /Float  [phone]    Jennifer Marinelli, [facility]

## 2025-03-21 NOTE — PLAN OF CARE
"Goal Outcome Evaluation:           Outcome Evaluation: CIWA 6, 1130 - 8, PO valium given. Last drink 3/18, 1 handle over a couple of days + beer per girlfriend. Seizure at home, Tongue bite marks R side & lower lip. Tylenol & Oxycodone for R arm \"throbbing\" w/relief. Pt is very unsteady on feet - gaitbelt 1-2 assist to bathroom. Voiding w/o difficulty. Slight edema RUE, Pt states he feels \"tingling\" to R arm at times. Radial pulse +2, no discoloration to R extremity. Safety, mat on floor and alarms on at all times. K/MG WNL this a.m. Immobilizer/sling on RUE at all times.  CIWA Score at 1700 score 8, agitation/anxiety/tremor. Valium 10mg po given. Re-score in 2 hours was 5.       Tylenol & Oxycodone given for R shoulder/arm pain, throbbing this evening at 1842.     Problem: Adult Inpatient Plan of Care  Goal: Plan of Care Review  Description: The Plan of Care Review/Shift note should be completed every shift.  The Outcome Evaluation is a brief statement about your assessment that the patient is improving, declining, or no change.  This information will be displayed automatically on your shiftnote.  Outcome: Progressing  Flowsheets (Taken 3/21/2025 1306)  Outcome Evaluation: CIWA 6, 1130 - 8, PO valium given. Last drink 3/18, 1 handle over a couple of days + beer per girlfriend. Seizure at home, Tongue bite marks R side & lower lip. Tylenol & Oxycodone for R arm \"throbbing\" w/relief. Pt is very unsteady on feet - gaitbelt 1-2 assist to bathroom. Voiding w/o difficulty. Slight edema RUE, Pt states he feels \"tingling\" to R arm at times. Radial pulse +2, no discoloration to R extremity. Safety, mat on floor and alarms on at all times.  Goal: Patient-Specific Goal (Individualized)  Description: You can add care plan individualizations to a care plan. Examples of Individualization might be:  \"Parent requests to be called daily at 9am for status\", \"I have a hard time hearing out of my right ear\", or \"Do not touch me to " "wake me up as it startlesme\".  Outcome: Progressing  Goal: Absence of Hospital-Acquired Illness or Injury  Outcome: Progressing  Intervention: Identify and Manage Fall Risk  Recent Flowsheet Documentation  Taken 3/21/2025 0800 by Babs Dukes RN  Safety Promotion/Fall Prevention:   safety round/check completed   room door open  Intervention: Prevent Skin Injury  Recent Flowsheet Documentation  Taken 3/21/2025 0800 by Babs Dukes RN  Body Position: position changed independently  Intervention: Prevent Infection  Recent Flowsheet Documentation  Taken 3/21/2025 0800 by Babs Dukes RN  Infection Prevention: hand hygiene promoted  Goal: Optimal Comfort and Wellbeing  Outcome: Progressing  Intervention: Monitor Pain and Promote Comfort  Recent Flowsheet Documentation  Taken 3/21/2025 1224 by Babs Dukes RN  Pain Management Interventions: declines  Taken 3/21/2025 1133 by Babs Dukes RN  Pain Management Interventions: medication (see MAR)  Taken 3/21/2025 0832 by Babs Dukes RN  Pain Management Interventions: medication (see MAR)  Goal: Readiness for Transition of Care  Outcome: Progressing               "

## 2025-03-21 NOTE — PROGRESS NOTES
"[facility]  [address] [place] [address], [place] [address]      Assessment and Placement Summary Update     Patient name:   Tello Jones   Patient phone: [phone] (home)    Last #:   [id number]   : 1985      PMI #: This patient does not have a PMI number.   Patient address:   [address] [place] [address]     Date of Original Assessment / Last Update: 2024 Update Assessment Date: 3/21/2025   Updated by:   LOURDES Quintanilla    E-mail: [email or web address]   Referred by:   Self Agency / phone number: N/a   Referral to:   IP DEYSI   NPI: Florian NPI #: [id number]   Summary:  This patient had a IP Substance Use Disorder assessment on 24 at [facility] completed by LOURDES Skelton.      Reason for today's update: Pt is interested in DEYSI treatment at this time because he recently had \"a seizure\" from alcohol withdrawal.       Substance Use History Update:                 Comprehensive Substance Use History    X X = Primary Drug Used Age of First Use    Pattern of Substance Use   (heaviest use in life and a use history within the past year if applicable) (DSM-5: Sx #3) Date /  Quantity of last use if within the past 30 days Withdrawal Potential?    Method of use  (Oral, smoked, snorted, IV, etc)   x Alcohol   15 Per 3/21/25 Update:  Past month: he is drinking 6 days per week. When he goes to the liquor store he buys 3 things- beer, vodka, gin. This lasts him 3-4 days.    Per 24 DEYSI CA:  Pt reports he broke his ribs this past summer \"and didn't have much to do\"  Pt reports he was drinking on daily basis, drinking \"a lot, too much\" for 3-4 months  Pt reports \"just after Thanksgiving\" he started to cut back his use and was drinking 1-2 drinks a day.  Last use: 12/3/24   3/18/25  \"Too much\" No Oral     Marijuana/Hashish   15 Per 3/21/25 Update:  No recent use.    Per 24 DEYSI CA:  Pt reports trying cannabis when he was younger, " "denies recent use   15 years ago No Smoked     Cocaine/Crack No use             Meth/Amphetamines   No use             Heroin   No use             Other Opiates/Synthetics   Unsp Per 3/21/25 Update:  Currently prescribed oxycodone while in the hospital.    Per 24 DEYSI CA:  Pt denies recreational use, only as administered in the hospital by staff for pain management.  Last use: 12/6/24 3/21/2025   No Oral     Inhalants  No use             Benzodiazepines   No use             Hallucinogens   No use             Barbiturates/Sedatives/Hypnotics   No use             Over-the-Counter Drugs   No use             Other   No use             Nicotine   No use                Dimension: Severity Rating/ Reason for Changes from Previous Assessment:  Dimension I: Acute intoxication/Withdrawal potential     Previous ratin Current ratin   Comments:   Pt reports his current withdrawal symptoms are \"shakes, depression.\"     Dimension II: Biomedical Conditions and Complications     Previous ratin Current ratin   Comments:   He reports no health concerns and he is independent with all ADLs.    Patient Active Problem List   Diagnosis    Alcohol withdrawal, uncomplicated (H)    Alcohol-induced acute pancreatitis with uninfected necrosis    Laceration of spleen, initial encounter    Alcohol withdrawal seizure with complication (H)    Closed fracture of transverse process of lumbar vertebra, initial encounter (H)    Seizure (H)    ETOH abuse    Traumatic closed displaced fracture of right shoulder with anterior dislocation     Current Facility-Administered Medications   Medication Dose Route Frequency Provider Last Rate Last Admin    acetaminophen (TYLENOL) tablet 650 mg  650 mg Oral Q4H PRN Gabriela Muniz MD   650 mg at 25 0455    Or    acetaminophen (TYLENOL) Suppository 650 mg  650 mg Rectal Q4H PRN Gabriela Muniz MD        amitriptyline (ELAVIL) tablet 50 mg  50 mg Oral At Bedtime Gabriela Muniz MD   " 50 mg at 03/20/25 2345    benzocaine-menthol (CHLORASEPTIC) 6-10 MG lozenge 1 lozenge  1 lozenge Buccal Q1H PRN Gabriela Muniz MD        calcium carbonate (TUMS) chewable tablet 1,000 mg  1,000 mg Oral 4x Daily PRN Gabriela Muniz MD        cloNIDine (CATAPRES) tablet 0.1 mg  0.1 mg Oral Q8H Gabriela Muniz MD   0.1 mg at 03/21/25 0452    diazepam (VALIUM) tablet 10 mg  10 mg Oral Q30 Min PRN Gabriela uMniz MD   10 mg at 03/20/25 1957    Or    diazepam (VALIUM) injection 5-10 mg  5-10 mg Intravenous Q30 Min PRN Gabriela Muniz MD        famotidine (PEPCID) tablet 20 mg  20 mg Oral BID Gabriela Muniz MD   20 mg at 03/21/25 0831    flumazenil (ROMAZICON) injection 0.2 mg  0.2 mg Intravenous q1 min prn Gabriela Muniz MD        folic acid (FOLVITE) tablet 1 mg  1 mg Oral Daily Gabriela Muniz MD   1 mg at 03/21/25 0831    [START ON 3/27/2025] gabapentin (NEURONTIN) capsule 100 mg  100 mg Oral Q8H Gabriela Muniz MD        [START ON 3/25/2025] gabapentin (NEURONTIN) capsule 300 mg  300 mg Oral Q8H Gabriela Muniz MD        [START ON 3/23/2025] gabapentin (NEURONTIN) capsule 600 mg  600 mg Oral Q8H Gabriela Muniz MD        gabapentin (NEURONTIN) capsule 900 mg  900 mg Oral Q8H Gabriela Muniz MD   900 mg at 03/21/25 0452    OLANZapine zydis (zyPREXA) ODT tab 5-10 mg  5-10 mg Oral Q6H PRN Gabriela Muniz MD        Or    haloperidol lactate (HALDOL) injection 2.5-5 mg  2.5-5 mg Intravenous Q6H PRN Gabriela Muniz MD        lidocaine (LMX4) cream   Topical Q1H PRN Gabriela Muniz MD        lidocaine 1 % 0.1-1 mL  0.1-1 mL Other Q1H PRN Gabriela Muniz MD        melatonin tablet 5 mg  5 mg Oral At Bedtime Gabriela Muniz MD   5 mg at 03/20/25 2345    miconazole (MICATIN) 2 % powder   Topical BID PRN Gabriela Muniz MD        multivitamin w/minerals (THERA-VIT-M) tablet 1 tablet  1 tablet Oral Daily Gabriela Muniz MD   1 tablet at 03/21/25 0831    naloxone (NARCAN) injection 0.2 mg  0.2 mg Intravenous Q2 Min PRN Cristy  MD Gabriela        Or    naloxone (NARCAN) injection 0.4 mg  0.4 mg Intravenous Q2 Min PRN Gabriela Muniz MD        Or    naloxone (NARCAN) injection 0.2 mg  0.2 mg Intramuscular Q2 Min PRN Gabriela Muniz MD        Or    naloxone (NARCAN) injection 0.4 mg  0.4 mg Intramuscular Q2 Min PRN Gabriela Muniz MD        ondansetron (ZOFRAN ODT) ODT tab 4 mg  4 mg Oral Q6H PRN Gabriela Muniz MD        Or    ondansetron (ZOFRAN) injection 4 mg  4 mg Intravenous Q6H PRN Gabriela Muniz MD        oxyCODONE (ROXICODONE) tablet 5 mg  5 mg Oral Q4H PRN Indiana Pierre MD   5 mg at 03/21/25 0835    oxyCODONE IR (ROXICODONE) half-tab 2.5 mg  2.5 mg Oral Q4H PRN Indiana Pierre MD   2.5 mg at 03/20/25 0034    Patient is already receiving mechanical prophylaxis   Does not apply Continuous PRN Gabriela Muniz MD        polyethylene glycol (MIRALAX) Packet 17 g  17 g Oral Daily Gabriela Muniz MD        prochlorperazine (COMPAZINE) injection 10 mg  10 mg Intravenous Q6H PRN Gabriela Muniz MD        Or    prochlorperazine (COMPAZINE) tablet 10 mg  10 mg Oral Q6H PRN Gabriela Muniz MD        propofol (DIPRIVAN) injection 10 mg/mL vial  30 mg Intravenous Q2 Min PRN Gabriela Muniz MD   20 mg at 03/19/25 2048    senna-docusate (SENOKOT-S/PERICOLACE) 8.6-50 MG per tablet 1 tablet  1 tablet Oral BID PRN Gabriela Muniz MD        Or    senna-docusate (SENOKOT-S/PERICOLACE) 8.6-50 MG per tablet 2 tablet  2 tablet Oral BID PRN Gabriela Muniz MD        sodium chloride (PF) 0.9% PF flush 3 mL  3 mL Intracatheter Q8H Gabriela Muniz MD   3 mL at 03/20/25 1959    sodium chloride (PF) 0.9% PF flush 3 mL  3 mL Intracatheter q1 min prn Gabriela uMniz MD   3 mL at 03/20/25 2346    thiamine (B-1) tablet 100 mg  100 mg Oral Daily Gabriela Muniz MD   100 mg at 03/21/25 0831    traZODone (DESYREL) tablet 200 mg  200 mg Oral At Bedtime Gabriela Muniz MD   200 mg at 03/20/25 2348      Dimension III: Emotional/Behavioral/Cognitive     Previous rating:    "2 Current ratin   Comments:   Pt reports no formal mental health dx.     Dimension IV: Readiness for Change     Previous ratin Current ratin   Comments:   He is ready to change at this time because \"I rode to the hospital in an ambulance more than once.\"     Dimension V: Relapse/Continued Use/Continued problem potential     Previous rating:   3 Current ratin   Comments:   Pt has no history of formal DEYSI treatment.  His biggest reasons for drinking are, \"I don't know. That is a tough one.\" He has not attended any sober support groups. He lacks insight into his alcohol use disorder.     Dimension VI: Recovery environment    Previous ratin Current ratin   Comments:   He is currently on a FMLA leave for an injury. Pt reports no current legal issues.       Summary of Assessment Update and Recommendations:   What was the outcome of last referral?   He did not attend [facility] DEYSI treatment.     Reason for changes in the Risk Description since last assessment?   DIM 5: Risk Level changed from a 3 to a 4 due to pt lacking insight into his relapse triggers and warning signs.     Recommendation and rationale for current request and significant issues that need to be addressed:    Recommendations:  1)  Complete a residential based or similar treatment program.  2)  Abstain from all mood-altering chemicals unless prescribed by a licensed provider.   3)  Attend, at minimum, 2 weekly support group meetings, such as 12 step based (AA/NA), SMART Recovery, Health Realizations, and/or Refuge Recovery meetings.     4)  Actively work with a male mentor/sponsor on a weekly basis.   5)  Follow all the recommendations of your treatment/medical providers.    Clinical Substantiation:    Pt was hospitalized for alcohol withdrawal in 2024 and was referred to [facility] DEYSI treatment. Pt did not attend DEYSI treatment at that time. This time pt has returned to the hospital for an alcohol withdrawal related " seizure. He lacks insight into his relapse triggers and warning signs. He is at a high risk to return to drinking upon discharge.    Referrals/ Alternatives:  [facility]:  [address] [place] [address]  Phone: [phone]  Fax: [phone]  email referrals: [email or web address]  [email or web address]    [facility]:  [place] [address] [place] [address], [facility], [phone]  Phone: [phone]   Fax: [phone]  Email: [email or web address]  [email or web address]    [facility]: (co-ed, 30-45 days long)  Ph: [phone]  Fax: [phone]  [address] [place] [address]  Email: [email or web address]  [email or web address]    [facility]  [address] [place] [address]  Phone: [phone]  Fax: [phone]  Email: [email or web address]  [email or web address]    [facility]:  Access Team for Referrals  Phone: [phone]  Fax: [phone]  Email: [email or web address]  Email: [email or web address]   Email: [email or web address]  [email or web address]    [facility]  Phone: [phone]  Fax: [phone]  email: [email or web address]  [email or web address]    SHAUN Assessment ID: [id number]       DEYSI consult completed by:   Kelly Freeman MA, [facility]  Substance Use Disorder Evaluation Counselor  E-mail Address: [email or web address]  [facility] Consult & Liaison Department  [facility], [address]  [place] [address]     *Due to regulation of Title 42 of the Code of Federal Regulations (CFR) Part 2: Confidentiality laws apply to this note and the information wherein.  Thus, this note cannot be copy and pasted  into any other health care staff's note nor can it be included in general medical records sent to ANY outside agency without the patient's written consent.                            Type Of Assessment: Inpatient Substance Use Comprehensive Assessment     Referral Source:  [facility]  Unit Phone: [phone]  MRN:   [id number]     DATE OF SERVICE:  2024  Date of previous DEYSI Assessment: N/A  Patient confirmed identity through two factor verification: Full Legal Name and      PATIENT'S NAME:    Tello Jones  PREFERRED NAME: Tello  PRONOUNS: he/him  Age: 39 year old  Last 4 SSN: [id number]  Sex: male   Gender Identity: male  Sexual Orientation: Heterosexual  Cultural Background: No, Denies any cultural influences or concerns that need to be considered for treatment  YOB: 1985  Current Address:   [address] [place] [address]  Patient Phone Number:  [phone]   Patient's E-Mail Contact:  [email or web address]  Funding: "[employer] Open Access  PMI: N/A  Emergency Contact: Carol Ann Ross (Significant other) P: [phone]  DAANES information was provided to patient and patient does not want a copy.      Telemedicine Visit: The patient's condition can be safely assessed and treated via synchronous audio and visual telemedicine encounter.    Reason for Telemedicine Visit: Services only offered telehealth  Originating Site (Patient Location): [address] [place] [address]   Distant Site (Provider Location): Provider Remote Setting- Home Office  Consent:  The patient/guardian has verbally consented to: the potential risks and benefits of telemedicine (video visit) versus in person care; bill my insurance or make self-payment for services provided; and responsibility for payment of non-covered services.   Mode of Communication:  Video Conference via  [email or web address]     START TIME: 1500  END TIME: 1540     As the  provider I attest to compliance with applicable laws and regulations related to telemedicine.   Tello Jones was seen for a substance use disorder consult [facility] 12[facility]2024 [facility]     Reason for Substance Use Disorder Consult:  Per H&P 12/4/24:  Tello Jones is a 39 year old  male who presented to the ED after seizure at work. Reports no memory of event. He was at work and his coworkers called ambulance. Generalized seizure activity noted for multiple minutes.  Reports heavy drinking but cut back to a couple drinks per day in the last week, last drink was last evening  Complains of left flank pain.  Able to move around ok  Denies shortness of breath, chest pain, abdominal pain, nausea, emesis, dizziness, visual changes, headache, neck pain, extremity pain.        Are you currently having severe withdrawal symptoms that are putting yourself or others in danger? No  Are you currently having severe medical problems that require immediate attention? No  Are you currently having severe emotional or behavioral problems that are putting yourself or others at risk of harm? No     Have you participated in prior substance use disorder evaluations? No   Have you ever been to detox, inpatient or outpatient treatment for substance related use? List previous treatment: No   Have you ever had a gambling problem or had treatment for compulsive gambling? No  Have you ever felt the need to bet more and more money? No  Have you ever had to lie to people important to you about how much you gambled? No     Patient does not appear to be in severe withdrawal, an imminent safety risk to self or others, or requiring immediate medical attention and may proceed with the assessment interview.               Comprehensive Substance Use History    X X = Primary Drug Used Age of First Use    Pattern of Substance Use   (heaviest use in life and a use history within the past year if applicable) (DSM-5: Sx #3) Date  "/  Quantity of last use if within the past 30 days Withdrawal Potential?    Method of use  (Oral, smoked, snorted, IV, etc)   x Alcohol   15 Pt reports he broke his ribs this past summer \"and didn't have much to do\"  Pt reports he was drinking on daily basis, drinking \"a lot, too much\" for 3-4 months  Pt reports \"just after Thanksgiving\" he started to cut back his use and was drinking 1-2 drinks a day 12/3/24 No Oral     Marijuana/Hashish   15 Pt reports trying cannabis when he was younger, denies recent use 15 years ago No Smoked     Cocaine/Crack No use             Meth/Amphetamines   No use             Heroin   No use             Other Opiates/Synthetics   Unsp Pt denies recreational use, only as administered in the hospital by staff for pain management 12/6/24 No Oral     Inhalants  No use             Benzodiazepines   No use             Hallucinogens   No use             Barbiturates/Sedatives/Hypnotics   No use             Over-the-Counter Drugs   No use             Other   No use             Nicotine   No use              Withdrawal symptoms: Have you had any of the following withdrawal symptoms?  Shaky / Jittery / Tremors  Seizures     Have you experienced any cravings?  Yes     Have you had periods of abstinence?  Yes   What was your longest period? \"A week or less\"     Any circumstances that lead to relapse? Stress at work     What activities have you engaged in when using alcohol/other drugs that could be hazardous to you or others?  The patient denied engaging in any of the above dangerous activities when using alcohol and/or drugs.     A description of any risk-taking behavior, including behavior that puts the client at risk of exposure to blood-borne or sexually transmitted diseases: Pt denies     Arrests and legal interventions related to substance use: Pt reports \"I got some minors\" when he was young for underage consumption but denies any other legals issues    A description of how the patient's use " affected their ability to function appropriately in a work setting: The patient reported his substance use has negatively impacted his ability to function in a work setting.  The patient reported having decreased performance at work due to his substance use.       A description of how the patient's use affected their ability to function appropriately in an educational setting: The patient reported his substance use has not negatively impacted his ability to function in a school setting.        Leisure time activities that are associated with substance use: Pt denies     Do you think your substance use has become a problem for you? He agrees he has a substance abuse problem.     MEDICAL HISTORY  Physical or medical concerns or diagnoses:   Problem list       Patient Active Problem List   Diagnosis    Alcohol withdrawal, uncomplicated (H)    Alcohol-induced acute pancreatitis with uninfected necrosis    Laceration of spleen, initial encounter    Alcohol withdrawal seizure with complication (H)    Closed fracture of transverse process of lumbar vertebra, initial encounter (H)         Do you have any current medical treatment needs not being addressed by inpatient treatment?  Pt denies     Do you need a referral for a medical provider? Pt reports he goes to [facility] in [place] and sees Dr Marrero     Current medications: Patient reports current meds as:   Outpatient Medications Marked as Taking for current encounter             Current Facility-Administered Medications   Medication Dose Route Frequency Provider Last Rate Last Admin    acetaminophen (TYLENOL) tablet 975 mg  975 mg Oral Q8H Davon Gordon MD   975 mg at 12/06/24 1055    amitriptyline (ELAVIL) tablet 50 mg  50 mg Oral At Bedtime Linda Smiley MD   50 mg at 12/05/24 2300    calcium carbonate (TUMS) chewable tablet 1,000 mg  1,000 mg Oral 4x Daily PRN Linda Smiley MD        cloNIDine (CATAPRES) tablet 0.1 mg  0.1 mg Oral Q8H Juan J Altamirano  LUANA, DO   0.1 mg at 12/06/24 0942    flumazenil (ROMAZICON) injection 0.2 mg  0.2 mg Intravenous q1 min prn Juan J Altamirano DO        folic acid (FOLVITE) tablet 1 mg  1 mg Oral Daily Robertleruss, Juan J CRAFT, DO   1 mg at 12/06/24 0941    [START ON 12/12/2024] gabapentin (NEURONTIN) capsule 100 mg  100 mg Oral Q8H RobertleJuan J solano, DO        [START ON 12/10/2024] gabapentin (NEURONTIN) capsule 300 mg  300 mg Oral Q8H Rickleruss, Juan J CRAFT, DO        [START ON 12/8/2024] gabapentin (NEURONTIN) capsule 600 mg  600 mg Oral Q8H Juan J Altamirano, DO        gabapentin (NEURONTIN) capsule 900 mg  900 mg Oral Q8H Robertleruss, Juan J CRAFT, DO   900 mg at 12/06/24 0941    OLANZapine zydis (zyPREXA) ODT tab 5-10 mg  5-10 mg Oral Q6H PRN Juan J Altamirano DO         Or    haloperidol lactate (HALDOL) injection 2.5-5 mg  2.5-5 mg Intravenous Q6H PRN Juan J Altamirano DO        lidocaine (LMX4) cream   Topical Q1H PRN Linda Smiley MD        lidocaine 1 % 0.1-1 mL  0.1-1 mL Other Q1H PRN Linda Smiley MD        LORazepam (ATIVAN) tablet 1-2 mg  1-2 mg Oral Q30 Min PRN Juan J Altamirano DO         Or    LORazepam (ATIVAN) injection 1-2 mg  1-2 mg Intravenous Q30 Min PRN Juan J Altamirano,         melatonin tablet 5 mg  5 mg Oral QPM PRN Juan J Altamirano,         methocarbamol (ROBAXIN) tablet 1,000 mg  1,000 mg Oral TID Davon Gordon MD   1,000 mg at 12/06/24 1157    multivitamin w/minerals (THERA-VIT-M) tablet 1 tablet  1 tablet Oral Daily Juan J Altamirano, DO   1 tablet at 12/06/24 0941    naloxone (NARCAN) injection 0.2 mg  0.2 mg Intravenous Q2 Min PRN Linda Smiley MD         Or    naloxone (NARCAN) injection 0.4 mg  0.4 mg Intravenous Q2 Min PRN Linda Smiley MD         Or    naloxone (NARCAN) injection 0.2 mg  0.2 mg Intramuscular Q2 Min PRN Linda Smiley MD         Or    naloxone (NARCAN) injection 0.4 mg  0.4 mg Intramuscular Q2 Min PRN Linda Smiley MD        ondansetron  (ZOFRAN ODT) ODT tab 4 mg  4 mg Oral Q6H PRN Linda Smiley MD         Or    ondansetron (ZOFRAN) injection 4 mg  4 mg Intravenous Q6H PRN Linda Smiley MD        oxyCODONE (ROXICODONE) tablet 5 mg  5 mg Oral Q4H PRN Jamil Goode MD        oxyCODONE IR (ROXICODONE) tablet 10 mg  10 mg Oral Q4H PRN Jamil Goode MD   10 mg at 12/06/24 1054    pantoprazole (PROTONIX) EC tablet 40 mg  40 mg Oral Daily Linda Smiley MD   40 mg at 12/06/24 0941    polyethylene glycol (MIRALAX) Packet 17 g  17 g Oral BID PRN Linda Smiley MD   17 g at 12/05/24 2017    senna-docusate (SENOKOT-S/PERICOLACE) 8.6-50 MG per tablet 1 tablet  1 tablet Oral BID PRLinda Traore MD   1 tablet at 12/04/24 2345     Or    senna-docusate (SENOKOT-S/PERICOLACE) 8.6-50 MG per tablet 2 tablet  2 tablet Oral BID PRN Linda Smiley MD   2 tablet at 12/05/24 1730    sertraline (ZOLOFT) tablet 150 mg  150 mg Oral Daily Linda Smiley MD   150 mg at 12/06/24 0941    sodium chloride (PF) 0.9% PF flush 3 mL  3 mL Intracatheter Q8H Linda Smiley MD   3 mL at 12/06/24 0658    sodium chloride (PF) 0.9% PF flush 3 mL  3 mL Intracatheter q1 min prn Linda Smiley MD   3 mL at 12/05/24 1704    thiamine (B-1) tablet 100 mg  100 mg Oral Daily Juan J Altamirano DO   100 mg at 12/06/24 0941    traZODone (DESYREL) tablet 200 mg  200 mg Oral At Bedtime Linda Smiley MD   200 mg at 12/05/24 2300         Are you pregnant? NA, Male     Do you have any specific physical needs/accommodations? No     MENTAL HEALTH HISTORY:  Have you ever had  hospitalizations or treatment for mental health illness: Yes. When, Where, and What circumstances: Pt reports current meds are Rx'd by his PCP Dr Navarrete at [facility]     Mental health history, including diagnosis and symptoms, and the effect on the client's ability to function: Pt reports Sx of anxiety     Current mental health treatment including psychotropic medication needed to  "maintain stability: (Note: The assessment must utilize screening tools approved by the commissioner pursuant to section 245.4863 to identify whether the client screens positive for co-occurring disorders): Sertraline, trazodone     GAIN-SS Tool:       12/6/2024     3:00 PM   When was the last time that you had significant problems...   with feeling very trapped, lonely, sad, blue, depressed or hopeless about the future? Past month   with sleep trouble, such as bad dreams, sleeping restlessly, or falling asleep during the day? Past Month   with feeling very anxious, nervous, tense, scared, panicked or like something bad was going to happen? Past month   with becoming very distressed & upset when something reminded you of the past? 2 to 12 months ago   with thinking about ending your life or committing suicide? Never           12/6/2024     3:00 PM   When was the last time that you did the following things 2 or more times?   Lied or conned to get things you wanted or to avoid having to do something? Past month   Had a hard time paying attention at school, work or home? Past month   Had a hard time listening to instructions at school, work or home? Past month   Were a bully or threatened other people? 1+ years ago   Started physical fights with other people? Never      Have you ever been verbally, emotionally, physically or sexually abused?   The patient denied having any history of being verbally, emotionally, physically or sexually abused.     Family history of substance use and misuse: Pt reports his father and states \"I don't really see him anymore\"     The patient's desire for family involvement in the treatment program: TBD  Level of family support: Pt reports his mother and stepfather and sister are supportive     Social network in relation to expected support for recovery: Pt denies     Are you currently in a significant relationship? Yes.  4B. How long? 3 years            Please describe your significant " other's use of mood altering chemicals? Pt denies     Do you have any children (include living arrangements/custody/contact)?:  Pt denies     What is your current living situation? 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 Pt lives in [place] with his girlfriend and their dog Carloz     SUMMARY:  Ability to understand written treatment materials: Yes  Ability to understand patient rules and patient rights: Yes  Does the patient recognize needs related to substance use and is willing to follow treatment recommendations: Yes  Does the patient have an opioid use disorder:  does not have a history of opiate use.     ASAM Dimension Scale Ratings:  Dimension 1: 1 Client can tolerate and cope with withdrawal discomfort. The client displays mild to moderate intoxication or signs and symptoms interfering with daily functioning but does not immediately endanger self or others. Client poses minimal risk of severe withdrawal.  Dimension 2: 1 Client tolerates and yulia with physical discomfort and is able to get the services that the client needs.  Dimension 3: 2 Client has difficulty with impulse control and lacks coping skills. Client has thoughts of suicide or harm to others without means; however, the thoughts may interfere with participation in some treatment activities. Client has difficulty functioning in significant life areas. Client has moderate symptoms of emotional, behavioral, or cognitive problems. Client is able to participate in most treatment activities.  Dimension 4: 2 Client displays verbal compliance, but lacks consistent behaviors; has low motivation for change; and is passively involved in treatment.  Dimension 5: 3 Client has poor recognition and understanding of relapse and recidivism issues and displays moderately high vulnerability for further substance use or mental health problems. Client has few coping skills and rarely applies coping  skills.  Dimension 6: 2 Client is engaged in structured, meaningful activity, but peers, family, significant other, and living environment are unsupportive, or there is criminal justice involvement by the client or among the client's peers, significant others, or in the client's living environment.     Category of Substance Severity (ICD-10 Code / DSM 5 Code)      Alcohol Use Disorder Severe  (10.20) (303.90)   Cannabis Use Disorder The patient does not meet the criteria for a Cannabis use disorder.   Hallucinogen Use Disorder The patient does not meet the criteria for a Hallucinogen use disorder.   Inhalant Use Disorder The patient does not meet the criteria for an Inhalant use disorder.   Opioid Use Disorder The patient does not meet the criteria for an Opioid use disorder.   Sedative, Hypnotic, or Anxiolytic Use Disorder The patient does not meet the criteria for a Sedative/Hypnotic use disorder.   Stimulant Related Disorder The patient does not meet the criteria for a Stimulant use disorder.   Tobacco Use Disorder The patient does not meet the criteria for a Tobacco use disorder.   Other (or unknown) Substance Use Disorder The patient does not meet the criteria for a Other (or unknown) Substance use disorder.      A problematic pattern of alcohol/drug use leading to clinically significant impairment or distress, as manifested by at least two of the following, occurring within a 12-month period:     1.) Alcohol/drug is often taken in larger amounts or over a longer period than was intended.  3.) A great deal of time is spent in activities necessary to obtain alcohol, use alcohol, or recover from its effects.  4.) Craving, or a strong desire or urge to use alcohol/drug  9.) Alcohol/drug use is continued despite knowledge of having a persistent or recurrent physical or psychological problem that is likely to have been caused or exacerbated by alcohol.  10.) Tolerance, as defined by either of the following: A need  for markedly increased amounts of alcohol/drug to achieve intoxication or desired effect.  11.) Withdrawal, as manifested by either of the following: The characteristic withdrawal syndrome for alcohol/drug (refer to Criteria A and B of the criteria set for alcohol/drug withdrawal).     Specify if: In early remission:  After full criteria for alcohol/drug use disorder were previously met, none of the criteria for alcohol/drug use disorder have been met for at least 3 months but for less than 12 months (with the exception that Criterion A4,  Craving or a strong desire or urge to use alcohol/drug  may be met).      In sustained remission:   After full criteria for alcohol use disorder were previously met, non of the criteria for alcohol/drug use disorder have been met at any time during a period of 12 months or longer (with the exception that Criterion A4,  Craving or strong desire or urge to use alcohol/drug  may be met).      Specify if:   This additional specifier is used if the individual is in an environment where access to alcohol is restricted.     Mild: Presence of 2-3 symptoms  Moderate: Presence of 4-5 symptoms  Severe: Presence of 6 or more symptoms     Collateral information: DEYSI Collateral Info: Sufficient information is obtained from the patient to support diagnosis and recommendations. Contact with a collateral sources is not required.     Recommendations:  1)  Complete an Intensive Outpatient CD Treatment Program  2)  Abstain from all mood-altering chemicals unless prescribed by a licensed provider.   3)  Attend, at minimum, 2 weekly support group meetings, such as 12 step based (AA/NA), SMART Recovery, Health Realizations, and/or Refuge Recovery meetings.     4)  Actively work with a mentor/sponsor on a weekly basis.   5)  Follow all the recommendations of your treatment/medical providers.  6)  Patient may benefit from obtaining a full mental health evaluation.     Clinical Substantiation:  Patient  would benefit from continuing to develop long-term sober maintenance skills, would benefit from continuing to develop sober coping skills, would benefit from continuing to develop a sober peer support network, has mental health symptoms which are exacerbated by substance abuse, and lacks awareness regarding the disease model of addiction.     Referrals/ Alternatives:  [facility]  Contact: Anna Lira [facility]-    P: [phone]   F: [phone]  E: [email or web address]     [facility] [address] [place] [address]  P: [phone]  F: [phone]  E: [email or web address]     [address] [address]  P: [phone]  F:  [phone]     [facility]  [address] , [facility] [address]  [place] [address]  P: [phone]  F: [phone]     DAANES Assessment ID: [id number]      DEYSI consult completed by:   LB Skelton, [facility]  Substance Use Disorder Evaluation Counselor  E-mail Address: [email or web address] Mental Health and Addiction Services Consult [place] [address]     *Due to regulation of Title 42 of the Code of Federal Regulations (CFR) Part 2: Confidentiality laws apply to this note and the information wherein.  Thus, this note cannot be copy and pasted into any other health care staff's note nor can it be included in general medical records sent to ANY outside agency without the patient's written consent.

## 2025-03-21 NOTE — CONSULTS
3/21/2025  Pt completed his DEYSI CA today. He isn't sure if he wants IP or IOP DEYSI treatment. He asked questions about both programs and I was able to explain to him the differences. He wanted a list of IOP and IOP DEYSI treatment program options. I emailed him and the unit SW the below information today. I told him to call or email me once he has chosen a program or let the unit SW know.    Next Steps:  1) pt picks a tx program.  2) pt informs me or the unit sw which tx program he wants to go to.  3) pt signs ROSA for tx program.  4) either myself or unit SW sends DEYSI CA to tx program.    Recommendations:  1)  Complete a residential based or similar treatment program.  2)  Abstain from all mood-altering chemicals unless prescribed by a licensed provider.   3)  Attend, at minimum, 2 weekly support group meetings, such as 12 step based (AA/NA), SMART Recovery, Health Realizations, and/or Refuge Recovery meetings.     4)  Actively work with a male mentor/sponsor on a weekly basis.   5)  Follow all the recommendations of your treatment/medical providers.     Clinical Substantiation:    Pt was hospitalized for alcohol withdrawal in December 2024 and was referred to [facility] DEYSI treatment. Pt did not attend DEYSI treatment at that time. This time pt has returned to the hospital for an alcohol withdrawal related seizure. He lacks insight into his relapse triggers and warning signs. He is at a high risk to return to drinking upon discharge.    [facility] DEYSI treatment programs:  [facility]: (in-person & virtual)  Main phone: [phone]  Direct Dial: [phone]   Admissions email: [email or web address]   DEYSI fax: [phone]  [email or web address]  *Groups are held on Mondays, Tuesdays, Wednesdays, or Thursdays, depending on the location and level of care.  *Groups are generally held from 8am - 12pm and 4pm - 7pm.    [facility]: (in-person)  Coordinator: Anna Lira  Phone:  [phone]  email: [email or web address]  [email or web address].org/specialties/Substance-Use   [place] Co-occurring IOP Morning:  M,T,W 9am-12:00pm   [place] Co-occurring IOP Evening: M,T,W 5:00pm-8:00pm (accepts Medicare)   [place] [facility]: M,T,W 9a-12p    [place]  Phone: [phone]  Fax: [phone]  email: [email or web address]  https://www.[facility]/    [facility] [place] (in-person & virtual)  [address] [place] [address]  Phone: [phone]  Fax: [phone]  Email: [email or web address]  https://M[employer]/[facility]/  *Day program meets Monday-Friday from 8:45am-12:30pm  *Evening program meets Monday-Thursday from 5:30pm-8:30pm  Virtual Group:   *Monday-Friday: 8:30am-12:30pm  *Monday-Friday 6pm-9pm    IP DEYSI treatment programs:  [place]:  [address] [place] [address]  Phone: [phone]  Fax: [phone]  email referrals: [email or web address].  https://www.[email or web address].[employer]/    [facility]:  [address] [address], [place], [phone]  Phone: [phone]   Fax: [phone]  Email: [email or web address]  https://www.[facility].org/    [facility]: (co-ed, 30-45 days long)  Ph: [phone]  Fax: [phone]  [address] [place] [address]  Email: [email or web address]  www.[email or web address]    [facility]  [address] [place] [address]  Phone: [phone]  Fax: [phone]  Email: admissions@[email or web address]  https://[employer]/[facility]/    [facility]:  Access Team for Referrals  Phone: [phone]  Fax: [phone]  Email: [email or web address]  Email: [email or web address]   Email: [email or web address]  https://[employer]/residential-programs/    [place]  Phone:  [phone]  Fax: [phone]  email: Michelle@[facility]  https://www.[facility]/    SHAUN Assessment ID: [id number]       DEYSI consult completed by:   Kelly Freeman MA, [facility]  Substance Use Disorder Evaluation Counselor  E-mail Address: [email or web address] Health [facility] Addiction [facility] Consult & Liaison Department  [facility]  [place] [address]     *Due to regulation of Title 42 of the Code of Federal Regulations (CFR) Part 2: Confidentiality laws apply to this note and the information wherein.  Thus, this note cannot be copy and pasted into any other health care staff's note nor can it be included in general medical records sent to ANY outside agency without the patient's written consent.

## 2025-03-21 NOTE — PLAN OF CARE
"[id number]  VSS on RA. A&Ox4. Tele, SR. CIWA positive x1, PO valium given with improvement. C/o pain in the right shoulder, prn pain med given per MAR. Immobilizer in place, allow pt to put arm on the sides at times per order. SBA. Denies cp, sob, n/v. Seizure precaution pads in place.       Goal Outcome Evaluation:      Plan of Care Reviewed With: patient    Overall Patient Progress: no change    Outcome Evaluation: CIWA positive x1, valium PO given. PRN pain med for pain in the right shoulder per MAR.      Problem: Adult Inpatient Plan of Care  Goal: Plan of Care Review  Description: The Plan of Care Review/Shift note should be completed every shift.  The Outcome Evaluation is a brief statement about your assessment that the patient is improving, declining, or no change.  This information will be displayed automatically on your shiftnote.  Outcome: Progressing  Flowsheets (Taken 3/21/2025 5730)  Outcome Evaluation: CIWA positive x1, valium PO given. PRN pain med for pain in the right shoulder per MAR.  Plan of Care Reviewed With: patient  Overall Patient Progress: no change  Goal: Patient-Specific Goal (Individualized)  Description: You can add care plan individualizations to a care plan. Examples of Individualization might be:  \"Parent requests to be called daily at 9am for status\", \"I have a hard time hearing out of my right ear\", or \"Do not touch me to wake me up as it startlesme\".  Outcome: Progressing  Goal: Absence of Hospital-Acquired Illness or Injury  Outcome: Progressing  Intervention: Identify and Manage Fall Risk  Recent Flowsheet Documentation  Taken 3/20/2025 1947 by Noreen Swanson RN  Safety Promotion/Fall Prevention:   safety round/check completed   supervised activity   room near nurse's station   increase visualization of patient  Intervention: Prevent Skin Injury  Recent Flowsheet Documentation  Taken 3/20/2025 1947 by Noreen Swanson RN  Body Position: position changed " independently  Intervention: Prevent Infection  Recent Flowsheet Documentation  Taken 3/20/2025 1947 by Noreen Swanson RN  Infection Prevention:   rest/sleep promoted   single patient room provided  Goal: Optimal Comfort and Wellbeing  Outcome: Progressing  Intervention: Monitor Pain and Promote Comfort  Recent Flowsheet Documentation  Taken 3/21/2025 0038 by Noreen Swanson RN  Pain Management Interventions: rest  Taken 3/20/2025 2344 by Noreen Swanson RN  Pain Management Interventions:   medication (see MAR)   rest  Taken 3/20/2025 1952 by Noreen Swanson RN  Pain Management Interventions:   medication (see MAR)   rest  Goal: Readiness for Transition of Care  Outcome: Progressing     Problem: Fall Injury Risk  Goal: Absence of Fall and Fall-Related Injury  Outcome: Progressing  Intervention: Identify and Manage Contributors  Recent Flowsheet Documentation  Taken 3/20/2025 1947 by Noreen Swanson RN  Medication Review/Management: medications reviewed  Intervention: Promote Injury-Free Environment  Recent Flowsheet Documentation  Taken 3/20/2025 1947 by Noreen Swanson RN  Safety Promotion/Fall Prevention:   safety round/check completed   supervised activity   room near nurse's station   increase visualization of patient     Problem: Alcohol Withdrawal  Goal: Alcohol Withdrawal Symptom Control  Outcome: Progressing  Goal: Optimal Neurologic Function  Outcome: Progressing  Goal: Readiness for Change Identified  Outcome: Progressing     Problem: Comorbidity Management  Goal: Maintenance of Seizure Control  Outcome: Progressing  Intervention: Maintain Seizure Symptom Control  Recent Flowsheet Documentation  Taken 3/20/2025 1947 by Noreen Swanson RN  Medication Review/Management: medications reviewed

## 2025-03-21 NOTE — PROGRESS NOTES
[facility]  [address] [place] [address]., [place] [address]      3/21/2025      Tello Franciscoskylarlisa  [address] [facility] [address]      Dear Mr. Jones,    I have created a list of different substance use disorder (DEYSI) treatment programs for you to look over. Inpatient DEYSI treatment is typically 30-45 days long where you live at the treatment program and focus on your new sobriety/recovery. Intensive Outpatient treatment is typically 3-4 days per week where you live at home and attend groups either during the day or in the evening.  Since you completed a DEYSI Comprehensive Assessment with me, you can let me know which program you would like to attend, and I will fax your assessment to the program of choice for you to get started. I will need you to sign a Release of Information before I fax your assessment. This Release of Information can be sent to you via [employer] (electronic) or via the Unit . If you have questions, my phone number is [phone].    Sury Rubio      [facility] DEYSI treatment programs:  [facility]: (in-person & virtual)  Main phone: [phone]  Direct Dial: [phone]   Admissions email: [email or web address]   DEYSI fax: [phone]  [email or web address]  *Groups are held on Mondays, Tuesdays, Wednesdays, or Thursdays, depending on the location and level of care.  *Groups are generally held from 8am - 12pm and 4pm - 7pm.     [facility]: (in-person)  Coordinator: Anna Lira  Phone: [phone]  email: [email or web address]  [email or web address]/[email or web address]/Substance-Use             [place] Co-occurring IOP Morning:  M,T,W 9am-12:00pm  [place] Co-occurring IOP Evening: M,T,W 5:00pm-8:00pm (accepts Medicare)  [place] [facility]: M,T,W 9a-12p     [place]  Phone: [phone]  Fax: [phone]  email: [email or web address]  https://www.[email or web address].org/     [facility] [place] (in-person  & virtual)  [address] [place] [address]  Phone: [phone]  Fax: [phone]  Email: info@[employer]  https://[employer]/[facility]/  *Day program meets Monday-Friday from 8:45am-12:30pm  *Evening program meets Monday-Thursday from 5:30pm-8:30pm  Virtual Group:   *Monday-Friday: 8:30am-12:30pm  *Monday-Friday 6pm-9pm     IP DEYSI treatment programs:  [place]:  [address] [place] [address]  Phone: [phone]  Fax: [phone]  email referrals: menscare@[facility].[employer].  https://www.[employer]/     [facility]:  [place] [address] , [place] [address], [facility], [phone]  Phone: [phone]   Fax: [phone]  Email: admissions@[email or web address]  https://www.[employer].org/     [facility]: (co-ed, 30-45 days long)  Ph: [phone]  Fax: [phone]  [address] [place] [address]  Email: [email or web address]  www.[email or web address].[employer]     [facility]  [address] [place] [address]  Phone: [phone]  Fax: [phone]  Email: admissions@[email or web address]  https://[employer]/mens-residential-[place]/     [facility]:  Access Team for Referrals  Phone: [phone]  Fax: [phone]  Email: [email or web address]  Email: [email or web address]   Email: [email or web address]  https://[employer]/residential-programs/     [place]  Phone: [phone]  Fax: [phone]  email: Michelle@[facility]  https://www.[facility]/       Kelly Freeman MA [facility]  CD Evaluation Counselor  [phone]      (Emailed to pt and unit SW today)

## 2025-03-22 VITALS
DIASTOLIC BLOOD PRESSURE: 72 MMHG | TEMPERATURE: 97.7 F | HEART RATE: 77 BPM | OXYGEN SATURATION: 92 % | SYSTOLIC BLOOD PRESSURE: 121 MMHG | HEIGHT: 72 IN | RESPIRATION RATE: 18 BRPM | BODY MASS INDEX: 25.73 KG/M2 | WEIGHT: 190 LBS

## 2025-03-22 LAB
PHOSPHATE SERPL-MCNC: 3.2 MG/DL (ref 2.5–4.5)
POTASSIUM SERPL-SCNC: 3.7 MMOL/L (ref 3.4–5.3)

## 2025-03-22 PROCEDURE — 84100 ASSAY OF PHOSPHORUS: CPT | Performed by: INTERNAL MEDICINE

## 2025-03-22 PROCEDURE — 36415 COLL VENOUS BLD VENIPUNCTURE: CPT | Performed by: INTERNAL MEDICINE

## 2025-03-22 PROCEDURE — 99238 HOSP IP/OBS DSCHRG MGMT 30/<: CPT | Performed by: INTERNAL MEDICINE

## 2025-03-22 PROCEDURE — 84132 ASSAY OF SERUM POTASSIUM: CPT | Performed by: INTERNAL MEDICINE

## 2025-03-22 PROCEDURE — 250N000013 HC RX MED GY IP 250 OP 250 PS 637: Performed by: INTERNAL MEDICINE

## 2025-03-22 RX ORDER — OXYCODONE HYDROCHLORIDE 5 MG/1
5 TABLET ORAL EVERY 4 HOURS PRN
Qty: 15 TABLET | Refills: 0 | Status: SHIPPED | OUTPATIENT
Start: 2025-03-22

## 2025-03-22 RX ADMIN — GABAPENTIN 900 MG: 300 CAPSULE ORAL at 04:53

## 2025-03-22 RX ADMIN — FAMOTIDINE 20 MG: 20 TABLET, FILM COATED ORAL at 09:02

## 2025-03-22 RX ADMIN — OXYCODONE HYDROCHLORIDE 5 MG: 5 TABLET ORAL at 09:04

## 2025-03-22 RX ADMIN — CLONIDINE HYDROCHLORIDE 0.1 MG: 0.1 TABLET ORAL at 04:54

## 2025-03-22 RX ADMIN — GABAPENTIN 900 MG: 300 CAPSULE ORAL at 11:54

## 2025-03-22 RX ADMIN — Medication 1 TABLET: at 09:02

## 2025-03-22 RX ADMIN — THIAMINE HCL TAB 100 MG 100 MG: 100 TAB at 09:02

## 2025-03-22 RX ADMIN — OXYCODONE HYDROCHLORIDE 5 MG: 5 TABLET ORAL at 04:57

## 2025-03-22 RX ADMIN — FOLIC ACID 1 MG: 1 TABLET ORAL at 09:02

## 2025-03-22 RX ADMIN — ACETAMINOPHEN 650 MG: 325 TABLET, FILM COATED ORAL at 09:02

## 2025-03-22 RX ADMIN — CLONIDINE HYDROCHLORIDE 0.1 MG: 0.1 TABLET ORAL at 11:54

## 2025-03-22 ASSESSMENT — ACTIVITIES OF DAILY LIVING (ADL)
ADLS_ACUITY_SCORE: 36
ADLS_ACUITY_SCORE: 36
ADLS_ACUITY_SCORE: 35
ADLS_ACUITY_SCORE: 36
ADLS_ACUITY_SCORE: 35
ADLS_ACUITY_SCORE: 35
ADLS_ACUITY_SCORE: 36

## 2025-03-22 NOTE — PLAN OF CARE
"A&OX4. LS clear. Vitals stable. Afebrile. On CIWA protocol and monitoring.       Problem: Adult Inpatient Plan of Care  Goal: Plan of Care Review  Description: The Plan of Care Review/Shift note should be completed every shift.  The Outcome Evaluation is a brief statement about your assessment that the patient is improving, declining, or no change.  This information will be displayed automatically on your shiftnote.  Outcome: Progressing  Flowsheets (Taken 3/21/2025 2228)  Outcome Evaluation: Continue with CIWA protocol and monitoring.  Plan of Care Reviewed With: patient  Overall Patient Progress: no change  Goal: Patient-Specific Goal (Individualized)  Description: You can add care plan individualizations to a care plan. Examples of Individualization might be:  \"Parent requests to be called daily at 9am for status\", \"I have a hard time hearing out of my right ear\", or \"Do not touch me to wake me up as it startlesme\".  Outcome: Progressing  Goal: Absence of Hospital-Acquired Illness or Injury  Outcome: Progressing  Intervention: Identify and Manage Fall Risk  Recent Flowsheet Documentation  Taken 3/21/2025 1947 by Shelley Myers, RN  Safety Promotion/Fall Prevention:   activity supervised   safety round/check completed   nonskid shoes/slippers when out of bed   patient and family education   room door open  Intervention: Prevent Infection  Recent Flowsheet Documentation  Taken 3/21/2025 1947 by Shelley Myers, RN  Infection Prevention:   single patient room provided   hand hygiene promoted  Goal: Optimal Comfort and Wellbeing  Outcome: Progressing  Intervention: Monitor Pain and Promote Comfort  Recent Flowsheet Documentation  Taken 3/21/2025 2131 by Shelley Myers, RN  Pain Management Interventions:   rest   repositioned  Goal: Readiness for Transition of Care  Outcome: Progressing     Problem: Fall Injury Risk  Goal: Absence of Fall and Fall-Related Injury  Outcome: Progressing  Intervention: Identify " and Manage Contributors  Recent Flowsheet Documentation  Taken 3/21/2025 1947 by Shelley Myers, RN  Medication Review/Management: medications reviewed  Intervention: Promote Injury-Free Environment  Recent Flowsheet Documentation  Taken 3/21/2025 1947 by Shelley Myers, RN  Safety Promotion/Fall Prevention:   activity supervised   safety round/check completed   nonskid shoes/slippers when out of bed   patient and family education   room door open     Problem: Alcohol Withdrawal  Goal: Alcohol Withdrawal Symptom Control  Outcome: Progressing  Goal: Optimal Neurologic Function  Outcome: Progressing  Goal: Readiness for Change Identified  Outcome: Progressing     Problem: Comorbidity Management  Goal: Maintenance of Seizure Control  Outcome: Progressing  Intervention: Maintain Seizure Symptom Control  Recent Flowsheet Documentation  Taken 3/21/2025 1947 by Shelley Myers RN  Medication Review/Management: medications reviewed   Goal Outcome Evaluation:      Plan of Care Reviewed With: patient    Overall Patient Progress: no changeOverall Patient Progress: no change    Outcome Evaluation: Continue with [facility] protocol and monitoring.

## 2025-03-22 NOTE — PROGRESS NOTES
11:58 Discharge instructions reviewed with the patient and girlfriend Unique. Verbal & written education provided in regards to the oxycodone RX. Discharge to home w/Unique via staff w/c when medication arrives from [facility]    Pt discharge w/rx - staff w/c to ER for  by Unique.

## 2025-03-22 NOTE — PLAN OF CARE
"/80  Pulse 77   Temp 98.4  F  Resp 18  SpO2 96%    Patient is alert and oriented x4, on CIWA protocol, SBA with transfers, continent of bowel and bladder.      Goal Outcome Evaluation:      Plan of Care Reviewed With: patient    Overall Patient Progress: no changeOverall Patient Progress: no change    Outcome Evaluation: Patient is alert and oriented x4, on CIWA protocol, SBA with transfers, continent of bowel and bladder.      Problem: Adult Inpatient Plan of Care  Goal: Plan of Care Review  Description: The Plan of Care Review/Shift note should be completed every shift.  The Outcome Evaluation is a brief statement about your assessment that the patient is improving, declining, or no change.  This information will be displayed automatically on your shiftnote.  Outcome: Progressing  Flowsheets (Taken 3/22/2025 0301)  Outcome Evaluation: Patient is alert and oriented x4, on CIWA protocol, SBA with transfers, continent of bowel and bladder.  Plan of Care Reviewed With: patient  Overall Patient Progress: no change  Goal: Patient-Specific Goal (Individualized)  Description: You can add care plan individualizations to a care plan. Examples of Individualization might be:  \"Parent requests to be called daily at 9am for status\", \"I have a hard time hearing out of my right ear\", or \"Do not touch me to wake me up as it startlesme\".  Outcome: Progressing  Goal: Absence of Hospital-Acquired Illness or Injury  Outcome: Progressing  Intervention: Identify and Manage Fall Risk  Recent Flowsheet Documentation  Taken 3/21/2025 2340 by Janeth Cole, RN  Safety Promotion/Fall Prevention:   activity supervised   clutter free environment maintained   lighting adjusted   nonskid shoes/slippers when out of bed   patient and family education   room near nurse's station   room organization consistent   safety round/check completed  Intervention: Prevent Skin Injury  Recent Flowsheet Documentation  Taken 3/21/2025 2340 by Kelsey, " Janeth, RN  Body Position: position changed independently  Intervention: Prevent and Manage VTE (Venous Thromboembolism) Risk  Recent Flowsheet Documentation  Taken 3/21/2025 2340 by Janeth Cole RN  VTE Prevention/Management: SCDs off (sequential compression devices)  Intervention: Prevent Infection  Recent Flowsheet Documentation  Taken 3/21/2025 2340 by Janeth Cole, RN  Infection Prevention:   cohorting utilized   hand hygiene promoted   rest/sleep promoted   single patient room provided  Goal: Optimal Comfort and Wellbeing  Outcome: Progressing  Intervention: Monitor Pain and Promote Comfort  Recent Flowsheet Documentation  Taken 3/21/2025 2340 by Janeth Cole, RN  Pain Management Interventions: rest  Goal: Readiness for Transition of Care  Outcome: Progressing

## 2025-03-22 NOTE — DISCHARGE SUMMARY
[facility]  Hospitalist Discharge Summary      Date of Admission:  3/19/2025  Date of Discharge:  3/22/2025  Discharging Provider: Wil Mckeon MD  Discharge Service: Hospitalist Service    Discharge Diagnoses   Alcohol dependence  -CD consult this admission.  Patient given multiple options for outpatient versus inpatient CD therapy if he wishes to pursue it    Alcohol withdrawal    Alcohol withdrawal seizure    Right shoulder dislocation complicated by proximal right humerus and right glenoid fractures in the setting of alcohol withdrawal seizure  -Right shoulder dislocation reduced in the emergency department  -Patient was evaluated by orthopedic surgery this admission who recommend nonoperative management    Past medical history:  Splenic laceration December 2024  Thoracic and lumbar spine fractures in December 2024 following alcohol drawl seizure  Reflux disease    Clinically Significant Risk Factors     # Overweight: Estimated body mass index is 25.77 kg/m  as calculated from the following:    Height as of this encounter: 1.829 m (6').    Weight as of this encounter: 86.2 kg (190 lb).       Follow-ups Needed After Discharge   Follow-up Appointments       Hospital Follow-up with Existing Primary Care Provider (PCP)      Please see details below         Schedule Primary Care visit within: 7 Days               Unresulted Labs Ordered in the Past 30 Days of this Admission       No orders found from 2/17/2025 to 3/20/2025.            Discharge Disposition   Discharged to home  Condition at discharge: Stable    Hospital Course   39 year old male admitted on 3/19/2025 following an alcohol withdrawal seizure complicated by right shoulder dislocation and fracture.  Shoulder dislocation was reduced in the ER prior to admission    He has a past medical history of ongoing active heavy alcohol abuse, recurrent alcohol withdrawal seizures, alcohol-related pancreatitis, history of splenic  laceration in December 2024, fracture of thoracic and lumbar's vertebrae in December 2024 following alcohol withdrawal seizure, acid reflux disease, and failure to maintain sobriety.     The patient was admitted to the hospitalist service.  Orthopedic surgery was consulted to address the patient's shoulder fracture.  They recommended nonoperative management    In regards to the patient's alcohol drawl, symptoms improved while hospitalized.  He did not have any recurrent seizures while in the hospital.    Complete alcohol cessation advised to the patient.  Chemical dependency consult was obtained.  Patient was given multiple outpatient and inpatient options for CD treatment.  He is not yet sure what he wants to do going forward but was provided information to make arrangements if he wants to pursue CD treatment    Patient appeared medically stable for discharge from the hospital today    Consultations This Hospital Stay   CARE MANAGEMENT / SOCIAL WORK IP CONSULT  ORTHOPEDIC SURGERY IP CONSULT  CARE MANAGEMENT / SOCIAL WORK IP CONSULT  CHEMICAL DEPENDENCY IP CONSULT    Code Status   Full Code    Time Spent on this Encounter   I, Wil Mckeon MD, personally saw the patient today and spent less than or equal to 30 minutes discharging this patient.       Wil Mckeon MD  [place] [address] [facility]  [address] [place] [address]  Phone: [phone]  Fax: [phone]  ______________________________________________________________________    Physical Exam   Vital Signs: Temp: 97.7  F (36.5  C) Temp src: Oral BP: 121/72 Pulse: 77   Resp: 18 SpO2: 92 % O2 Device: None (Room air)    Weight: 190 lbs 0 oz  Awake, alert, oriented x 3  Cardiovascular: Regular rate and rhythm  Pulmonary: Lungs clear to auscultation bilaterally  Abdomen: Nontender/nondistended.  Soft  Right upper extremity: Right hand warm.  Right radial pulse intact.  Fingers mobile.  Sensitive to touch        Primary Care Physician   [facility]    Discharge Orders      Reason for your hospital stay    Alcohol withdrawal seizure  Right shoulder dislocation with fracture     Activity    Your activity upon discharge: No weight bearing or lifting using right upper extremity     Discharge Instructions    Follow-up at [facility] either with Dr Bonner in [place] or shoulder provider in [place] 2 weeks following your injury for follow-up xrays.  Call [phone] for appointment/questions.     Diet    Follow this diet upon discharge: regular diet     Hospital Follow-up with Existing Primary Care Provider (PCP)    Please see details below            Significant Results and Procedures   Results for orders placed or performed during the hospital encounter of 03/19/25   Head CT w/o contrast    Narrative    EXAM: CT HEAD W/O CONTRAST  LOCATION: [facility]  DATE: 3/19/2025    INDICATION: fall, Sz, etoh w d?  COMPARISON: None.  TECHNIQUE: Routine CT Head without IV contrast. Multiplanar reformats. Dose reduction techniques were used.    FINDINGS:  INTRACRANIAL CONTENTS: No evidence of acute intracranial hemorrhage or mass effect. Brain attenuation and morphology are normal. The ventricles and sulci are normal for age. Normal gray-white matter differentiation. The basilar cisterns are patent.    VISUALIZED ORBITS/SINUSES/MASTOIDS: The globes are unremarkable. The partially imaged paranasal sinuses, mastoid air cells and middle ear cavities are unremarkable.     BONES/SOFT TISSUES: The visualized skull base and calvarium are unremarkable.      Impression    IMPRESSION:    1.  No evidence of acute intracranial hemorrhage or mass effect.   Humerus XR, G/E 2 views, right    Narrative    EXAM: XR HUMERUS RIGHT G/E 2 VIEWS, XR SHOULDER RIGHT 2 VIEWS  LOCATION: [facility]  DATE: 3/19/2025    INDICATION: fall, pain  COMPARISON: None.      Impression     IMPRESSION:     There is anterior dislocation of the right humeral head relative to the glenoid, with an associated acute, displaced fracture of the posterosuperior humeral head involving the greater tuberosity and extending vertically into the humeral neck. Question a   small acute fracture of the anterior glenoid as well. Postreduction radiographs would be helpful.   CT Cervical Spine w/o Contrast    Narrative    EXAM: CT CERVICAL SPINE W/O CONTRAST  LOCATION: [facility]  DATE: 3/19/2025    INDICATION: etoh withdrawal, seizure  COMPARISON: None.  TECHNIQUE: Routine CT Cervical Spine without IV contrast. Multiplanar reformats. Dose reduction techniques were used.    FINDINGS:  No evidence of acute fracture or subluxation of the cervical spine by CT imaging. The vertebral bodies of the cervical spine have normal stature and alignment. The disc spaces are well-maintained. No significant degenerative changes. Soft tissues   unremarkable. No extraspinal abnormality.     Craniovertebral junction and C1-C2: Normal.    C2-C3: Normal disc height. No herniation. Normal facets. No spinal canal or neural foraminal stenosis.     C3-C4: Normal disc height. No herniation. Normal facets. No spinal canal or neural foraminal stenosis.     C4-C5: Normal disc height. No herniation. Normal facets. No spinal canal or neural foraminal stenosis.     C5-C6: Normal disc height. No herniation. Normal facets. No spinal canal or neural foraminal stenosis.     C6-C7: Normal disc height. No herniation. Normal facets. No spinal canal or neural foraminal stenosis.     C7-T1: Normal disc height. No herniation. Normal facets. No spinal canal or neural foraminal stenosis.       Impression    IMPRESSION:  1.  No evidence of acute fracture or subluxation of the cervical spine by CT imaging.   XR Shoulder Right 2 Views    Narrative    EXAM: XR HUMERUS RIGHT G/E 2 VIEWS, XR SHOULDER RIGHT 2 VIEWS  LOCATION: [facility]  DATE: 3/19/2025    INDICATION: fall, pain  COMPARISON: None.      Impression    IMPRESSION:     There is anterior dislocation of the right humeral head relative to the glenoid, with an associated acute, displaced fracture of the posterosuperior humeral head involving the greater tuberosity and extending vertically into the humeral neck. Question a   small acute fracture of the anterior glenoid as well. Postreduction radiographs would be helpful.   Shoulder XR, right, 2-3 vw PORTABLE    Narrative    EXAM: XR SHOULDER RIGHT PORT G/E 2 VIEWS  LOCATION: [facility]  DATE: 3/19/2025    INDICATION: reduction  COMPARISON: None.      Impression    IMPRESSION:     The previously seen right shoulder dislocation has been successfully reduced. There is improved alignment of the proximal right humerus fracture which involves the greater tuberosity and extends vertically into the neck. There is also a small   nondisplaced fracture off of the anteroinferior glenoid.   CT Shoulder Right w/o Contrast    Narrative    EXAM: CT SHOULDER RIGHT W/O CONTRAST  LOCATION: [facility]  DATE: 3/19/2025    INDICATION: Right shoulder dislocation with fracture.  COMPARISON: 3/19/2025.  TECHNIQUE: Noncontrast. Axial, sagittal and coronal thin-section reconstruction. Dose reduction techniques were used.     FINDINGS:     There is a comminuted and mildly displaced fracture of the proximal right humerus, with mild impaction of the posterolateral humeral head, consistent with a superimposed Hill-Sachs morphology. Fracture components extend to involve the greater and lesser   tuberosities, as well as the posterior anterior aspect of the proximal humeral metadiaphysis. Humeral articular surface appears spared.     There are associated comminuted and mild to moderately displaced fracture fragment along the inferior rim of the glenoid.    Associated soft tissue swelling within the deep right  shoulder soft tissues. Small elbow joint effusion.    Acromioclavicular and glenohumeral joints appear normal. No suspicious lytic or blastic osseous lesions.    Rotator cuff musculature is normal in bulk and signal attenuation.      Impression    IMPRESSION:    1. Comminuted and mildly displaced fracture of the proximal right humerus.  2. Comminuted and displaced fracture fragment along the inferior rim of the right glenoid.         Discharge Medications   Current Discharge Medication List        START taking these medications    Details   oxyCODONE (ROXICODONE) 5 MG tablet Take 1 tablet (5 mg) by mouth every 4 hours as needed for severe pain.  Qty: 15 tablet, Refills: 0    Associated Diagnoses: Closed displaced fracture of greater tuberosity of right humerus, initial encounter           CONTINUE these medications which have NOT CHANGED    Details   acamprosate (CAMPRAL) 333 MG EC tablet Take 666 mg by mouth 3 times daily.      amitriptyline (ELAVIL) 50 MG tablet Take 50 mg by mouth at bedtime.      multivitamin (CENTRUM SILVER) tablet Take 1 tablet by mouth daily    Associated Diagnoses: Alcohol-induced acute pancreatitis with uninfected necrosis; Alcohol withdrawal, uncomplicated (H)      pantoprazole (PROTONIX) 40 MG EC tablet Take 40 mg by mouth daily.      sertraline (ZOLOFT) 100 MG tablet Take 150 mg by mouth daily.      traZODone (DESYREL) 100 MG tablet Take 200 mg by mouth at bedtime.           Allergies   Allergies   Allergen Reactions    Cefaclor Rash

## 2025-04-01 ENCOUNTER — ANESTHESIA EVENT (OUTPATIENT)
Dept: SURGERY | Facility: CLINIC | Age: 40
End: 2025-04-01
Payer: COMMERCIAL

## 2025-04-01 DIAGNOSIS — S32.009A CLOSED FRACTURE OF TRANSVERSE PROCESS OF LUMBAR VERTEBRA, INITIAL ENCOUNTER (H): Primary | ICD-10-CM

## 2025-04-01 DIAGNOSIS — S42.91XD TRAUMATIC CLOSED DISPLACED FRACTURE OF RIGHT SHOULDER WITH ANTERIOR DISLOCATION WITH ROUTINE HEALING, SUBSEQUENT ENCOUNTER: ICD-10-CM

## 2025-04-02 ENCOUNTER — HOSPITAL ENCOUNTER (OUTPATIENT)
Facility: CLINIC | Age: 40
Discharge: HOME | End: 2025-04-02
Attending: STUDENT IN AN ORGANIZED HEALTH CARE EDUCATION/TRAINING PROGRAM | Admitting: STUDENT IN AN ORGANIZED HEALTH CARE EDUCATION/TRAINING PROGRAM
Payer: COMMERCIAL

## 2025-04-02 ENCOUNTER — ANESTHESIA (OUTPATIENT)
Dept: SURGERY | Facility: CLINIC | Age: 40
End: 2025-04-02
Payer: COMMERCIAL

## 2025-04-02 VITALS
HEIGHT: 72 IN | DIASTOLIC BLOOD PRESSURE: 77 MMHG | BODY MASS INDEX: 25.81 KG/M2 | TEMPERATURE: 97.8 F | HEART RATE: 91 BPM | RESPIRATION RATE: 12 BRPM | SYSTOLIC BLOOD PRESSURE: 118 MMHG | WEIGHT: 190.56 LBS | OXYGEN SATURATION: 97 %

## 2025-04-02 DIAGNOSIS — S42.91XA TRAUMATIC CLOSED DISPLACED FRACTURE OF RIGHT SHOULDER WITH ANTERIOR DISLOCATION, INITIAL ENCOUNTER: Primary | ICD-10-CM

## 2025-04-02 PROCEDURE — 370N000017 HC ANESTHESIA TECHNICAL FEE, PER MIN: Performed by: STUDENT IN AN ORGANIZED HEALTH CARE EDUCATION/TRAINING PROGRAM

## 2025-04-02 PROCEDURE — 710N000012 HC RECOVERY PHASE 2, PER MINUTE: Performed by: STUDENT IN AN ORGANIZED HEALTH CARE EDUCATION/TRAINING PROGRAM

## 2025-04-02 PROCEDURE — 250N000025 HC SEVOFLURANE, PER MIN: Performed by: STUDENT IN AN ORGANIZED HEALTH CARE EDUCATION/TRAINING PROGRAM

## 2025-04-02 PROCEDURE — 250N000011 HC RX IP 250 OP 636: Mod: JZ | Performed by: ANESTHESIOLOGY

## 2025-04-02 PROCEDURE — 710N000009 HC RECOVERY PHASE 1, LEVEL 1, PER MIN: Performed by: STUDENT IN AN ORGANIZED HEALTH CARE EDUCATION/TRAINING PROGRAM

## 2025-04-02 PROCEDURE — 258N000003 HC RX IP 258 OP 636: Performed by: ANESTHESIOLOGY

## 2025-04-02 PROCEDURE — 250N000011 HC RX IP 250 OP 636: Performed by: ANESTHESIOLOGY

## 2025-04-02 PROCEDURE — 250N000009 HC RX 250

## 2025-04-02 PROCEDURE — 250N000011 HC RX IP 250 OP 636: Performed by: NURSE ANESTHETIST, CERTIFIED REGISTERED

## 2025-04-02 PROCEDURE — 258N000003 HC RX IP 258 OP 636: Performed by: NURSE ANESTHETIST, CERTIFIED REGISTERED

## 2025-04-02 PROCEDURE — 360N000084 HC SURGERY LEVEL 4 W/ FLUORO, PER MIN: Performed by: STUDENT IN AN ORGANIZED HEALTH CARE EDUCATION/TRAINING PROGRAM

## 2025-04-02 PROCEDURE — 250N000013 HC RX MED GY IP 250 OP 250 PS 637

## 2025-04-02 PROCEDURE — 250N000009 HC RX 250: Performed by: NURSE ANESTHETIST, CERTIFIED REGISTERED

## 2025-04-02 PROCEDURE — 250N000011 HC RX IP 250 OP 636

## 2025-04-02 PROCEDURE — 250N000009 HC RX 250: Performed by: ANESTHESIOLOGY

## 2025-04-02 PROCEDURE — 272N000001 HC OR GENERAL SUPPLY STERILE: Performed by: STUDENT IN AN ORGANIZED HEALTH CARE EDUCATION/TRAINING PROGRAM

## 2025-04-02 PROCEDURE — 999N000141 HC STATISTIC PRE-PROCEDURE NURSING ASSESSMENT: Performed by: STUDENT IN AN ORGANIZED HEALTH CARE EDUCATION/TRAINING PROGRAM

## 2025-04-02 PROCEDURE — C1713 ANCHOR/SCREW BN/BN,TIS/BN: HCPCS | Performed by: STUDENT IN AN ORGANIZED HEALTH CARE EDUCATION/TRAINING PROGRAM

## 2025-04-02 PROCEDURE — 250N000013 HC RX MED GY IP 250 OP 250 PS 637: Performed by: ANESTHESIOLOGY

## 2025-04-02 DEVICE — IMP WIRE KIRSCHNER 2.0X150MM 292.20: Type: IMPLANTABLE DEVICE | Site: ARM | Status: FUNCTIONAL

## 2025-04-02 DEVICE — IMP WIRE KIRSCHNER 1.6X150MM 292.71: Type: IMPLANTABLE DEVICE | Site: ARM | Status: FUNCTIONAL

## 2025-04-02 DEVICE — IMP SCR SYN 3.5X45MM LOCKING W/STARDRIVE SS 212.119: Type: IMPLANTABLE DEVICE | Site: ARM | Status: FUNCTIONAL

## 2025-04-02 DEVICE — IMP PLATE SYN LCP PROX HUM 3.5MM 03H SS 241.901: Type: IMPLANTABLE DEVICE | Site: ARM | Status: FUNCTIONAL

## 2025-04-02 DEVICE — IMP SCR SYN 3.5X50MM LOCKING W/STARDRIVE SS 212.121: Type: IMPLANTABLE DEVICE | Site: ARM | Status: FUNCTIONAL

## 2025-04-02 DEVICE — IMP SCR SYN 3.5X36MM LOCKING W/STARDRIVE SS 212.115: Type: IMPLANTABLE DEVICE | Site: ARM | Status: FUNCTIONAL

## 2025-04-02 DEVICE — IMP SCR SYN 3.5X55MM LOCKING W/STARDRIVE SS 212.123: Type: IMPLANTABLE DEVICE | Site: ARM | Status: FUNCTIONAL

## 2025-04-02 DEVICE — IMP SCR SYN CORTEX 3.5X28MM SELF TAP SS 204.828: Type: IMPLANTABLE DEVICE | Site: ARM | Status: FUNCTIONAL

## 2025-04-02 RX ORDER — ONDANSETRON 4 MG/1
4 TABLET, ORALLY DISINTEGRATING ORAL EVERY 30 MIN PRN
Status: DISCONTINUED | OUTPATIENT
Start: 2025-04-02 | End: 2025-04-02

## 2025-04-02 RX ORDER — PROPOFOL 10 MG/ML
INJECTION, EMULSION INTRAVENOUS PRN
Status: DISCONTINUED | OUTPATIENT
Start: 2025-04-02 | End: 2025-04-02

## 2025-04-02 RX ORDER — NALOXONE HYDROCHLORIDE 0.4 MG/ML
0.1 INJECTION, SOLUTION INTRAMUSCULAR; INTRAVENOUS; SUBCUTANEOUS
Status: DISCONTINUED | OUTPATIENT
Start: 2025-04-02 | End: 2025-04-02

## 2025-04-02 RX ORDER — ACETAMINOPHEN 325 MG/1
650 TABLET ORAL
Status: DISCONTINUED | OUTPATIENT
Start: 2025-04-02 | End: 2025-04-02 | Stop reason: HOSPADM

## 2025-04-02 RX ORDER — SODIUM CHLORIDE, SODIUM LACTATE, POTASSIUM CHLORIDE, CALCIUM CHLORIDE 600; 310; 30; 20 MG/100ML; MG/100ML; MG/100ML; MG/100ML
INJECTION, SOLUTION INTRAVENOUS CONTINUOUS
Status: DISCONTINUED | OUTPATIENT
Start: 2025-04-02 | End: 2025-04-02 | Stop reason: HOSPADM

## 2025-04-02 RX ORDER — METHOCARBAMOL 750 MG/1
750 TABLET, FILM COATED ORAL
Status: DISCONTINUED | OUTPATIENT
Start: 2025-04-02 | End: 2025-04-02 | Stop reason: HOSPADM

## 2025-04-02 RX ORDER — KETOROLAC TROMETHAMINE 15 MG/ML
15 INJECTION, SOLUTION INTRAMUSCULAR; INTRAVENOUS ONCE
Status: COMPLETED | OUTPATIENT
Start: 2025-04-02 | End: 2025-04-02

## 2025-04-02 RX ORDER — OXYCODONE HYDROCHLORIDE 5 MG/1
5 TABLET ORAL
Status: COMPLETED | OUTPATIENT
Start: 2025-04-02 | End: 2025-04-02

## 2025-04-02 RX ORDER — ASPIRIN 81 MG/1
81 TABLET ORAL DAILY
Qty: 60 TABLET | Refills: 0 | Status: SHIPPED | OUTPATIENT
Start: 2025-04-02

## 2025-04-02 RX ORDER — ONDANSETRON 2 MG/ML
INJECTION INTRAMUSCULAR; INTRAVENOUS PRN
Status: DISCONTINUED | OUTPATIENT
Start: 2025-04-02 | End: 2025-04-02

## 2025-04-02 RX ORDER — ACETAMINOPHEN 325 MG/1
975 TABLET ORAL
Status: COMPLETED | OUTPATIENT
Start: 2025-04-02 | End: 2025-04-02

## 2025-04-02 RX ORDER — ONDANSETRON 2 MG/ML
4 INJECTION INTRAMUSCULAR; INTRAVENOUS EVERY 30 MIN PRN
Status: DISCONTINUED | OUTPATIENT
Start: 2025-04-02 | End: 2025-04-02 | Stop reason: HOSPADM

## 2025-04-02 RX ORDER — HYDROMORPHONE HCL IN WATER/PF 6 MG/30 ML
0.4 PATIENT CONTROLLED ANALGESIA SYRINGE INTRAVENOUS EVERY 5 MIN PRN
Status: DISCONTINUED | OUTPATIENT
Start: 2025-04-02 | End: 2025-04-02 | Stop reason: HOSPADM

## 2025-04-02 RX ORDER — CEFAZOLIN SODIUM/WATER 2 G/20 ML
2 SYRINGE (ML) INTRAVENOUS
Status: COMPLETED | OUTPATIENT
Start: 2025-04-02 | End: 2025-04-02

## 2025-04-02 RX ORDER — LIDOCAINE 40 MG/G
CREAM TOPICAL
Status: DISCONTINUED | OUTPATIENT
Start: 2025-04-02 | End: 2025-04-02 | Stop reason: HOSPADM

## 2025-04-02 RX ORDER — METHOCARBAMOL 750 MG/1
750 TABLET, FILM COATED ORAL 4 TIMES DAILY PRN
Qty: 25 TABLET | Refills: 0 | Status: SHIPPED | OUTPATIENT
Start: 2025-04-02

## 2025-04-02 RX ORDER — NALOXONE HYDROCHLORIDE 0.4 MG/ML
0.1 INJECTION, SOLUTION INTRAMUSCULAR; INTRAVENOUS; SUBCUTANEOUS
Status: DISCONTINUED | OUTPATIENT
Start: 2025-04-02 | End: 2025-04-02 | Stop reason: HOSPADM

## 2025-04-02 RX ORDER — FENTANYL CITRATE 50 UG/ML
50 INJECTION, SOLUTION INTRAMUSCULAR; INTRAVENOUS EVERY 5 MIN PRN
Status: DISCONTINUED | OUTPATIENT
Start: 2025-04-02 | End: 2025-04-02 | Stop reason: HOSPADM

## 2025-04-02 RX ORDER — BUPIVACAINE HYDROCHLORIDE AND EPINEPHRINE 5; 5 MG/ML; UG/ML
INJECTION, SOLUTION PERINEURAL
Status: COMPLETED | OUTPATIENT
Start: 2025-04-02 | End: 2025-04-02

## 2025-04-02 RX ORDER — FENTANYL CITRATE 50 UG/ML
25 INJECTION, SOLUTION INTRAMUSCULAR; INTRAVENOUS EVERY 5 MIN PRN
Status: DISCONTINUED | OUTPATIENT
Start: 2025-04-02 | End: 2025-04-02 | Stop reason: HOSPADM

## 2025-04-02 RX ORDER — FENTANYL CITRATE 50 UG/ML
INJECTION, SOLUTION INTRAMUSCULAR; INTRAVENOUS
Status: COMPLETED | OUTPATIENT
Start: 2025-04-02 | End: 2025-04-02

## 2025-04-02 RX ORDER — AMOXICILLIN 250 MG
1-2 CAPSULE ORAL 2 TIMES DAILY
Qty: 30 TABLET | Refills: 0 | Status: SHIPPED | OUTPATIENT
Start: 2025-04-02

## 2025-04-02 RX ORDER — ONDANSETRON 4 MG/1
4 TABLET, ORALLY DISINTEGRATING ORAL EVERY 30 MIN PRN
Status: DISCONTINUED | OUTPATIENT
Start: 2025-04-02 | End: 2025-04-02 | Stop reason: HOSPADM

## 2025-04-02 RX ORDER — TRANEXAMIC ACID 10 MG/ML
1 INJECTION, SOLUTION INTRAVENOUS ONCE
Status: DISCONTINUED | OUTPATIENT
Start: 2025-04-02 | End: 2025-04-02 | Stop reason: HOSPADM

## 2025-04-02 RX ORDER — CEFAZOLIN SODIUM/WATER 2 G/20 ML
2 SYRINGE (ML) INTRAVENOUS SEE ADMIN INSTRUCTIONS
Status: DISCONTINUED | OUTPATIENT
Start: 2025-04-02 | End: 2025-04-02 | Stop reason: HOSPADM

## 2025-04-02 RX ORDER — DEXAMETHASONE SODIUM PHOSPHATE 4 MG/ML
INJECTION, SOLUTION INTRA-ARTICULAR; INTRALESIONAL; INTRAMUSCULAR; INTRAVENOUS; SOFT TISSUE PRN
Status: DISCONTINUED | OUTPATIENT
Start: 2025-04-02 | End: 2025-04-02

## 2025-04-02 RX ORDER — HYDROMORPHONE HCL IN WATER/PF 6 MG/30 ML
0.2 PATIENT CONTROLLED ANALGESIA SYRINGE INTRAVENOUS EVERY 5 MIN PRN
Status: DISCONTINUED | OUTPATIENT
Start: 2025-04-02 | End: 2025-04-02 | Stop reason: HOSPADM

## 2025-04-02 RX ORDER — DEXAMETHASONE SODIUM PHOSPHATE 4 MG/ML
4 INJECTION, SOLUTION INTRA-ARTICULAR; INTRALESIONAL; INTRAMUSCULAR; INTRAVENOUS; SOFT TISSUE
Status: DISCONTINUED | OUTPATIENT
Start: 2025-04-02 | End: 2025-04-02 | Stop reason: HOSPADM

## 2025-04-02 RX ORDER — ACETAMINOPHEN 325 MG/1
650 TABLET ORAL EVERY 4 HOURS PRN
Qty: 50 TABLET | Refills: 0 | Status: SHIPPED | OUTPATIENT
Start: 2025-04-02

## 2025-04-02 RX ORDER — OXYCODONE HYDROCHLORIDE 5 MG/1
5 TABLET ORAL
Status: DISCONTINUED | OUTPATIENT
Start: 2025-04-02 | End: 2025-04-02

## 2025-04-02 RX ORDER — TRANEXAMIC ACID 10 MG/ML
1 INJECTION, SOLUTION INTRAVENOUS ONCE
Status: COMPLETED | OUTPATIENT
Start: 2025-04-02 | End: 2025-04-02

## 2025-04-02 RX ORDER — IBUPROFEN 600 MG/1
600 TABLET, FILM COATED ORAL EVERY 6 HOURS PRN
Qty: 30 TABLET | Refills: 0 | Status: SHIPPED | OUTPATIENT
Start: 2025-04-02

## 2025-04-02 RX ORDER — ONDANSETRON 2 MG/ML
4 INJECTION INTRAMUSCULAR; INTRAVENOUS EVERY 30 MIN PRN
Status: DISCONTINUED | OUTPATIENT
Start: 2025-04-02 | End: 2025-04-02

## 2025-04-02 RX ORDER — LABETALOL HYDROCHLORIDE 5 MG/ML
10 INJECTION, SOLUTION INTRAVENOUS
Status: DISCONTINUED | OUTPATIENT
Start: 2025-04-02 | End: 2025-04-02 | Stop reason: HOSPADM

## 2025-04-02 RX ORDER — ONDANSETRON 4 MG/1
4 TABLET, ORALLY DISINTEGRATING ORAL
Status: DISCONTINUED | OUTPATIENT
Start: 2025-04-02 | End: 2025-04-02 | Stop reason: HOSPADM

## 2025-04-02 RX ORDER — LIDOCAINE HYDROCHLORIDE 20 MG/ML
INJECTION, SOLUTION INFILTRATION; PERINEURAL PRN
Status: DISCONTINUED | OUTPATIENT
Start: 2025-04-02 | End: 2025-04-02

## 2025-04-02 RX ORDER — DEXAMETHASONE SODIUM PHOSPHATE 4 MG/ML
4 INJECTION, SOLUTION INTRA-ARTICULAR; INTRALESIONAL; INTRAMUSCULAR; INTRAVENOUS; SOFT TISSUE
Status: DISCONTINUED | OUTPATIENT
Start: 2025-04-02 | End: 2025-04-02

## 2025-04-02 RX ORDER — OXYCODONE HYDROCHLORIDE 5 MG/1
5-10 TABLET ORAL EVERY 4 HOURS PRN
Qty: 20 TABLET | Refills: 0 | Status: SHIPPED | OUTPATIENT
Start: 2025-04-02

## 2025-04-02 RX ADMIN — ROCURONIUM BROMIDE 20 MG: 50 INJECTION, SOLUTION INTRAVENOUS at 08:42

## 2025-04-02 RX ADMIN — Medication 2 G: at 07:32

## 2025-04-02 RX ADMIN — PHENYLEPHRINE HYDROCHLORIDE 0.5 MCG/KG/MIN: 10 INJECTION INTRAVENOUS at 07:49

## 2025-04-02 RX ADMIN — PROPOFOL 200 MG: 10 INJECTION, EMULSION INTRAVENOUS at 07:37

## 2025-04-02 RX ADMIN — SODIUM CHLORIDE, SODIUM LACTATE, POTASSIUM CHLORIDE, AND CALCIUM CHLORIDE: .6; .31; .03; .02 INJECTION, SOLUTION INTRAVENOUS at 08:37

## 2025-04-02 RX ADMIN — FENTANYL CITRATE 50 MCG: 50 INJECTION, SOLUTION INTRAMUSCULAR; INTRAVENOUS at 10:27

## 2025-04-02 RX ADMIN — DEXAMETHASONE SODIUM PHOSPHATE 8 MG: 4 INJECTION, SOLUTION INTRA-ARTICULAR; INTRALESIONAL; INTRAMUSCULAR; INTRAVENOUS; SOFT TISSUE at 07:37

## 2025-04-02 RX ADMIN — SODIUM CHLORIDE, SODIUM LACTATE, POTASSIUM CHLORIDE, AND CALCIUM CHLORIDE: .6; .31; .03; .02 INJECTION, SOLUTION INTRAVENOUS at 07:32

## 2025-04-02 RX ADMIN — BUPIVACAINE HYDROCHLORIDE AND EPINEPHRINE BITARTRATE 20 ML: 5; .005 INJECTION, SOLUTION PERINEURAL at 07:00

## 2025-04-02 RX ADMIN — HYDROMORPHONE HYDROCHLORIDE 0.5 MG: 1 INJECTION, SOLUTION INTRAMUSCULAR; INTRAVENOUS; SUBCUTANEOUS at 09:56

## 2025-04-02 RX ADMIN — ROCURONIUM BROMIDE 50 MG: 50 INJECTION, SOLUTION INTRAVENOUS at 07:37

## 2025-04-02 RX ADMIN — HYDROMORPHONE HYDROCHLORIDE 0.2 MG: 0.2 INJECTION, SOLUTION INTRAMUSCULAR; INTRAVENOUS; SUBCUTANEOUS at 11:34

## 2025-04-02 RX ADMIN — ONDANSETRON 4 MG: 2 INJECTION INTRAMUSCULAR; INTRAVENOUS at 09:56

## 2025-04-02 RX ADMIN — FENTANYL CITRATE 100 MCG: 50 INJECTION INTRAMUSCULAR; INTRAVENOUS at 07:00

## 2025-04-02 RX ADMIN — ROCURONIUM BROMIDE 30 MG: 50 INJECTION, SOLUTION INTRAVENOUS at 08:04

## 2025-04-02 RX ADMIN — DEXMEDETOMIDINE HYDROCHLORIDE 8 MCG: 100 INJECTION, SOLUTION INTRAVENOUS at 07:40

## 2025-04-02 RX ADMIN — HYDROMORPHONE HYDROCHLORIDE 0.4 MG: 0.2 INJECTION, SOLUTION INTRAMUSCULAR; INTRAVENOUS; SUBCUTANEOUS at 11:11

## 2025-04-02 RX ADMIN — DEXMEDETOMIDINE HYDROCHLORIDE 12 MCG: 100 INJECTION, SOLUTION INTRAVENOUS at 07:52

## 2025-04-02 RX ADMIN — OXYCODONE HYDROCHLORIDE 5 MG: 5 TABLET ORAL at 11:25

## 2025-04-02 RX ADMIN — LIDOCAINE HYDROCHLORIDE 50 MG: 20 INJECTION, SOLUTION INFILTRATION; PERINEURAL at 07:37

## 2025-04-02 RX ADMIN — DEXMEDETOMIDINE HYDROCHLORIDE 12 MCG: 100 INJECTION, SOLUTION INTRAVENOUS at 09:58

## 2025-04-02 RX ADMIN — DEXMEDETOMIDINE HYDROCHLORIDE 12 MCG: 100 INJECTION, SOLUTION INTRAVENOUS at 09:12

## 2025-04-02 RX ADMIN — DEXMEDETOMIDINE HYDROCHLORIDE 12 MCG: 100 INJECTION, SOLUTION INTRAVENOUS at 08:41

## 2025-04-02 RX ADMIN — HYDROMORPHONE HYDROCHLORIDE 0.5 MG: 1 INJECTION, SOLUTION INTRAMUSCULAR; INTRAVENOUS; SUBCUTANEOUS at 09:51

## 2025-04-02 RX ADMIN — KETOROLAC TROMETHAMINE 15 MG: 15 INJECTION, SOLUTION INTRAMUSCULAR; INTRAVENOUS at 10:49

## 2025-04-02 RX ADMIN — DEXMEDETOMIDINE HYDROCHLORIDE 12 MCG: 100 INJECTION, SOLUTION INTRAVENOUS at 09:52

## 2025-04-02 RX ADMIN — FENTANYL CITRATE 100 MCG: 50 INJECTION INTRAMUSCULAR; INTRAVENOUS at 07:37

## 2025-04-02 RX ADMIN — FENTANYL CITRATE 50 MCG: 50 INJECTION, SOLUTION INTRAMUSCULAR; INTRAVENOUS at 10:45

## 2025-04-02 RX ADMIN — ACETAMINOPHEN 975 MG: 325 TABLET, FILM COATED ORAL at 11:25

## 2025-04-02 RX ADMIN — MIDAZOLAM 2 MG: 1 INJECTION INTRAMUSCULAR; INTRAVENOUS at 07:00

## 2025-04-02 RX ADMIN — TRANEXAMIC ACID 1 G: 10 INJECTION, SOLUTION INTRAVENOUS at 07:42

## 2025-04-02 ASSESSMENT — ACTIVITIES OF DAILY LIVING (ADL)
ADLS_ACUITY_SCORE: 32
ADLS_ACUITY_SCORE: 32
ADLS_ACUITY_SCORE: 34
ADLS_ACUITY_SCORE: 34
ADLS_ACUITY_SCORE: 32
ADLS_ACUITY_SCORE: 34
ADLS_ACUITY_SCORE: 32
ADLS_ACUITY_SCORE: 32

## 2025-04-02 ASSESSMENT — ENCOUNTER SYMPTOMS: DYSRHYTHMIAS: 0

## 2025-04-02 NOTE — ANESTHESIA PROCEDURE NOTES
Airway       Patient location during procedure: OR       Procedure Start/Stop Times: 4/2/2025 7:39 AM  Staff -        CRNA: Alejo Syed APRN CRNA       Performed By: CRNAIndications and Patient Condition       Indications for airway management: cisco-procedural and airway protection       Induction type:intravenous       Mask difficulty assessment: 1 - vent by mask    Final Airway Details       Final airway type: endotracheal airway       Successful airway: ETT - single  Endotracheal Airway Details        ETT size (mm): 8.0       Successful intubation technique: direct laryngoscopy       DL Blade Type: MAC 3       Grade View of Cords: 1       Adjucts: stylet       Position: Left       Measured from: gums/teeth       Secured at (cm): 23       Bite block used: None    Post intubation assessment        Placement verified by: capnometry, equal breath sounds and chest rise        Number of attempts at approach: 1       Secured with: tape       Ease of procedure: easy       Dentition: Intact    Medication(s) Administered   Medication Administration Time: 4/2/2025 7:39 AM

## 2025-04-02 NOTE — ANESTHESIA CARE TRANSFER NOTE
Patient: Tello Jones    Procedure: Procedure(s):  OPEN REDUCTION INTERNAL FIXATION, FRACTURE, HUMERUS, PROXIMAL       Diagnosis: Traumatic closed displaced fracture of right shoulder with anterior dislocation with routine healing, subsequent encounter [S42.91XD]  Diagnosis Additional Information: No value filed.    Anesthesia Type:   General     Note:    Oropharynx: oropharynx clear of all foreign objects and spontaneously breathing  Level of Consciousness: drowsy  Oxygen Supplementation: face mask  Level of Supplemental Oxygen (L/min / FiO2): 10  Independent Airway: airway patency satisfactory and stable  Dentition: dentition unchanged  Vital Signs Stable: post-procedure vital signs reviewed and stable  Report to RN Given: handoff report given  Patient transferred to: PACU    Handoff Report: Identifed the Patient, Identified the Reponsible Provider, Reviewed the pertinent medical history, Discussed the surgical course, Reviewed Intra-OP anesthesia mangement and issues during anesthesia, Set expectations for post-procedure period and Allowed opportunity for questions and acknowledgement of understanding      Vitals:  Vitals Value Taken Time   /75 04/02/25 1007   Temp     Pulse 76 04/02/25 1010   Resp 10 04/02/25 1010   SpO2 100 % 04/02/25 1010   Vitals shown include unfiled device data.    Electronically Signed By: RUBÉN Santizo CRNA  April 2, 2025  10:11 AM

## 2025-04-02 NOTE — OP NOTE
ORTHOPEDIC SURGERY  OPERATIVE NOTE    Tello Jones  [id number]  1985    April 2, 2025      PREOPERATIVE DIAGNOSIS:    Right closed, comminuted proximal humerus fracture, 4 part  Alcohol abuse with a history of seizures    POSTOPERATIVE DIAGNOSIS:   Same    PROCEDURE:    Right comminuted proximal humerus open reduction and internal fixation    SURGEON:  Cruz Gleason MD    ASSISTANT: Felicity Hatfield PA-C. A skilled first assistant was necessary for this procedure for assistance with patient positioning, prepping, draping, surgical visualization, wound closure, and application of the dressing.     SPECIMENS:  None     COMPLICATIONS:  None    ESTIMATED BLOOD LOSS: 50cc    IMPLANTS:   Implant Name Type Inv. Item Serial No.  Lot No. LRB No. Used Action   IMP PLATE SYN LCP PROX HUM 3.5MM 03H .901 - [id number] Metallic Hardware/[employer] IMP PLATE SYN LCP PROX HUM 3.5MM 03H .901  [employer]ES[employer]AT[employer] 8003 16 MAR 2025 Right 1 Implanted   IMP WIRE FIGUEROA 2.8B984SC 292.20 - [id number]  IMP WIRE FIGUEROA 2.9H376YL 292.20  [employer] 8003 16 MAR 2025 Right 1 Implanted   IMP WIRE FIGUEROA 1.9D017RB 292.71 - [id number] Wire IMP WIRE FIGUEROA 1.3U522XB 292.71  [employer] 8003 16 MAR 2025 Right 1 Implanted   IMP SCR SYN 3.5X45MM LOCKING W/STARDRIVE .119 - [id number] Metallic Hardware/[employer] IMP SCR SYN 3.5X45MM LOCKING W/STARDRIVE .119  [employer] 8003 [id number] Right 3 Implanted   IMP SCR SYN 3.5X36MM LOCKING W/STARDRIVE .115 - [id number] Metallic Hardware/[employer] IMP SCR SYN 3.5X36MM LOCKING W/STARDRIVE .115  [employer] 8003 [id number] Right 1 Implanted   IMP SCR SYN 3.5X50MM LOCKING W/STARDRIVE .121 - [id number] Metallic Hardware/[employer] IMP SCR SYN 3.5X50MM LOCKING W/STARDRIVE .121  [employer] 8003 [id number] Right 2 Implanted   IMP SCR SYN 3.5X55MM LOCKING W/STARDRIVE .123 - [id number] Metallic Hardware/[employer] IMP SCR SYN  3.5X55MM LOCKING W/STARDRIVE .123  [employer] 8003 [id number] Right 2 Implanted   IMP SCR SYN CORTEX 3.5X28MM SELF TAP .828 - [id number] Metallic Hardware/Oracle IMP SCR SYN CORTEX 3.5X28MM SELF TAP .828  [employer] 8003 [id number] Right 3 Implanted         TOURNIQUET TIME:  0min @ 250 mmHg    INDICATIONS:    Tello Jones is a 39 year old male who presented with a right comminuted, four-part proximal humerus fracture after a fall down a flight of stairs.  He was initially seen in [place] emergency department and discharged for clinical follow-up.  He was seen by my partner Dr. Bruce.  Updated radiographs were obtained which revealed significant displacement of his fracture with it falling into varus.  On exam he was noted to be neurovascular intact.  Axillary nerve function was noted to be intact.  The patient reports he is otherwise healthy but does have a history of alcohol abuse.  He reports he has had 2 seizures.  He works as a     We discussed possible treatment options including nonoperative and operative intervention along with the risks, benefits, and recovery of each option.  I recommended surgery to align his fracture, allow for early function, and to maximize his outcome.  We discussed risks related to surgery     The risks of bleeding, infection, nerve damage, nonunion, malunion, hardware failure, arthritis, avascular necrosis, osteonecrosis, loss of motion, pain, further surgery, stiffness, and the medical risks of anesthesia were discussed. Benefits including improved chance of motion, earlier rehab and improved function were discussed.    We had an extensive discussion regarding his alcohol use.  He saw his primary care physician as well who discussed treatment plans.  We discussed how alcohol can impair bone healing.  In addition we had a long discussion regarding caution regarding withdrawal after surgery as well as combination of pain medication and alcohol.   He confirmed understanding of these risks and elected to proceed with surgery    Alternatives including nonoperative management were discussed, but not recommended.  The patient and his mother were in agreement with the plan to proceed.  The informed consent was signed and documented.  Met with the patient preoperatively to melisa the operative extremity.    OPERATIVE COURSE:    The patient was brought into the operating room and placed on operating table.  The patient underwent general anesthesia.  The patient was positioned in a slight beach chair position.  All bony prominences were well-padded.  The operative extremity was cleansed with Hibiclens. The operative extremity was then prepped with ChloraPrep.  The surgical team scrubbed in.  A WHO timeout was conducted to verify correct patient, correct extremity, presence of the surgeon's initials on the operative extremity, and administration of IV antibiotics, in this case Ancef     The patient's incision was outlined extending along the deltopectoral interval.  Good hemostasis was achieved through the soft tissues.  Careful dissection was utilized to find the cephalic vein.  The deltopectoral interval was identified and retractors were placed.  The fracture was then identified and further debrided.    Fluoroscopy was brought in to further identify the patient's fracture pattern.  He was noted to have fractures extending into the greater tuberosity.  These were initially pinned with K wires.  Traction was utilized through a pneumatic arm hastings to aid in reducing the fracture along the surgical neck and anatomic neck.  K wires were placed to help hold this reduction.    A series of Vicryl sutures were then placed through the rotator cuff to aid in reduction.    A Synthes 3 hole proximal humerus placed was then placed along the lateral humerus.  K wires were used to hold this in place.  Vicryl sutures around the rotator cuff were passed through the plate to aid in  securing the tuberosity fragments.  Grashey and Scap Y views were obtained.  The deltoid insertion was slightly elevated to aid in plate placement posteriorly.  Overall good global alignment of the proximal humerus was achieved.    The plate was brought down to bone utilizing noncortical screw through the shaft.  A series of locking screws were then placed under fluoroscopic guidance to ensure the screws remained extra-articular.    Additional screws were then placed in the shaft to further stabilize the fracture.  Overall good alignment of the fracture was achieved.  There was a medial calcar fragment and a posterior tuberosity fragment that were left as further reduction fixation would have caused more morbidity with minimal benefit.     Fluoroscopy was then utilized to confirm good fixation of the humerus and good placement of all hardware.      0 Vicryl was utilized to approximate the deltopectoral interval.  2-0 Monocryl was utilized in the subcutaneous tissue followed by a zip tie closure and Dermabond.  A sterile dressing was placed and the patient was placed in a sling.     All instruments were accounted for at the end of the case. The patient awoke from anesthesia and was transferred to the PACU in stable condition.        POST OP PLAN:     1.  Ancef x 24 hours.   2.  [id number] for DVT prophylaxis.   3.   No PACU x-rays.   4.  NWB or lifting > 5lbs, sling for comfort, shoulder shrugs and pendulums ok. Elbow and wrist ROM as tolerated  5.  Physical therapy/occupational therapy, may remove sling for PT. No active/passive ER pass neutral and flexion pass 70.   6.  Keep wound dry for 3 days. May get wound wet after 3 days.   7.  PT scheduled outpatient  8.  Follow up in 2 weeks in clinic      Please contact Dr. Gleason's Care Team at [phone] to schedule an appointment.       Dr. Gleason sees patients at 2 clinic locations:  [facility] [place]  [address] [place] [address]  [facility] - [address], [address], [place] [address]        Please call the on-call phone number [phone] during evenings, nights and weekends for any urgent needs. Prescription refills must be done during business hours by calling [phone]     Cruz Gleason MD  [facility]

## 2025-04-02 NOTE — ANESTHESIA PROCEDURE NOTES
Brachial plexus Procedure Note    Pre-Procedure   Staff -        Anesthesiologist:  Dali Whitten MD       Performed By: anesthesiologist       Referred By: BUCK Gleason       Location: pre-op       Procedure Start/Stop Times: 4/2/2025 7:00 AM and 4/2/2025 7:11 AM       Pre-Anesthestic Checklist: patient identified, IV checked, site marked, risks and benefits discussed, informed consent, monitors and equipment checked, pre-op evaluation, at physician/surgeon's request and post-op pain management  Timeout:       Correct Patient: Yes        Correct Procedure: Yes        Correct Site: Yes        Correct Position: Yes        Correct Laterality: Yes        Site Marked: Yes  Procedure Documentation  Procedure: Brachial plexus         Diagnosis: HUMERUS FX       Laterality: right       Patient Position: sitting       Patient Prep/Sterile Barriers: sterile gloves, mask       Skin prep: Chloraprep       Local skin infiltrated with 5 mL of 2% lidocaine.  (interscalene approach).       Needle Gauge: 20.        Needle Length (Inches): 4        Ultrasound guided       1. Ultrasound was used to identify targeted nerve, plexus, vascular marker, or fascial plane and place a needle adjacent to it in real-time.       2. Ultrasound was used to visualize the spread of anesthetic in close proximity to the above referenced structure.       4. The visualized anatomic structures appeared normal.       5. There were no apparent abnormal pathologic findings.    Assessment/Narrative         The placement was negative for: blood aspirated, painful injection and site bleeding       Paresthesias: No.       Bolus given via needle. no blood aspirated via catheter.        Secured via tunneling.        Insertion/Infusion Method: Single Shot       Complications: none    Medication(s) Administered   Bupivacaine 0.5% w/ 1:200K Epi (Other) - Other   20 mL - 4/2/2025 7:00:00 AM  fentaNYL (PF) (SUBLIMAZE) injection - Intravenous   100 mcg -  "4/2/2025 7:00:00 AM  midazolam (VERSED) injection - Intravenous   2 mg - 4/2/2025 7:00:00 AM  Medication Administration Time: 4/2/2025 7:00 AM      FOR [facility] [place] ONLY:   Pain Team Contact information: please page the Pain Team Via [employer]. Search \"Pain\". During daytime hours, please page the attending first. At night please page the resident first.      "

## 2025-04-02 NOTE — ANESTHESIA PREPROCEDURE EVALUATION
Anesthesia Pre-Procedure Evaluation    Patient: Tello Jones   MRN: [id number] : 1985        Procedure : Procedure(s):  OPEN REDUCTION INTERNAL FIXATION, FRACTURE, HUMERUS, PROXIMAL          Past Medical History:   Diagnosis Date    Alcohol abuse     Alcohol withdrawal seizure (H)     Alcohol-induced acute pancreatitis     Gastroesophageal reflux disease     Seizures (H)       History reviewed. No pertinent surgical history.   Allergies   Allergen Reactions    Cefaclor Rash      Social History     Tobacco Use    Smoking status: Not on file    Smokeless tobacco: Not on file   Substance Use Topics    Alcohol use: Not on file      Wt Readings from Last 1 Encounters:   25 86.4 kg (190 lb 9 oz)        Anesthesia Evaluation   Pt has had prior anesthetic. Type: General.    No history of anesthetic complications       ROS/MED HX  ENT/Pulmonary:  - neg pulmonary ROS  (-) asthma and recent URI   Neurologic:       Cardiovascular:  - neg cardiovascular ROS  (-) arrhythmias and valvular problems/murmurs   METS/Exercise Tolerance:     Hematologic:       Musculoskeletal:       GI/Hepatic:     (+) GERD, Asymptomatic on medication,                  Renal/Genitourinary:  - neg Renal ROS     Endo:  - neg endo ROS     Psychiatric/Substance Use:     (+) psychiatric history anxiety alcohol abuse      Infectious Disease:       Malignancy:       Other:            Physical Exam    Airway        Mallampati: II   TM distance: > 3 FB   Neck ROM: full   Mouth opening: > 3 cm    Respiratory Devices and Support         Dental       (+) Minor Abnormalities - some fillings, tiny chips      Cardiovascular   cardiovascular exam normal          Pulmonary   pulmonary exam normal                OUTSIDE LABS:  CBC:   Lab Results   Component Value Date    WBC 5.3 2025    WBC 7.6 2025    HGB 11.3 (L) 2025    HGB 15.6 2025    HCT 32.9 (L) 2025    HCT 44.7 2025    PLT 95 (L) 2025      "03/19/2025     BMP:   Lab Results   Component Value Date     03/20/2025     03/19/2025    POTASSIUM 3.7 03/22/2025    POTASSIUM 3.6 03/21/2025    CHLORIDE 101 03/20/2025    CHLORIDE 92 (L) 03/19/2025    CO2 24 03/20/2025    CO2 19 (L) 03/19/2025    BUN 5.8 (L) 03/20/2025    BUN 6.0 03/19/2025    CR 0.68 03/20/2025    CR 0.88 03/19/2025    GLC 85 03/20/2025     (H) 03/19/2025     COAGS:   Lab Results   Component Value Date    INR 1.07 12/04/2024     POC: No results found for: \"BGM\", \"HCG\", \"HCGS\"  HEPATIC:   Lab Results   Component Value Date    ALBUMIN 3.5 03/20/2025    PROTTOTAL 6.2 (L) 03/20/2025    ALT 64 03/20/2025    AST 68 (H) 03/20/2025    ALKPHOS 60 03/20/2025    BILITOTAL 1.2 03/20/2025     OTHER:   Lab Results   Component Value Date    OVIDIO 8.1 (L) 03/20/2025    PHOS 3.2 03/22/2025    MAG 2.1 03/19/2025    LIPASE 10 (L) 03/19/2025       Anesthesia Plan    ASA Status:  3    NPO Status:  NPO Appropriate    Anesthesia Type: General.     - Airway: ETT   Induction: Intravenous.   Maintenance: Balanced.        Consents    Anesthesia Plan(s) and associated risks, benefits, and realistic alternatives discussed. Questions answered and patient/representative(s) expressed understanding.     - Discussed:     - Discussed with:  Patient      - Extended Intubation/Ventilatory Support Discussed: No.      - Patient is DNR/DNI Status: No     Use of blood products discussed: No .     Postoperative Care    Pain management: IV analgesics, Oral pain medications, Peripheral nerve block (Single Shot), Multi-modal analgesia.   PONV prophylaxis: Ondansetron (or other 5HT-3), Dexamethasone or Solumedrol     Comments:    Other Comments: The surgeon has given a verbal order transferring care of this patient to me for the performance of a regional analgesia block for either post-op pain control or primary anesthetic. It is requested of me because I am uniquely trained and qualified to perform this block and the " "surgeon is neither trained nor qualified to perform this procedure.    Ultrasound guided peripheral nerve block(s) discussed. The patient was informed that undergoing the block is not required for surgery to proceed and is optional, but they can be beneficial in reducing post operative pain and pain medication requirements for a period of time (several hours to a few days). Block rationale, procedure, expected results, and block specific side effects reviewed with the patient. Risks, including but not limited to bleeding, infection, nerve damage, damage to surrounding structures, medication adverse/allergic reactions, and block failure discussed. Other anesthetic/pain control options discussed.  Questions encouraged and answered.  The patient wishes to proceed.                Dali Whitten MD    Clinically Significant Risk Factors Present on Admission                             # Overweight: Estimated body mass index is 25.84 kg/m  as calculated from the following:    Height as of this encounter: 1.829 m (6' 0.01\").    Weight as of this encounter: 86.4 kg (190 lb 9 oz).                "

## 2025-04-02 NOTE — DISCHARGE INSTRUCTIONS
You received Toradol, an IV form of Ibuprofen (Motrin) at 10:50 am.  Do not take any Ibuprofen products until 4:50 pm.      Maximum acetaminophen (Tylenol) dose from all sources should not exceed 4 grams (4000 mg) per day.  You received 975 mg at 11:30 am.  Next dose after 5:30 pm      5 mg of Oxycodone given at 11:30 am

## 2025-04-02 NOTE — ANESTHESIA POSTPROCEDURE EVALUATION
Patient: Tello Jones    Procedure: Procedure(s):  OPEN REDUCTION INTERNAL FIXATION, FRACTURE, HUMERUS, PROXIMAL       Anesthesia Type:  General    Note:  Disposition: Outpatient   Postop Pain Control: Uneventful            Sign Out: Well controlled pain   PONV: No   Neuro/Psych: Uneventful            Sign Out: Acceptable/Baseline neuro status   Airway/Respiratory: Uneventful            Sign Out: Acceptable/Baseline resp. status   CV/Hemodynamics: Uneventful            Sign Out: Acceptable CV status; No obvious hypovolemia; No obvious fluid overload   Other NRE: NONE   DID A NON-ROUTINE EVENT OCCUR? No           Last vitals:  Vitals Value Taken Time   /82 04/02/25 1225   Temp 98.06  F (36.7  C) 04/02/25 1230   Pulse 83 04/02/25 1225   Resp 12 04/02/25 1215   SpO2 95 % 04/02/25 1231   Vitals shown include unfiled device data.    Electronically Signed By: Nick Zayas MD  April 2, 2025  1:10 PM

## 2025-04-07 ENCOUNTER — OFFICE VISIT (OUTPATIENT)
Dept: NEUROLOGY | Facility: CLINIC | Age: 40
End: 2025-04-07
Attending: INTERNAL MEDICINE
Payer: COMMERCIAL

## 2025-04-07 VITALS — HEART RATE: 105 BPM | OXYGEN SATURATION: 100 % | TEMPERATURE: 97.8 F

## 2025-04-07 DIAGNOSIS — F10.939 ALCOHOL WITHDRAWAL SEIZURE WITH COMPLICATION (H): ICD-10-CM

## 2025-04-07 DIAGNOSIS — R56.9 ALCOHOL WITHDRAWAL SEIZURE WITH COMPLICATION (H): ICD-10-CM

## 2025-04-07 DIAGNOSIS — R56.9 SEIZURES (H): Primary | ICD-10-CM

## 2025-04-07 NOTE — LETTER
2025    Tello Jones   1985        To Whom it May Concern;    Please excuse Tello Jones from work/school for a healthcare visit on 2025.    Sincerely,        Shona Sigala MD

## 2025-04-07 NOTE — PROGRESS NOTES
"[facility] Epilepsy Care History and Physical       Patient:  Tello Jones  :  1985   Age:  39 year old   Today's Office Visit:  2025    Referring Provider:    Davon Gordon MD  [address] [place] [address]      History of Present Illness:    Tello Jones is a 39 year old right-handed male who was referred to the neurology clinic by ER after his seizure 3/2025.  Patient had a seizure on 3/19/2025, which was witnessed by his girlfriend.    His GF says she heard a thud and went to check on him.  She found him between the bed and the dresser.  He was unresponsive and rigid and was shaking. He was foaming at the mouth and nose.  It lasted at least 2 minutes, and it took about 5 min to respond.  He felt confused when he came to.  He denies an aura.  He dislocated his shoulder and then was fractured after reduction.  He says he used to drink \"a lot\" every day.  He didn't drink for less than 24 hrs and was going through withdrawal with sweatiness and shakiness before the seizure.  He had another seizure last December.  That was also an alcohol withdrawal seizure.  He was at work outside. His coworkers saw him fall. He didn't get much details about his seizure.  He had a mild concussion, ruptured spleen and fractured thoracic and lumbar vertebrae.  He didn't need surgery.    He had an episode of passing out last summer. He was told he was dehydrated. He denies any other seizures.  He denies seizures in childhood. Denies dejavu, olfactory hallucinations or abnormal involuntary movements.     Epilepsy Risk Factors:  He denies family history of epilepsy. He doesn't think he had febrile seizures, meningitis, encephalitis.   Precipitating factors:   Alcohol withdrawal  Past Medical History: depression, anxiety, alcohol related pancreatitis, GERD  Past Medical History:   Diagnosis Date    Alcohol abuse     Alcohol withdrawal seizure (H)     Alcohol-induced acute pancreatitis     " Gastroesophageal reflux disease     Seizures (H)       Past Surgical History:   Procedure Laterality Date    OPEN REDUCTION INTERNAL FIXATION HUMERUS PROXIMAL Right 4/2/2025    Procedure: Right comminuted proximal humerus open reduction and internal fixation;  Surgeon: Cruz Gleason MD;  Location: [facility] OR     No family history on file.   Social History     Socioeconomic History    Marital status: Single     Social Drivers of Health     Financial Resource Strain: Low Risk  (3/19/2025)    Financial Resource Strain     Within the past 12 months, have you or your family members you live with been unable to get utilities (heat, electricity) when it was really needed?: No   Food Insecurity: Low Risk  (3/19/2025)    Food Insecurity     Within the past 12 months, did you worry that your food would run out before you got money to buy more?: No     Within the past 12 months, did the food you bought just not last and you didn t have money to get more?: No   Transportation Needs: Low Risk  (3/19/2025)    Transportation Needs     Within the past 12 months, has lack of transportation kept you from medical appointments, getting your medicines, non-medical meetings or appointments, work, or from getting things that you need?: No   Social Connections: Socially Integrated (8/19/2024)    Received from [facility]  & [facility]    Social Connections     Do you often feel lonely or isolated from those around you?: 0   Interpersonal Safety: Low Risk  (4/2/2025)    Interpersonal Safety     Do you feel physically and emotionally safe where you currently live?: Yes     Within the past 12 months, have you been hit, slapped, kicked or otherwise physically hurt by someone?: No     Within the past 12 months, have you been humiliated or emotionally abused in other ways by your partner or ex-partner?: No   Recent Concern: Interpersonal Safety - High Risk (3/19/2025)    Interpersonal Safety     Do you feel physically and  emotionally safe where you currently live?: No     Within the past 12 months, have you been hit, slapped, kicked or otherwise physically hurt by someone?: No     Within the past 12 months, have you been humiliated or emotionally abused in other ways by your partner or ex-partner?: No   Housing Stability: Low Risk  (3/19/2025)    Housing Stability     Do you have housing? : Yes     Are you worried about losing your housing?: No      Employment/School:  he has a h/o alcohol abuse, is looking for rehab, he denies illicit drug use. 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   Driving:  Currently patient is:  Not driving.  Previous Evaluations for Epilepsy:  EE2024: normal awake   MRI of Brain w and wo 2025 at [facility] of neurology   Current Outpatient Medications   Medication Sig Dispense Refill    acamprosate (CAMPRAL) 333 MG EC tablet Take 666 mg by mouth 3 times daily.      acetaminophen (TYLENOL) 325 MG tablet Take 2 tablets (650 mg) by mouth every 4 hours as needed for mild pain. 50 tablet 0    amitriptyline (ELAVIL) 50 MG tablet Take 50 mg by mouth at bedtime.      aspirin 81 MG EC tablet Take 1 tablet (81 mg) by mouth daily. 60 tablet 0    ibuprofen (ADVIL/MOTRIN) 600 MG tablet Take 1 tablet (600 mg) by mouth every 6 hours as needed for other (mild and/or inflammatory pain). 30 tablet 0    methocarbamol (ROBAXIN) 750 MG tablet Take 1 tablet (750 mg) by mouth 4 times daily as needed for muscle spasms. 25 tablet 0    multivitamin (CENTRUM SILVER) tablet Take 1 tablet by mouth daily      oxyCODONE (ROXICODONE) 5 MG tablet Take 1-2 tablets (5-10 mg) by mouth every 4 hours as needed for moderate to severe pain. 20 tablet 0    pantoprazole (PROTONIX) 40 MG EC tablet Take 40 mg by mouth daily.      senna-docusate (SENOKOT-S/PERICOLACE) 8.6-50 MG tablet Take 1-2 tablets by mouth 2 times daily. 30 tablet 0    sertraline (ZOLOFT) 100 MG tablet Take 150 mg by mouth daily.       traZODone (DESYREL) 100 MG tablet Take 200 mg by mouth at bedtime.       Past AEDs:      3/19/2025    11:59 PM   AED - ANTIEPILEPTIC DRUGS   Gabapentin Admitted     Exam:    Pulse 105   Temp 97.8  F (36.6  C) (Temporal)   SpO2 100%      Wt Readings from Last 5 Encounters:   04/02/25 190 lb 9 oz (86.4 kg)   03/19/25 190 lb (86.2 kg)   12/04/24 186 lb 4.8 oz (84.5 kg)   09/11/24 185 lb (83.9 kg)   07/15/24 184 lb 15.5 oz (83.9 kg)     GENERAL APPEARANCE:  Alert, awake, cooperative, in no apparent distress.   NEUROLOGICAL EXAMINATION:   MENTAL STATUS:  Alert and oriented.    LANGUAGE/SPEECH:  No aphasia or dysarthria.   CRANIAL NERVES:  Pupils are round and reactive to light.  Extraocular movements are intact.  Face is symmetric.  Hearing is intact to voice.   MOTOR:  Normal tone, bulk and strength 5/5 with no pronator drift.   SENSATION:  Intact to light touch  COORDINATION:  Normal finger to nose.  No dysmetria or tremor.   REFLEXES:  DTRs 2+ symmetric.  GAIT:  Gait and tandem gait are steady.     Assessment and Plan:     #1 convulsions, likely withdrawal seizures: Patient has 2 known GTC seizures which happened in the setting of alcohol withdrawal.  Patient had a brain MRI at [facility], [place], 2/6/2025, which was reported normal.  He also had a normal awake EEG 12/6/2024.  He does not have a known risk factor for epilepsy.  I suggested to do a 3-hour video EEG to look for potential interictal abnormalities.  If his EEG is normal he does not need to start an antiseizure medication.  #2 alcohol dependence/abuse: Patient has a long history of alcohol abuse.  After his first seizure he was recommended to participate in a rehab program, however he did not pursue.  Currently he is dealing with his shoulder dislocation and fracture; however he is intending to participate in a rehab program and has already done his research.  He has cut back on his drinking.    [place] regulations on seizures and  driving were reviewed with the patient.  The patient clearly understands that she/he is prohibited from operating a motor vehicle within 3 months following any seizure (that will impair control of car) or other episode with sudden unconsciousness or inability to sit up, and that she/he is required to report this most recent seizure to the DMV within 30 days after the event.    Avoid any activities that might lead to self-injury or injury of others, within 3 months following any seizure with impaired awareness or impaired motor control such activities include but are not limited to operating power tools, operating firearms, climbing ladders/trees/exposure to heights from which he might fall, exposure to vessels with hot cooking oil or water, and swimming alone.    -3-hour video EEG    -Follow-up after EEG      As described above, I met with the patient for 40 minutes and during this time counseling was greater than 50% of the visit time.  I spent an additional 30 minutes on chart review and documentation.  This note was created in part by the use of Dragon voice recognition system. Inadvertent grammatical errors and typographical errors may still exist.  Shona Sigala MD

## 2025-04-07 NOTE — LETTER
2025       RE: Tello Jones  : 1985   MRN: [id number]      To whom it may concern,    I saw Mr. Tello Jones at [facility] Epilepsy Care for his seizures. He had a seizure 3/19/2025. He needs to avoid driving, working with heavy equipment, operating power tools, operating firearms, climbing ladders/trees/exposure to heights from which he might fall, exposure to vessels with hot cooking oil or water for 3 months after his seizure on 3/19/2025.      Please don't hesitate to contact me if you have any questions or concerns regarding Mr. Jones.    Thank you,    Shona Sigala MD

## 2025-04-07 NOTE — LETTER
"2025       RE: Tello Jones  : 1985   MRN: [id number]      Dear Colleague,    Thank you for referring your patient, Tello Jones, to the [facility] EPILEPSY CARE at [facility]. Please see a copy of my visit note below.    [facility] Epilepsy Care History and Physical       Patient:  Tello Jones  :  1985   Age:  39 year old   Today's Office Visit:  2025    Referring Provider:    Davon Gordon MD  [address] [place] [address]      History of Present Illness:    Tello Jones is a 39 year old right-handed male who was referred to the neurology clinic by ER after his seizure 3/2025.  Patient had a seizure on 3/19/2025, which was witnessed by his girlfriend.    His GF says she heard a thud and went to check on him.  She found him between the bed and the dresser.  He was unresponsive and rigid and was shaking. He was foaming at the mouth and nose.  It lasted at least 2 minutes, and it took about 5 min to respond.  He felt confused when he came to.  He denies an aura.  He dislocated his shoulder and then was fractured after reduction.  He says he used to drink \"a lot\" every day.  He didn't drink for less than 24 hrs and was going through withdrawal with sweatiness and shakiness before the seizure.  He had another seizure last December.  That was also an alcohol withdrawal seizure.  He was at work outside. His coworkers saw him fall. He didn't get much details about his seizure.  He had a mild concussion, ruptured spleen and fractured thoracic and lumbar vertebrae.  He didn't need surgery.    He had an episode of passing out last summer. He was told he was dehydrated. He denies any other seizures.  He denies seizures in childhood. Denies dejavu, olfactory hallucinations or abnormal involuntary movements.     Epilepsy Risk Factors:  He denies family history of epilepsy. He doesn't think he had febrile seizures, " meningitis, encephalitis.   Precipitating factors:   Alcohol withdrawal  Past Medical History: depression, anxiety, alcohol related pancreatitis, GERD  Past Medical History:   Diagnosis Date     Alcohol abuse      Alcohol withdrawal seizure (H)      Alcohol-induced acute pancreatitis      Gastroesophageal reflux disease      Seizures (H)       Past Surgical History:   Procedure Laterality Date     OPEN REDUCTION INTERNAL FIXATION HUMERUS PROXIMAL Right 4/2/2025    Procedure: Right comminuted proximal humerus open reduction and internal fixation;  Surgeon: Cruz Gleason MD;  Location: [facility] OR     No family history on file.   Social History     Socioeconomic History     Marital status: Single     Social Drivers of Health     Financial Resource Strain: Low Risk  (3/19/2025)    Financial Resource Strain      Within the past 12 months, have you or your family members you live with been unable to get utilities (heat, electricity) when it was really needed?: No   Food Insecurity: Low Risk  (3/19/2025)    Food Insecurity      Within the past 12 months, did you worry that your food would run out before you got money to buy more?: No      Within the past 12 months, did the food you bought just not last and you didn t have money to get more?: No   Transportation Needs: Low Risk  (3/19/2025)    Transportation Needs      Within the past 12 months, has lack of transportation kept you from medical appointments, getting your medicines, non-medical meetings or appointments, work, or from getting things that you need?: No   Social Connections: Socially Integrated (8/19/2024)    [facility]    Social Connections      Do you often feel lonely or isolated from those around you?: 0   Interpersonal Safety: Low Risk  (4/2/2025)    Interpersonal Safety      Do you feel physically and emotionally safe where you currently live?: Yes      Within the past 12 months, have you been hit, slapped,  kicked or otherwise physically hurt by someone?: No      Within the past 12 months, have you been humiliated or emotionally abused in other ways by your partner or ex-partner?: No   Recent Concern: Interpersonal Safety - High Risk (3/19/2025)    Interpersonal Safety      Do you feel physically and emotionally safe where you currently live?: No      Within the past 12 months, have you been hit, slapped, kicked or otherwise physically hurt by someone?: No      Within the past 12 months, have you been humiliated or emotionally abused in other ways by your partner or ex-partner?: No   Housing Stability: Low Risk  (3/19/2025)    Housing Stability      Do you have housing? : Yes      Are you worried about losing your housing?: No      Employment/School:  he has a h/o alcohol abuse, is looking for rehab, he denies illicit drug use. 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   Driving:  Currently patient is:  Not driving.  Previous Evaluations for Epilepsy:  EE2024: normal awake   MRI of Brain w and wo 2025 at [facility]   Current Outpatient Medications   Medication Sig Dispense Refill     acamprosate (CAMPRAL) 333 MG EC tablet Take 666 mg by mouth 3 times daily.       acetaminophen (TYLENOL) 325 MG tablet Take 2 tablets (650 mg) by mouth every 4 hours as needed for mild pain. 50 tablet 0     amitriptyline (ELAVIL) 50 MG tablet Take 50 mg by mouth at bedtime.       aspirin 81 MG EC tablet Take 1 tablet (81 mg) by mouth daily. 60 tablet 0     ibuprofen (ADVIL/MOTRIN) 600 MG tablet Take 1 tablet (600 mg) by mouth every 6 hours as needed for other (mild and/or inflammatory pain). 30 tablet 0     methocarbamol (ROBAXIN) 750 MG tablet Take 1 tablet (750 mg) by mouth 4 times daily as needed for muscle spasms. 25 tablet 0     multivitamin (CENTRUM SILVER) tablet Take 1 tablet by mouth daily       oxyCODONE (ROXICODONE) 5 MG tablet Take 1-2 tablets (5-10 mg) by mouth every  4 hours as needed for moderate to severe pain. 20 tablet 0     pantoprazole (PROTONIX) 40 MG EC tablet Take 40 mg by mouth daily.       senna-docusate (SENOKOT-S/PERICOLACE) 8.6-50 MG tablet Take 1-2 tablets by mouth 2 times daily. 30 tablet 0     sertraline (ZOLOFT) 100 MG tablet Take 150 mg by mouth daily.       traZODone (DESYREL) 100 MG tablet Take 200 mg by mouth at bedtime.       Past AEDs:      3/19/2025    11:59 PM   AED - ANTIEPILEPTIC DRUGS   Gabapentin Admitted     Exam:    Pulse 105   Temp 97.8  F (36.6  C) (Temporal)   SpO2 100%      Wt Readings from Last 5 Encounters:   04/02/25 190 lb 9 oz (86.4 kg)   03/19/25 190 lb (86.2 kg)   12/04/24 186 lb 4.8 oz (84.5 kg)   09/11/24 185 lb (83.9 kg)   07/15/24 184 lb 15.5 oz (83.9 kg)     GENERAL APPEARANCE:  Alert, awake, cooperative, in no apparent distress.   NEUROLOGICAL EXAMINATION:   MENTAL STATUS:  Alert and oriented.    LANGUAGE/SPEECH:  No aphasia or dysarthria.   CRANIAL NERVES:  Pupils are round and reactive to light.  Extraocular movements are intact.  Face is symmetric.  Hearing is intact to voice.   MOTOR:  Normal tone, bulk and strength 5/5 with no pronator drift.   SENSATION:  Intact to light touch  COORDINATION:  Normal finger to nose.  No dysmetria or tremor.   REFLEXES:  DTRs 2+ symmetric.  GAIT:  Gait and tandem gait are steady.     Assessment and Plan:     #1 convulsions, likely withdrawal seizures: Patient has 2 known GTC seizures which happened in the setting of alcohol withdrawal.  Patient had a brain MRI at [facility], 2/6/2025, which was reported normal.  He also had a normal awake EEG 12/6/2024.  He does not have a known risk factor for epilepsy.  I suggested to do a 3-hour video EEG to look for potential interictal abnormalities.  If his EEG is normal he does not need to start an antiseizure medication.  #2 alcohol dependence/abuse: Patient has a long history of alcohol abuse.  After his first seizure he  was recommended to participate in a rehab program, however he did not pursue.  Currently he is dealing with his shoulder dislocation and fracture; however he is intending to participate in a rehab program and has already done his research.  He has cut back on his drinking.    [place] regulations on seizures and driving were reviewed with the patient.  The patient clearly understands that she/he is prohibited from operating a motor vehicle within 3 months following any seizure (that will impair control of car) or other episode with sudden unconsciousness or inability to sit up, and that she/he is required to report this most recent seizure to the DMV within 30 days after the event.    Avoid any activities that might lead to self-injury or injury of others, within 3 months following any seizure with impaired awareness or impaired motor control such activities include but are not limited to operating power tools, operating firearms, climbing ladders/trees/exposure to heights from which he might fall, exposure to vessels with hot cooking oil or water, and swimming alone.    -3-hour video EEG    -Follow-up after EEG      As described above, I met with the patient for 40 minutes and during this time counseling was greater than 50% of the visit time.  I spent an additional 30 minutes on chart review and documentation.  This note was created in part by the use of Dragon voice recognition system. Inadvertent grammatical errors and typographical errors may still exist.  Shona Sigala MD            Again, thank you for allowing me to participate in the care of your patient.      Sincerely,    Shona Sigala MD

## 2025-06-03 ENCOUNTER — TELEPHONE (OUTPATIENT)
Dept: NEUROLOGY | Facility: CLINIC | Age: 40
End: 2025-06-03

## 2025-06-03 NOTE — TELEPHONE ENCOUNTER
Pt called in requesting that a letter be written by Dr. Sigala describing that the recent EEG was clear and it is up to pt's PCP to return to work. If possible pt requests that she not mention seizures in the letter. Send letter via [employer] and in the mail to:    [id number] [facility] [id number]     Pt's call back number if needed: [phone]

## 2025-06-05 NOTE — TELEPHONE ENCOUNTER
Pt had recent seizures resulting injury related to alcohol withdrawal. Dr Small met with patient beginning of April and recommended follow-up after EEG. Pt needs follow-up appointment with Dr. Small and can discuss work accommodations and safety at visit.     [employer] message sent to patient.

## 2025-06-10 ENCOUNTER — OFFICE VISIT (OUTPATIENT)
Dept: NEUROLOGY | Facility: CLINIC | Age: 40
End: 2025-06-10
Payer: COMMERCIAL

## 2025-06-10 VITALS
HEART RATE: 99 BPM | TEMPERATURE: 98.4 F | DIASTOLIC BLOOD PRESSURE: 86 MMHG | HEIGHT: 72 IN | BODY MASS INDEX: 25.73 KG/M2 | WEIGHT: 190 LBS | SYSTOLIC BLOOD PRESSURE: 127 MMHG

## 2025-06-10 DIAGNOSIS — F10.930 ALCOHOL WITHDRAWAL SEIZURE WITHOUT COMPLICATION (H): Primary | ICD-10-CM

## 2025-06-10 DIAGNOSIS — R56.9 ALCOHOL WITHDRAWAL SEIZURE WITHOUT COMPLICATION (H): Primary | ICD-10-CM

## 2025-06-10 NOTE — LETTER
6/10/2025    Tello Jones   1985        To Whom it May Concern;    I saw . Tello Jones at [facility] today. He is released to go back to work with no restrictions on or after 2025.  Please don't hesitate to contact me if you have any questions or concerns regarding this person.           Sincerely,    Shona Sigala MD

## 2025-06-10 NOTE — LETTER
"6/10/2025       RE: Tello Jones  : 1985   MRN: [id number]      Dear Colleague,    Thank you for referring your patient, Tello Jones, to the [facility] EPILEPSY CARE at [facility]. Please see a copy of my visit note below.    [facility] Epilepsy Care Progress Note      Patient:  Tello Jones  :  1985   Age:  39 year old   Today's Office Visit:  6/10/2025    Epilepsy Data:  Right-handed male with a history of alcohol abuse who presented for evaluation of a seizure on 3/19/2025.    His GF who witnessed the seizure reported she heard a thud and went to check on him.  She found him between the bed and the dresser.  He was unresponsive and rigid and was shaking. He was foaming at the mouth and nose.  It lasted at least 2 minutes, and it took about 5 min to respond.  He felt confused when he came to.  He denies an aura.  He dislocated his shoulder and then was fractured after reduction.  He says he used to drink \"a lot\" every day.  He didn't drink for less than 24 hrs and was going through withdrawal with sweatiness and shakiness before the seizure.  He had another seizure 2024.  That was also an alcohol withdrawal seizure.  He was at work outside. His coworkers saw him fall. He didn't get much details about his seizure.  He had a mild concussion, ruptured spleen and fractured thoracic and lumbar vertebrae.  He didn't need surgery though.      History of Present Illness:     Mr. Tello Jones returns to the clinic after his EEG.  He is accompanied by his mother.  Tello did not have any seizures since previous visit.  He quit drinking alcohol after his seizure on 2025.      His 3-hour video EEG at [facility] which I reviewed, was a normal awake and asleep EEG.     Tello's mother denies seizures during his childhood, febrile seizures or history of meningitis/encephalitis.    Current Outpatient Medications   Medication " Sig Dispense Refill    acamprosate (CAMPRAL) 333 MG EC tablet Take 666 mg by mouth 3 times daily.      acetaminophen (TYLENOL) 325 MG tablet Take 2 tablets (650 mg) by mouth every 4 hours as needed for mild pain. 50 tablet 0    amitriptyline (ELAVIL) 50 MG tablet Take 50 mg by mouth at bedtime.      aspirin 81 MG EC tablet Take 1 tablet (81 mg) by mouth daily. 60 tablet 0    ibuprofen (ADVIL/MOTRIN) 600 MG tablet Take 1 tablet (600 mg) by mouth every 6 hours as needed for other (mild and/or inflammatory pain). 30 tablet 0    methocarbamol (ROBAXIN) 750 MG tablet Take 1 tablet (750 mg) by mouth 4 times daily as needed for muscle spasms. 25 tablet 0    multivitamin (CENTRUM SILVER) tablet Take 1 tablet by mouth daily      oxyCODONE (ROXICODONE) 5 MG tablet Take 1-2 tablets (5-10 mg) by mouth every 4 hours as needed for moderate to severe pain. 20 tablet 0    pantoprazole (PROTONIX) 40 MG EC tablet Take 40 mg by mouth daily.      senna-docusate (SENOKOT-S/PERICOLACE) 8.6-50 MG tablet Take 1-2 tablets by mouth 2 times daily. 30 tablet 0    sertraline (ZOLOFT) 100 MG tablet Take 150 mg by mouth daily.      traZODone (DESYREL) 100 MG tablet Take 200 mg by mouth at bedtime.        Other Issues:    Is patient safe to drive: Can drive June 20, 2025 on    Exam:    /86   Pulse 99   Temp 98.4  F (36.9  C) (Temporal)   Ht 6' (182.9 cm)   Wt 190 lb (86.2 kg)   BMI 25.77 kg/m       Wt Readings from Last 5 Encounters:   06/10/25 190 lb (86.2 kg)   04/02/25 190 lb 9 oz (86.4 kg)   03/19/25 190 lb (86.2 kg)   12/04/24 186 lb 4.8 oz (84.5 kg)   09/11/24 185 lb (83.9 kg)     General Appearance: Alert, awake, cooperative, pleasant, NAD  Gait: steady  Attention Span:  Normal  Language/speech: no aphasia or dysarthria  Extraocular Movements:  Normal  Coordination:  Normal  Facial Strength:  Normal  Motor Exam: normal tone, bulk and strength    Assessment and Plan:     #1 convulsions, likely withdrawal seizures: Patient has 2  known GTC seizures which happened in the setting of alcohol withdrawal.  Patient had a brain MRI at [facility], [facility], 2/6/2025, which was reported normal.  He also had a normal awake EEG 12/6/2024.  His repeat EEG at [facility] was a normal awake and asleep EEG.  He does not have a known risk factor for epilepsy.     #2  History of alcohol dependence/abuse: Patient has a long history of alcohol abuse.  After his first seizure he was recommended to participate in a rehab program, however he did not pursue. He cut back on his drinking after his second seizure and now he reports he quit drinking.       Both seizures appear to be withdrawal seizures based on the history. EEG and MRI brain were normal.  He doesn't have a known RF for epilepsy. At this time I don't recommend starting a seizure medication.  In case he has further seizures that are not provoked, will consider starting a medication.        - Follow up as needed.            As described above, I met with the patient and his mother for 20 minutes and during this time counseling was greater than 50% of the visit time.  Shona Sigala MD              Again, thank you for allowing me to participate in the care of your patient.      Sincerely,    Shona Sigala MD

## 2025-06-10 NOTE — PROGRESS NOTES
"[facility] Epilepsy Care Progress Note      Patient:  Tello Jones  :  1985   Age:  39 year old   Today's Office Visit:  6/10/2025    Epilepsy Data:  Right-handed male with a history of alcohol abuse who presented for evaluation of a seizure on 3/19/2025.    His GF who witnessed the seizure reported she heard a thud and went to check on him.  She found him between the bed and the dresser.  He was unresponsive and rigid and was shaking. He was foaming at the mouth and nose.  It lasted at least 2 minutes, and it took about 5 min to respond.  He felt confused when he came to.  He denies an aura.  He dislocated his shoulder and then was fractured after reduction.  He says he used to drink \"a lot\" every day.  He didn't drink for less than 24 hrs and was going through withdrawal with sweatiness and shakiness before the seizure.  He had another seizure 2024.  That was also an alcohol withdrawal seizure.  He was at work outside. His coworkers saw him fall. He didn't get much details about his seizure.  He had a mild concussion, ruptured spleen and fractured thoracic and lumbar vertebrae.  He didn't need surgery though.      History of Present Illness:     Mr. Tello Jones returns to the clinic after his EEG.  He is accompanied by his mother.  Tello did not have any seizures since previous visit.  He quit drinking alcohol after his seizure on 2025.      His 3-hour video EEG at [facility] which I reviewed, was a normal awake and asleep EEG.     Tello's mother denies seizures during his childhood, febrile seizures or history of meningitis/encephalitis.    Current Outpatient Medications   Medication Sig Dispense Refill    acamprosate (CAMPRAL) 333 MG EC tablet Take 666 mg by mouth 3 times daily.      acetaminophen (TYLENOL) 325 MG tablet Take 2 tablets (650 mg) by mouth every 4 hours as needed for mild pain. 50 tablet 0    amitriptyline (ELAVIL) 50 MG tablet Take 50 mg by mouth at bedtime.  "     aspirin 81 MG EC tablet Take 1 tablet (81 mg) by mouth daily. 60 tablet 0    ibuprofen (ADVIL/MOTRIN) 600 MG tablet Take 1 tablet (600 mg) by mouth every 6 hours as needed for other (mild and/or inflammatory pain). 30 tablet 0    methocarbamol (ROBAXIN) 750 MG tablet Take 1 tablet (750 mg) by mouth 4 times daily as needed for muscle spasms. 25 tablet 0    multivitamin (CENTRUM SILVER) tablet Take 1 tablet by mouth daily      oxyCODONE (ROXICODONE) 5 MG tablet Take 1-2 tablets (5-10 mg) by mouth every 4 hours as needed for moderate to severe pain. 20 tablet 0    pantoprazole (PROTONIX) 40 MG EC tablet Take 40 mg by mouth daily.      senna-docusate (SENOKOT-S/PERICOLACE) 8.6-50 MG tablet Take 1-2 tablets by mouth 2 times daily. 30 tablet 0    sertraline (ZOLOFT) 100 MG tablet Take 150 mg by mouth daily.      traZODone (DESYREL) 100 MG tablet Take 200 mg by mouth at bedtime.        Other Issues:    Is patient safe to drive: Can drive June 20, 2025 on    Exam:    /86   Pulse 99   Temp 98.4  F (36.9  C) (Temporal)   Ht 6' (182.9 cm)   Wt 190 lb (86.2 kg)   BMI 25.77 kg/m       Wt Readings from Last 5 Encounters:   06/10/25 190 lb (86.2 kg)   04/02/25 190 lb 9 oz (86.4 kg)   03/19/25 190 lb (86.2 kg)   12/04/24 186 lb 4.8 oz (84.5 kg)   09/11/24 185 lb (83.9 kg)     General Appearance: Alert, awake, cooperative, pleasant, NAD  Gait: steady  Attention Span:  Normal  Language/speech: no aphasia or dysarthria  Extraocular Movements:  Normal  Coordination:  Normal  Facial Strength:  Normal  Motor Exam: normal tone, bulk and strength    Assessment and Plan:     #1 convulsions, likely withdrawal seizures: Patient has 2 known GTC seizures which happened in the setting of alcohol withdrawal.  Patient had a brain MRI at [facility], [facility], 2/6/2025, which was reported normal.  He also had a normal awake EEG 12/6/2024.  His repeat EEG at [facility] was a normal awake and asleep EEG.  He does not have a  known risk factor for epilepsy.     #2  History of alcohol dependence/abuse: Patient has a long history of alcohol abuse.  After his first seizure he was recommended to participate in a rehab program, however he did not pursue. He cut back on his drinking after his second seizure and now he reports he quit drinking.       Both seizures appear to be withdrawal seizures based on the history. EEG and MRI brain were normal.  He doesn't have a known RF for epilepsy. At this time I don't recommend starting a seizure medication.  In case he has further seizures that are not provoked, will consider starting a medication.        - Follow up as needed.            As described above, I met with the patient and his mother for 20 minutes and during this time counseling was greater than 50% of the visit time.  Shona Sigala MD

## 2025-06-10 NOTE — LETTER
"6/10/2025       RE: Tello Jones  : 1985   MRN: [id number]      Dear Colleague,    Thank you for referring your patient, Tello Jones, to the [facility] EPILEPSY CARE at [facility]. Please see a copy of my visit note below.    [facility] Epilepsy Care Progress Note      Patient:  Tello Jones  :  1985   Age:  39 year old   Today's Office Visit:  6/10/2025    Epilepsy Data:                    History of Present Illness:     Stopped drinking after his last seizure.        Current Outpatient Medications   Medication Sig Dispense Refill    acamprosate (CAMPRAL) 333 MG EC tablet Take 666 mg by mouth 3 times daily.      acetaminophen (TYLENOL) 325 MG tablet Take 2 tablets (650 mg) by mouth every 4 hours as needed for mild pain. 50 tablet 0    amitriptyline (ELAVIL) 50 MG tablet Take 50 mg by mouth at bedtime.      aspirin 81 MG EC tablet Take 1 tablet (81 mg) by mouth daily. 60 tablet 0    ibuprofen (ADVIL/MOTRIN) 600 MG tablet Take 1 tablet (600 mg) by mouth every 6 hours as needed for other (mild and/or inflammatory pain). 30 tablet 0    methocarbamol (ROBAXIN) 750 MG tablet Take 1 tablet (750 mg) by mouth 4 times daily as needed for muscle spasms. 25 tablet 0    multivitamin (CENTRUM SILVER) tablet Take 1 tablet by mouth daily      oxyCODONE (ROXICODONE) 5 MG tablet Take 1-2 tablets (5-10 mg) by mouth every 4 hours as needed for moderate to severe pain. 20 tablet 0    pantoprazole (PROTONIX) 40 MG EC tablet Take 40 mg by mouth daily.      senna-docusate (SENOKOT-S/PERICOLACE) 8.6-50 MG tablet Take 1-2 tablets by mouth 2 times daily. 30 tablet 0    sertraline (ZOLOFT) 100 MG tablet Take 150 mg by mouth daily.      traZODone (DESYREL) 100 MG tablet Take 200 mg by mouth at bedtime.          Medication Notes:  ***      AED Medication Compliance:  {COMPLIANCE:5303}  Using a pill box:  {YES / NO:375360::\"Yes\"}    Review of " "Systems:  Lethargy / Tiredness:  {Yes /No is default:265756}  Nausea / Vomiting:  {Yes /No is default:807993}  Double Vision:  {Yes /No is default:783746}  Sleepiness:  {Yes /No is default:121638}  Depression:  {Yes /No is default:916294}  Slowed Cognitive Function:  {Yes /No is default:291399}  Memory Problems:  {Yes /No is default:377645}  Poor Balance:  {Yes /No is default:373312}  Dizziness:  {Yes /No is default:381985}  Appetite Changes:  {Yes /No is default:223873}  Blurred Vision:  {Yes /No is default:283855}  Decreased Libido:  {Yes /No is default:515290}  Sleep Changes:  {Yes /No is default:876303}  Behavioral Changes:  {Yes /No is default:284371}  Skin: {Skin:100::\"negative\"}  Respiratory: {Resp:400::\"No shortness of breath, dyspnea on exertion, cough, or hemoptysis\"}  Cardiovascular: {CV:500::\"negative\"}  Have you experienced a traumatic fall since your last visit: {YES COMPLICATIONS/NO:234503}  Are these falls related to your seizures: {YES-VERONICA/NO:374218}    Other Issues:  ***  Is patient safe to drive:  {YES / NO:101596::\"Yes\"}    Exam:    /86   Pulse 99   Temp 98.4  F (36.9  C) (Temporal)   Ht 6' (182.9 cm)   Wt 190 lb (86.2 kg)   BMI 25.77 kg/m       Wt Readings from Last 5 Encounters:   06/10/25 190 lb (86.2 kg)   04/02/25 190 lb 9 oz (86.4 kg)   03/19/25 190 lb (86.2 kg)   12/04/24 186 lb 4.8 oz (84.5 kg)   09/11/24 185 lb (83.9 kg)       General Appearance: {NORMAL/AB/NE:052498}  Gait:  {NORMAL/AB/NE:581158}  Attention Span:  {NORMAL/AB/NE:770649}  Language:  {NORMAL/AB/NE:778715}  Extraocular Movements:  {NORMAL/AB/NE:549269}  Coordination:  {NORMAL/AB/NE:036584}  Visual Fields:  {NORMAL/AB/NE:419594}  Facial Sensation:  {NORMAL/AB/NE:204418}  Facial Strength:  {NORMAL/AB/NE:384687}  Tongue Strength:  {NORMAL/AB/NE:464511}  Limb Strength:  {NORMAL/AB/NE:051207}  Limb Tone:  {NORMAL/AB/NE:769647}  Limb Sensation:  {NORMAL/AB/NE:721095}  General Physical Findings:  " {NORMAL/AB/NE:826926}    Assessment and Plan:   ***    As described above, I met with the patient for *** minutes and during this time counseling was {:[id number]} 50% of the visit time.                      Again, thank you for allowing me to participate in the care of your patient.      Sincerely,    Shona Sigala MD

## (undated) DEVICE — BRUSH SURGICAL SCRUB W/4% CHG SOL 25ML 371073

## (undated) DEVICE — SU VICRYL 0 CT-1 27" J340H

## (undated) DEVICE — SPONGE LAP 18X18" X8435

## (undated) DEVICE — GLOVE BIOGEL PI MICRO INDICATOR UNDERGLOVE SZ 7.5 48975

## (undated) DEVICE — DRAPE IOBAN INCISE 23X17" 6650EZ

## (undated) DEVICE — SU ETHILON 3-0 PS-2 18" 1669H

## (undated) DEVICE — GLOVE BIOGEL PI MICRO INDICATOR UNDERGLOVE SZ 6.5 48965

## (undated) DEVICE — GLOVE BIOGEL PI MICRO SZ 6.0 48560

## (undated) DEVICE — CAST PADDING 4" STERILE 9044S

## (undated) DEVICE — LINEN FULL SHEET 5511

## (undated) DEVICE — Device

## (undated) DEVICE — SU DERMABOND ADVANCED .7ML DNX12

## (undated) DEVICE — BLADE KNIFE SURG 10 371110

## (undated) DEVICE — SYR BULB IRRIG 50ML LATEX FREE 0035280

## (undated) DEVICE — GLOVE BIOGEL PI SZ 7.5 40875

## (undated) DEVICE — LINEN ORTHO ACL PACK 5447

## (undated) DEVICE — IMP SCR SYN CORTEX 3.5X32MM SELF TAP SS 204.832: Type: IMPLANTABLE DEVICE | Site: ARM | Status: NON-FUNCTIONAL

## (undated) DEVICE — SUCTION CANISTER MEDIVAC LINER 3000ML W/LID 65651-530

## (undated) DEVICE — SUTURE MONOCRYL+ 2-0 CT-1 36" UNDYED MCP945H

## (undated) DEVICE — ESU PENCIL W/HOLSTER E2350H

## (undated) DEVICE — KIT SHOULDER POSITIONING BEACH CHAIR ARM HOLDER AR-1644

## (undated) DEVICE — SU VICRYL 1 CT-1 27" J341H

## (undated) DEVICE — DRILL BIT QUICK COUPLING 2.5X110MM GOLD 310.25

## (undated) DEVICE — BONE WAX 2.5GM W31G

## (undated) DEVICE — DRILL BIT SYN 2.8MM CALIBRATED 324.214

## (undated) DEVICE — DRILL BIT SYN QUICK COUPLING 2.8X165MM 310.288

## (undated) DEVICE — LINEN HALF SHEET 5512

## (undated) DEVICE — DRAPE ARTHROSCOPY SHOULDER BEACHCHAIR 29369

## (undated) DEVICE — DRAPE CONVERTORS U-DRAPE 60X72" 8476

## (undated) DEVICE — SLING ARM LG 79-99157

## (undated) DEVICE — ESU GROUND PAD ADULT W/CORD E7507

## (undated) DEVICE — DRAPE C-ARM 60X42" 1013

## (undated) DEVICE — SU MONOCRYL 4-0 PS-2 18" UND Y496G

## (undated) DEVICE — PREP CHLORAPREP 26ML TINTED HI-LITE ORANGE 930815

## (undated) DEVICE — BAG CLEAR TRASH 1.3M 39X33" P4040C

## (undated) DEVICE — SUCTION TIP YANKAUER W/O VENT K86

## (undated) DEVICE — CLOSURE DEVICE ZIPLINE 16CM ZIP16 PS1160

## (undated) DEVICE — DRAPE STERI U 1015

## (undated) DEVICE — PACK SHOULDER RIDGES

## (undated) RX ORDER — DEXAMETHASONE SODIUM PHOSPHATE 4 MG/ML
INJECTION, SOLUTION INTRA-ARTICULAR; INTRALESIONAL; INTRAMUSCULAR; INTRAVENOUS; SOFT TISSUE
Status: DISPENSED
Start: 2025-04-02

## (undated) RX ORDER — ACETAMINOPHEN 325 MG/1
TABLET ORAL
Status: DISPENSED
Start: 2025-04-02

## (undated) RX ORDER — HYDROMORPHONE HCL IN WATER/PF 6 MG/30 ML
PATIENT CONTROLLED ANALGESIA SYRINGE INTRAVENOUS
Status: DISPENSED
Start: 2025-04-02

## (undated) RX ORDER — ONDANSETRON 2 MG/ML
INJECTION INTRAMUSCULAR; INTRAVENOUS
Status: DISPENSED
Start: 2025-04-02

## (undated) RX ORDER — CEFAZOLIN SODIUM/WATER 2 G/20 ML
SYRINGE (ML) INTRAVENOUS
Status: DISPENSED
Start: 2025-04-02

## (undated) RX ORDER — FENTANYL CITRATE 50 UG/ML
INJECTION, SOLUTION INTRAMUSCULAR; INTRAVENOUS
Status: DISPENSED
Start: 2025-04-02

## (undated) RX ORDER — OXYCODONE HYDROCHLORIDE 5 MG/1
TABLET ORAL
Status: DISPENSED
Start: 2025-04-02

## (undated) RX ORDER — TRANEXAMIC ACID 10 MG/ML
INJECTION, SOLUTION INTRAVENOUS
Status: DISPENSED
Start: 2025-04-02

## (undated) RX ORDER — PROPOFOL 10 MG/ML
INJECTION, EMULSION INTRAVENOUS
Status: DISPENSED
Start: 2025-04-02

## (undated) RX ORDER — KETOROLAC TROMETHAMINE 15 MG/ML
INJECTION, SOLUTION INTRAMUSCULAR; INTRAVENOUS
Status: DISPENSED
Start: 2025-04-02

## (undated) RX ORDER — BUPIVACAINE HYDROCHLORIDE AND EPINEPHRINE 5; 5 MG/ML; UG/ML
INJECTION, SOLUTION EPIDURAL; INTRACAUDAL; PERINEURAL
Status: DISPENSED
Start: 2025-04-02

## (undated) RX ORDER — LIDOCAINE HYDROCHLORIDE 10 MG/ML
INJECTION, SOLUTION EPIDURAL; INFILTRATION; INTRACAUDAL; PERINEURAL
Status: DISPENSED
Start: 2025-04-02